# Patient Record
Sex: MALE | URBAN - METROPOLITAN AREA
[De-identification: names, ages, dates, MRNs, and addresses within clinical notes are randomized per-mention and may not be internally consistent; named-entity substitution may affect disease eponyms.]

---

## 2021-11-29 ENCOUNTER — INPATIENT (INPATIENT)
Facility: HOSPITAL | Age: 51
LOS: 10 days | Discharge: PSYCHIATRIC OR SUBSTANCE USE FACILITY | DRG: 896 | End: 2021-12-10
Attending: HOSPITALIST | Admitting: HOSPITALIST
Payer: MEDICAID

## 2021-11-29 VITALS
HEART RATE: 90 BPM | RESPIRATION RATE: 17 BRPM | DIASTOLIC BLOOD PRESSURE: 64 MMHG | OXYGEN SATURATION: 99 % | SYSTOLIC BLOOD PRESSURE: 124 MMHG

## 2021-11-29 DIAGNOSIS — F10.20 ALCOHOL DEPENDENCE, UNCOMPLICATED: ICD-10-CM

## 2021-11-29 LAB
ALBUMIN SERPL ELPH-MCNC: 3.6 G/DL — SIGNIFICANT CHANGE UP (ref 3.3–5)
ALP SERPL-CCNC: 76 U/L — SIGNIFICANT CHANGE UP (ref 40–120)
ALT FLD-CCNC: 16 U/L — SIGNIFICANT CHANGE UP (ref 10–45)
ANION GAP SERPL CALC-SCNC: 11 MMOL/L — SIGNIFICANT CHANGE UP (ref 5–17)
APAP SERPL-MCNC: <15 UG/ML — SIGNIFICANT CHANGE UP (ref 10–30)
AST SERPL-CCNC: 18 U/L — SIGNIFICANT CHANGE UP (ref 10–40)
BASOPHILS # BLD AUTO: 0.05 K/UL — SIGNIFICANT CHANGE UP (ref 0–0.2)
BASOPHILS NFR BLD AUTO: 0.5 % — SIGNIFICANT CHANGE UP (ref 0–2)
BILIRUB SERPL-MCNC: 0.2 MG/DL — SIGNIFICANT CHANGE UP (ref 0.2–1.2)
BUN SERPL-MCNC: 11 MG/DL — SIGNIFICANT CHANGE UP (ref 7–23)
CALCIUM SERPL-MCNC: 9.1 MG/DL — SIGNIFICANT CHANGE UP (ref 8.4–10.5)
CHLORIDE SERPL-SCNC: 107 MMOL/L — SIGNIFICANT CHANGE UP (ref 96–108)
CO2 SERPL-SCNC: 25 MMOL/L — SIGNIFICANT CHANGE UP (ref 22–31)
CREAT SERPL-MCNC: 0.65 MG/DL — SIGNIFICANT CHANGE UP (ref 0.5–1.3)
EOSINOPHIL # BLD AUTO: 0.15 K/UL — SIGNIFICANT CHANGE UP (ref 0–0.5)
EOSINOPHIL NFR BLD AUTO: 1.6 % — SIGNIFICANT CHANGE UP (ref 0–6)
ETHANOL SERPL-MCNC: SIGNIFICANT CHANGE UP MG/DL (ref 0–10)
GLUCOSE SERPL-MCNC: 74 MG/DL — SIGNIFICANT CHANGE UP (ref 70–99)
HCT VFR BLD CALC: 31.7 % — LOW (ref 39–50)
HGB BLD-MCNC: 9.1 G/DL — LOW (ref 13–17)
IMM GRANULOCYTES NFR BLD AUTO: 0.5 % — SIGNIFICANT CHANGE UP (ref 0–1.5)
LYMPHOCYTES # BLD AUTO: 1.96 K/UL — SIGNIFICANT CHANGE UP (ref 1–3.3)
LYMPHOCYTES # BLD AUTO: 20.6 % — SIGNIFICANT CHANGE UP (ref 13–44)
MAGNESIUM SERPL-MCNC: 2 MG/DL — SIGNIFICANT CHANGE UP (ref 1.6–2.6)
MCHC RBC-ENTMCNC: 20.6 PG — LOW (ref 27–34)
MCHC RBC-ENTMCNC: 28.7 GM/DL — LOW (ref 32–36)
MCV RBC AUTO: 71.7 FL — LOW (ref 80–100)
MONOCYTES # BLD AUTO: 1.23 K/UL — HIGH (ref 0–0.9)
MONOCYTES NFR BLD AUTO: 12.9 % — SIGNIFICANT CHANGE UP (ref 2–14)
NEUTROPHILS # BLD AUTO: 6.08 K/UL — SIGNIFICANT CHANGE UP (ref 1.8–7.4)
NEUTROPHILS NFR BLD AUTO: 63.9 % — SIGNIFICANT CHANGE UP (ref 43–77)
NRBC # BLD: 0 /100 WBCS — SIGNIFICANT CHANGE UP (ref 0–0)
PCP SPEC-MCNC: SIGNIFICANT CHANGE UP
PHOSPHATE SERPL-MCNC: 3.3 MG/DL — SIGNIFICANT CHANGE UP (ref 2.5–4.5)
PLATELET # BLD AUTO: 490 K/UL — HIGH (ref 150–400)
POTASSIUM SERPL-MCNC: 4.5 MMOL/L — SIGNIFICANT CHANGE UP (ref 3.5–5.3)
POTASSIUM SERPL-SCNC: 4.5 MMOL/L — SIGNIFICANT CHANGE UP (ref 3.5–5.3)
PROT SERPL-MCNC: 6.7 G/DL — SIGNIFICANT CHANGE UP (ref 6–8.3)
RBC # BLD: 4.42 M/UL — SIGNIFICANT CHANGE UP (ref 4.2–5.8)
RBC # FLD: 18.8 % — HIGH (ref 10.3–14.5)
SALICYLATES SERPL-MCNC: <2 MG/DL — LOW (ref 15–30)
SARS-COV-2 RNA SPEC QL NAA+PROBE: DETECTED
SODIUM SERPL-SCNC: 143 MMOL/L — SIGNIFICANT CHANGE UP (ref 135–145)
WBC # BLD: 9.52 K/UL — SIGNIFICANT CHANGE UP (ref 3.8–10.5)
WBC # FLD AUTO: 9.52 K/UL — SIGNIFICANT CHANGE UP (ref 3.8–10.5)

## 2021-11-29 PROCEDURE — 93010 ELECTROCARDIOGRAM REPORT: CPT

## 2021-11-29 PROCEDURE — 99285 EMERGENCY DEPT VISIT HI MDM: CPT

## 2021-11-29 RX ORDER — THIAMINE MONONITRATE (VIT B1) 100 MG
100 TABLET ORAL ONCE
Refills: 0 | Status: COMPLETED | OUTPATIENT
Start: 2021-11-29 | End: 2021-11-29

## 2021-11-29 RX ORDER — SODIUM CHLORIDE 9 MG/ML
1000 INJECTION INTRAMUSCULAR; INTRAVENOUS; SUBCUTANEOUS ONCE
Refills: 0 | Status: COMPLETED | OUTPATIENT
Start: 2021-11-29 | End: 2021-11-29

## 2021-11-29 RX ORDER — FOLIC ACID 0.8 MG
1 TABLET ORAL ONCE
Refills: 0 | Status: COMPLETED | OUTPATIENT
Start: 2021-11-29 | End: 2021-11-29

## 2021-11-29 RX ADMIN — Medication 2 MILLIGRAM(S): at 15:31

## 2021-11-29 RX ADMIN — Medication 100 MILLIGRAM(S): at 17:05

## 2021-11-29 RX ADMIN — Medication 1 MILLIGRAM(S): at 17:05

## 2021-11-29 NOTE — ED BEHAVIORAL HEALTH NOTE - BEHAVIORAL HEALTH NOTE
===================  PRE-HOSPITAL COURSE  ===================  SOURCE:  RN and secondhand EMR documentation.   DETAILS:  Patient was BIBEMS; chief complaint of bizarre behavior.     ============  ED COURSE   ============  SOURCE: RN and secondhand EMR documentation.    ARRIVAL:  Patient arrived disorganized and somewhat cooperative; presents in hospital gown, malodorous and with poor hygiene, abrasions noted on feet and head.   BELONGINGS:  None notable.   BEHAVIOR: Patient has been sleeping in hospital stretcher since arrival; did not want to get off of stretcher to go in telepsych room, ativan was given to calm patient. Patient did endorse SI when he came in, no plan noted, no endorses HI/AH/VH however patient is very disorganized, not oriented to year, unable to answer assessment questions. Patient's speech is garbled and illogical. Patient makes fair eye contact. Patient continues to sleep in hospital stretcher.   TREATMENT:  Patient received 2mg ativan IM ~15:20.   VISITORS:  Patient presently unaccompanied by social supports while in ED.     COVID Exposure Screen- collateral (i.e. third-party, chart review, belongings, etc; include EMS and ED staff)     *Has the patient had a COVID-19 test in the last 90 days?  ( X) Yes   (  ) No   (  ) Unknown- Reason: _____     IF YES PROCEED TO QUESTION #2. IF NO OR UNKNOWN, PLEASE SKIP TO QUESTION #3.     Date of test(s) and result(s): __POSITIVE as of 11/29/2021___     *Has the patient tested positive for COVID-19 antibodies? (  ) Yes   (  ) No   ( X) Unknown- Reason: _____     IF YES PROCEED TO QUESTION #4. IF NO or UNKNOWN, PLEASE SKIP TO QUESTION #5.     Date of positive antibody test: _______     *Has the patient received 2 doses of the COVID-19 vaccine? (  ) Yes   (  ) No   ( X) Unknown- Reason: _____     IF YES PROCEED TO QUESTION #6. IF NO or UNKNOWN, PLEASE SKIP TO QUESTION #7.      Date of second dose: ___ _____     *In the past 10 days, has the patient been around anyone with a positive COVID-19 test?* (  ) Yes   (  ) No   (X) Unknown- Reason: ____     IF YES PROCEED TO QUESTION #8. IF NO or UNKNOWN, PLEASE SKIP TO QUESTION #13.     Was the patient within 6 feet of them for at least 15 minutes? (  ) Yes   (  ) No   (  ) Unknown- Reason: _____     Did the patient provide care for them? (  ) Yes   (  ) No   (  ) Unknown- Reason: ______     Did the patient have direct physical contact with them (touched, hugged, or kissed them)? (  ) Yes   (  ) No    (  ) Unknown- Reason: __     Did the patient share eating or drinking utensils with them? (  ) Yes   (  ) No    (  ) Unknown- Reason: ____     Did they sneeze, cough, or somehow get respiratory droplets on the patient? (  ) Yes   (  ) No    (  ) Unknown- Reason: ______     *Has the patient been out of [place] within the past 10 days?* (  ) Yes   (  ) No   ( X) Unknown- Reason: _____     IF YES PLEASE ANSWER THE FOLLOWING QUESTIONS:     Which state/country did they go to? ______     Were they there over 24 hours? (  ) Yes   (  ) No    (  ) Unknown- Reason: ______     Date of return to [facility]: ______

## 2021-11-29 NOTE — ED PROVIDER NOTE - OBJECTIVE STATEMENT
52yo M with PMHx of alcohol abuse p/w AMS. Pt was just in [facility] last night; he was discharged at 7am and returned to the triage area afterwards saying he was tired. He left at 9:30am. Police were called because he was acting strange at a bus stop and then brought here. Pt unable (or unwilling) to answer many questions. He is able to grumble and say to leave him alone. When asked if he has any medical history he says schizophrenia, but when asked what he takes for it he responds with "magic."

## 2021-11-29 NOTE — ED BEHAVIORAL HEALTH ASSESSMENT NOTE - SUMMARY
Pt is a 52yo M, presented to [facility] earlier today and reported to have had an bottle of alcohol and presented with head laceration and after repair, asked for place to stay due to cold weather, and subsequently reportedly brought to [facility] after AMA, consult called today for reported schizophrenia. Pt presents as not oriented, with the etiology of this presentation unclear, delirium vs. intoxication vs malingering vs other factors. Patient is not able to be psychiatrically cleared at this time.

## 2021-11-29 NOTE — ED ADULT NURSE REASSESSMENT NOTE - NS ED NURSE REASSESS COMMENT FT1
Pt was with intermittent irritability at times, earlier he refused IV insertion by jerking hand and then attempting to kick writer, resident and provider were made aware at that time and saline bolus not given, he then received Ativan 2mg IM since CT scan of the head could not be performed 2/2 to non compliance; CT scan eventually completed, writer again tried IV insertion and pt again became agitated, no further action at this time, CO in place, VSS, awaiting tele psych consult.

## 2021-11-29 NOTE — ED PROVIDER NOTE - SKIN, MLM
Laceration with dried blood present over frontal forehead. No retained sutures noted. Dirt and other foreign material around ankles and feet.

## 2021-11-29 NOTE — ED PROVIDER NOTE - CARE PLAN
1 Principal Discharge DX:	AMS (altered mental status)  Secondary Diagnosis:	Alcohol intoxication, uncomplicated

## 2021-11-29 NOTE — ED PROVIDER NOTE - DATE/TIME 5
63 yo female pmh esrd due for dialysis tomorrow comes to ed trip and fell  hitting nose, and left knee; pt denies loc, nausea , vomiting or headache; pt takes plavix and aspirin;
30-Nov-2021 08:04

## 2021-11-29 NOTE — ED PROVIDER NOTE - PROGRESS NOTE DETAILS
etoh neg - pt remains confused but no lateralizing neuro findings- will ct head  to r/o bleed or mass pt signed out to me pending psych eval, pt w/ altered mental status, here w/ negative alcohol level, labs unremarkable. pt aaox1, moving all extremities, pt reports he takes multiple medications but is unable to state the names, Marcy PGY3: pending telepsych pt was noted covid+. He became agitated waiting in room 29, and stooled on the floor. Has now been moved to room 26, is pending telepsych callback, on 1:1 care signed out from Amelia to Dr. Clay Kyle pending psych eval; vital sign assessment, ?pt at risk for withdrawal, pt unable to quantify freq of alcohol usage, attempted to call contact numbers in the EMR w/ no response. Attending note (Salazar): patient endorsed to me at signout; no events overnight but reportedly tele psychiatry unable to assess and recommended day psych team evaluation. labs reviewed, noted to have hgb 9.1, no recent prior labs per review of emr and per overnight team, no episodes of bleeding / bloody stools or melena; had defecated with normal appearing stool on floor of room 26.  Is covid positive (but no apparent respiratory symptoms) and is on isolation precautions as per policy. Resident Devora:  preliminary eval concerning for AMS, unclear etiology at this time. Will initiate CIWA (pt with endorsement of EtOH intoxication 2 nights prior at [facility] ED - duplicate MRN). Discussion about LP to r/o Encephalitis - however pt will require to be sedated as he is non-compliant with even history/physical and agitated. Case endorsed to Hospitalist (MD Lucrecia).

## 2021-11-29 NOTE — ED ADULT NURSE NOTE - OBJECTIVE STATEMENT
51 year male BIB EMS after elopement form [facility] ED; EMS states that they picked up pt on the street after a passerby called 911 since he was banging on the windows of a bus, he then eloped from [facility] ED and they picked him up again on the street; pt is malodorous, disheveled, wearing hospital gowns, multiple abrasions to feet and one on forehead, EMS states that [facility] ED disposed of all of pt's property since it was "soiled"; pt is not an accurate historian and has garbled speech, thought the year was "nineteen something", states he "drinks a lot" he is endorsing SI with no plan or intent, when asked where he lives could not answer, mentioned something about "my mother" but then said he could no go there to live; continue to monitor.

## 2021-11-29 NOTE — ED BEHAVIORAL HEALTH ASSESSMENT NOTE - HPI (INCLUDE ILLNESS QUALITY, SEVERITY, DURATION, TIMING, CONTEXT, MODIFYING FACTORS, ASSOCIATED SIGNS AND SYMPTOMS)
Pt is a 50yo M, presented to [facility] earlier today and reported to have had an bottle of alcohol and presented with head laceration and after repair, asked for place to stay due to cold weather, and subsequently reportedly brought to [facility] after AMA, consult called today for reported schizophrenia.    On assessment, pt unable to meaningfully interact, reports name is 'Adam Franklin', or other name, reports he is at [place], reports he drank 'everything', unable to answer further questions. Pt unable to follow directions to sit up in bed, cover his genitals with gown or sheet.

## 2021-11-29 NOTE — ED ADULT NURSE NOTE - ED STAT RN HANDOFF DETAILS
report given to holding LUZ Schrader in PACU by phone; pt to be transported up, awaiting 3PM staff for CO on floor

## 2021-11-29 NOTE — ED ADULT NURSE REASSESSMENT NOTE - NS ED NURSE REASSESS COMMENT FT1
Pt defecated on floor. Pt placed back in stretcher, transported to [place] [id number] with tele cart for telepsych consult. one to one at bedside.

## 2021-11-29 NOTE — ED PROVIDER NOTE - CLINICAL SUMMARY MEDICAL DECISION MAKING FREE TEXT BOX
52yo M with unknown PMHx presenting with altered mental status. Likely due to alcohol use; however, patient would have had to drink a significant amount in the 2-3 hours since leaving the hospital (which is possible). Will obtain labwork and drug screen (pt states he does any drug, but does not clarify further). If alcohol level is low or negative or the patient's mental status is not improving; will consider CT head. 52yo M with unknown PMHx presenting with altered mental status. Likely due to alcohol use; however, patient would have had to drink a significant amount in the 2-3 hours since leaving the hospital (which is possible). Will obtain labwork and drug screen (pt states he does any drug, but does not clarify further). If alcohol level is low or negative or the patient's mental status is not improving; will consider CT head.  clau 51 m with schizoaffective  and chronic etoh , homless seen at [facility] 12 hrs ago dc to shelter and represented 2 hrs later was walked out of ed by security -  found by PD at bus station and ems called -- pt confused aox1 aob - perrl, small abrasion to forehead - noted from [facility] reports old and removal of staples -- no other deformities or signs of trauma - pt reports has mental illness and is prescribed "magic" by his dr -- connie intox after dc will monitor for sobriety -- basic labs- and if sensorium doesn't clear consider ct head

## 2021-11-29 NOTE — ED ADULT NURSE NOTE - NSIMPLEMENTINTERV_GEN_ALL_ED
Implemented All Fall Risk Interventions:  [place] to call system. Call bell, personal items and telephone within reach. Instruct patient to call for assistance. Room bathroom lighting operational. Non-slip footwear when patient is off stretcher. Physically safe environment: no spills, clutter or unnecessary equipment. Stretcher in lowest position, wheels locked, appropriate side rails in place. Provide visual cue, wrist band, yellow gown, etc. Monitor gait and stability. Monitor for mental status changes and reorient to person, place, and time. Review medications for side effects contributing to fall risk. Reinforce activity limits and safety measures with patient and family.

## 2021-11-30 DIAGNOSIS — F20.9 SCHIZOPHRENIA, UNSPECIFIED: ICD-10-CM

## 2021-11-30 DIAGNOSIS — Z29.9 ENCOUNTER FOR PROPHYLACTIC MEASURES, UNSPECIFIED: ICD-10-CM

## 2021-11-30 DIAGNOSIS — D64.9 ANEMIA, UNSPECIFIED: ICD-10-CM

## 2021-11-30 DIAGNOSIS — R41.82 ALTERED MENTAL STATUS, UNSPECIFIED: ICD-10-CM

## 2021-11-30 DIAGNOSIS — U07.1 COVID-19: ICD-10-CM

## 2021-11-30 DIAGNOSIS — F05 DELIRIUM DUE TO KNOWN PHYSIOLOGICAL CONDITION: ICD-10-CM

## 2021-11-30 LAB
ANION GAP SERPL CALC-SCNC: 9 MMOL/L — SIGNIFICANT CHANGE UP (ref 5–17)
BUN SERPL-MCNC: 13 MG/DL — SIGNIFICANT CHANGE UP (ref 7–23)
CALCIUM SERPL-MCNC: 9 MG/DL — SIGNIFICANT CHANGE UP (ref 8.4–10.5)
CHLORIDE SERPL-SCNC: 104 MMOL/L — SIGNIFICANT CHANGE UP (ref 96–108)
CO2 SERPL-SCNC: 23 MMOL/L — SIGNIFICANT CHANGE UP (ref 22–31)
CREAT SERPL-MCNC: 0.57 MG/DL — SIGNIFICANT CHANGE UP (ref 0.5–1.3)
GLUCOSE SERPL-MCNC: 87 MG/DL — SIGNIFICANT CHANGE UP (ref 70–99)
HCT VFR BLD CALC: 33.3 % — LOW (ref 39–50)
HGB BLD-MCNC: 9.8 G/DL — LOW (ref 13–17)
MCHC RBC-ENTMCNC: 20.5 PG — LOW (ref 27–34)
MCHC RBC-ENTMCNC: 29.4 GM/DL — LOW (ref 32–36)
MCV RBC AUTO: 69.8 FL — LOW (ref 80–100)
NRBC # BLD: 0 /100 WBCS — SIGNIFICANT CHANGE UP (ref 0–0)
PLATELET # BLD AUTO: 478 K/UL — HIGH (ref 150–400)
POTASSIUM SERPL-MCNC: 4.7 MMOL/L — SIGNIFICANT CHANGE UP (ref 3.5–5.3)
POTASSIUM SERPL-SCNC: 4.7 MMOL/L — SIGNIFICANT CHANGE UP (ref 3.5–5.3)
RBC # BLD: 4.77 M/UL — SIGNIFICANT CHANGE UP (ref 4.2–5.8)
RBC # FLD: 18.5 % — HIGH (ref 10.3–14.5)
SODIUM SERPL-SCNC: 136 MMOL/L — SIGNIFICANT CHANGE UP (ref 135–145)
WBC # BLD: 8.29 K/UL — SIGNIFICANT CHANGE UP (ref 3.8–10.5)
WBC # FLD AUTO: 8.29 K/UL — SIGNIFICANT CHANGE UP (ref 3.8–10.5)

## 2021-11-30 PROCEDURE — 71045 X-RAY EXAM CHEST 1 VIEW: CPT | Mod: 26

## 2021-11-30 PROCEDURE — 99223 1ST HOSP IP/OBS HIGH 75: CPT | Mod: GC

## 2021-11-30 PROCEDURE — 99233 SBSQ HOSP IP/OBS HIGH 50: CPT

## 2021-11-30 RX ORDER — THIAMINE MONONITRATE (VIT B1) 100 MG
500 TABLET ORAL THREE TIMES A DAY
Refills: 0 | Status: DISCONTINUED | OUTPATIENT
Start: 2021-11-30 | End: 2021-12-03

## 2021-11-30 RX ORDER — ONDANSETRON 8 MG/1
4 TABLET, FILM COATED ORAL EVERY 8 HOURS
Refills: 0 | Status: DISCONTINUED | OUTPATIENT
Start: 2021-11-30 | End: 2021-12-05

## 2021-11-30 RX ORDER — FOLIC ACID 0.8 MG
1 TABLET ORAL DAILY
Refills: 0 | Status: DISCONTINUED | OUTPATIENT
Start: 2021-12-01 | End: 2021-12-05

## 2021-11-30 RX ORDER — ENOXAPARIN SODIUM 100 MG/ML
40 INJECTION SUBCUTANEOUS DAILY
Refills: 0 | Status: DISCONTINUED | OUTPATIENT
Start: 2021-11-30 | End: 2021-12-10

## 2021-11-30 RX ORDER — LANOLIN ALCOHOL/MO/W.PET/CERES
3 CREAM (GRAM) TOPICAL AT BEDTIME
Refills: 0 | Status: DISCONTINUED | OUTPATIENT
Start: 2021-11-30 | End: 2021-12-10

## 2021-11-30 RX ORDER — THIAMINE MONONITRATE (VIT B1) 100 MG
500 TABLET ORAL THREE TIMES A DAY
Refills: 0 | Status: DISCONTINUED | OUTPATIENT
Start: 2021-11-30 | End: 2021-11-30

## 2021-11-30 RX ADMIN — Medication 2 MILLIGRAM(S): at 17:00

## 2021-11-30 RX ADMIN — Medication 3 MILLIGRAM(S): at 21:03

## 2021-11-30 RX ADMIN — Medication 105 MILLIGRAM(S): at 21:03

## 2021-11-30 NOTE — PROGRESS NOTE BEHAVIORAL HEALTH - NSBHCHARTREVIEWVS_PSY_A_CORE FT
Vital Signs Last 24 Hrs  T(C): 37 (30 Nov 2021 10:00), Max: 37 (29 Nov 2021 16:00)  T(F): 98.6 (30 Nov 2021 10:00), Max: 98.6 (29 Nov 2021 16:00)  HR: 92 (30 Nov 2021 10:00) (88 - 100)  BP: 133/71 (30 Nov 2021 10:00) (124/64 - 154/92)  BP(mean): --  RR: 18 (30 Nov 2021 10:00) (16 - 18)  SpO2: 98% (30 Nov 2021 10:00) (96% - 99%)

## 2021-11-30 NOTE — PROGRESS NOTE BEHAVIORAL HEALTH - NSBHCHARTREVIEWIMAGING_PSY_A_CORE FT
< from: CT Head No Cont (11.29.21 @ 16:41) >      EXAM:  CT BRAIN                            *** ADDENDUM 11/29/2021  ***    CT OF THE HEAD WITHOUT CONTRAST    CLINICAL INDICATION: Change in mental status.    TECHNIQUE: Volumetric CT acquisition was performed through the brain and reviewed using brain and bone window technique. Dose optimization techniques were utilized including kVp/mA modulation along with iterative reconstructions.      COMPARISON: No prior studies have been submitted for comparison.    FINDINGS:    The ventricular and sulcal size and configuration is age appropriate.   There is no acute loss of gray-white differentiation.    There is no evidence of mass effect, midline shift, acute intracranial hemorrhage, or extra-axial collections.     The calvarium is intact. Severemucosal thickening in the right maxillary sinus. Moderate mucosal thickening and secretions within the left maxillary sinus. Moderate mucosal thickening in the ethmoid air cells and right frontal sinus.The mastoid air cells are predominantly clear. The orbits are unremarkable.      IMPRESSION:  No acute intracranial hemorrhage or acute territorial infarction.  Sinus inflammatory changes as described.    < end of copied text >

## 2021-11-30 NOTE — H&P ADULT - PROBLEM SELECTOR PLAN 1
BAL negative. Per patient, history of alcohol use disorder. (+) Benzo.  - Placed on CIWA protocol with symptom triggered Arivan  - Social work consult  -  following: apprec recs BAL negative. Per patient, history of alcohol use disorder. (+) Benzo.  - Placed on CIWA protocol with symptom triggered Ativan  - Social work consult  -  following: apprec recs BAL negative. Per patient, history of alcohol use disorder. (+) Benzo.  - Placed on CIWA protocol with symptom triggered Ativan  - started thiamine 500 mg IV TID x 3 days then go to thiamine 100 mg daily  - f/u TSH  - started folate 1 mg daily  - Social work consult  -  following: apprec recs

## 2021-11-30 NOTE — PROGRESS NOTE BEHAVIORAL HEALTH - CASE SUMMARY
51M undomiciled, with unknown PPH/admissions/SA, ?substance abuse significant for ETOH per chart, unknown PMH seen recently at [facility] for head lac and AMS, eloped from [facility] and was banging on a bus window, was BIB EMS to [facility], telepsychiatry saw pt for AMS and reported h/o ?schizophrenia, found COVID +, pending further reassessment by psych day team. On interview, pt AOx1, does not know his age, limited historian, does not recall why he came to ER or how.  Gives erratic and inappropriate answers to questions asked, disorganized, eating items on his food tray voraciously (ie: eggs with shells on).  Denies SI/HI, cannot meaningfully answer questions regarding AVH or history of psychiatric illness/treatments.  Inattentive to interview questions, intermittently lethargic.  Per ED staff was restless, defecated on ED floor.  Dx: Delirium 2/2 GMC.  Recs: Admit to medicine for w/u of AMS, CIWA monitoring with symptom-triggered Ativan, Haldol 2.5mg-5mg PO/IV q6h PRN agitation if QTc<500, SW input for safe d/c planning, C-L psych will f/u.

## 2021-11-30 NOTE — PROGRESS NOTE BEHAVIORAL HEALTH - RISK ASSESSMENT
Risk factors: acute/chronic medical issues, acute/chronic cognitive impairments, active substance abuse, not receiving treatment, homelessness, recent agitation/aggression, disorganization    Protective factors: no current SIIP/HIIP, no h/o SA/SIB

## 2021-11-30 NOTE — H&P ADULT - ATTENDING COMMENTS
52 yo male (likely undomiciled) with PMHx concerning for alcohol abuse and schizophrenia though unable to obtain clear history due to patients encephalopathy, seen by psychiatry and placed on CIWA protocol for possible withdrawal however also may be due to underlying schizophrenia, incidentally found to be covid positive.  1. encephalopathy: appreciate psych consult.  Cannot r/o etoh withdrawal, though utox only showing benzos and etoh level negative.  Has elevated ciwa in discussion with nurse however unclear if this is accurate with underlying schizophrenia (high ciwa due to hearing voices/seeing things but not tremulous/diaphoretic on exam).  Will treat with ciwa for now and I left message for psych Dr Espino.  Also check tsh, b12, folate, rpr, ua, hiv.  Treat with high dose thiamine, mvi, folate.  Haldol prn agitation and check baseline ekg for qtc.  Consider eeg/mri.  2. etoh withdrawal: as above with ativan prn, mvi/folate/thiamine, social work c/s  3. schizophrenia: social work, psych consults  4. COVID: not hypoxic, no cough/sob noted on exam.  Not candidate for treatment at this time.  Would check inflammatory markers in morning, screening cxr.  Check antibodies for evidence of prior vaccination.  If Oxygen falls <94% may be candidate for treatment.  5. ppx: lovenox due to +covid    Beeper: [phone]

## 2021-11-30 NOTE — PROGRESS NOTE BEHAVIORAL HEALTH - NSBHCHARTREVIEWLAB_PSY_A_CORE FT
9.8    8.29  )-----------( 478      ( 30 Nov 2021 08:18 )             33.3     11-30    136  |  104  |  13  ----------------------------<  87  4.7   |  23  |  0.57    Ca    9.0      30 Nov 2021 08:18  Phos  3.3     11-29  Mg     2.0     11-29    TPro  6.7  /  Alb  3.6  /  TBili  0.2  /  DBili  x   /  AST  18  /  ALT  16  /  AlkPhos  76  11-29

## 2021-11-30 NOTE — ED ADULT NURSE REASSESSMENT NOTE - NS ED NURSE REASSESS COMMENT FT1
Pt remains oriented to self only, garbled speech, ate breakfast, allowed BW, changed CO in place, VSS; tele psych currently evaluating; continue to monitor.

## 2021-11-30 NOTE — PROGRESS NOTE BEHAVIORAL HEALTH - NSBHFUPINTERVALHXFT_PSY_A_CORE
Pt is a 52yo M, presented to [facility] earlier yesterday afternoon and reported to have had an bottle of alcohol and presented with head laceration and after repair, asked for place to stay due to cold weather, and subsequently reportedly brought to [facility] after AMA, consult psychiatry called for agitation, disorganization.  Seen initially by telepsychiatry ON, found to be positive for COVID but asymptomatic.  Seen this morning for reevaluation, patient is awake, oriented states she is in the hospital but can not give the name of the hospital or location.  He is unable to state the current year states that the current year is 1972.  He is unable to recall how he ended up in the hospital or why he is here.  He speaks answers questions nonsensically when asked about how he is feeling answers "Cheerios".  Patient at times mumbling incoherently.  When asked about substance use, states he uses but does not states which ones when asked, only repeats what writer says.  He reports uses substances/alcohol yesterday but unable to quantify the amount.  He is unable to states whether or not he has family, friends, states he is homeless.  When asked about visible laceration on his forehead is unable to elaborate on how he got it, only answers "I touched it."  When asked if he has schizophrenia or mental illness states that he has this illness and reports "you told me I have it", patient unable to elaborate on PPHx or PMHx.  He denies SI/HI, denies AH, but does endorse having VH, but when asked to elaborate on this is unable to.  Patient eating breakfast tray, putting large amount of food in his mouth, grabbing hard boiled egg off tray and eating them with shell on.  Patient not acutely agitated at this point, was cooperative with nurse with VS, taking temperature.    Per nursing staff, patient agitated aggressive yesterday, now remains disorganized, confused, defecated on the floor overnight, per nursing patient is not tremulous, per ED attending was talking about responding to "magic". Pt is a 50yo M, presented to [facility] earlier yesterday afternoon and reported to have had an bottle of alcohol and presented with head laceration and after repair, asked for place to stay due to cold weather, and subsequently reportedly brought to [facility] after AMA, consult psychiatry called for agitation, disorganization.  Seen initially by telepsychiatry ON, found to be positive for COVID but asymptomatic.  Seen this morning for reevaluation, patient is awake, oriented states she is in the hospital but can not give the name of the hospital or location.  He is unable to state the current year states that the current year is 1972.  Reports he is now 28 years old, he is unable to recall how he ended up in the hospital or why he is here.  He speaks answers questions nonsensically when asked about how he is feeling answers "Cheerios".  Patient at times mumbling incoherently.  When asked about substance use, states he uses but does not states which ones when asked, only repeats what writer says.  He reports uses substances/alcohol yesterday but unable to quantify the amount.  He is unable to states whether or not he has family, friends, states he is homeless.  When asked about visible laceration on his forehead is unable to elaborate on how he got it, only answers "I touched it."  When asked if he has schizophrenia or mental illness states that he has this illness and reports "you told me I have it", patient unable to elaborate on PPHx or PMHx.  He denies SI/HI, denies AH, but does endorse having VH, but when asked to elaborate on this is unable to.  Patient eating breakfast tray, putting large amount of food in his mouth, grabbing hard boiled egg off tray and eating them with shell on.  Patient not acutely agitated at this point, was cooperative with nurse with VS, taking temperature.    Per nursing staff, patient agitated aggressive yesterday, now remains disorganized, confused, defecated on the floor overnight, per nursing patient is not tremulous, per ED attending was talking about responding to "magic". Pt is a 52yo M, presented to [facility] earlier yesterday afternoon and reported to have had an bottle of alcohol and presented with head laceration and after repair, asked for place to stay due to cold weather, and subsequently reportedly brought to [facility] after AMA, consult psychiatry called for agitation, disorganization.  Seen initially by telepsychiatry ON, found to be positive for COVID but asymptomatic.  Seen this morning for reevaluation, patient is awake, states she is in the hospital but can not give the name of the hospital or location.  He is unable to state the current year states that the current year is 1972.  Reports he is now 28 years old, he is unable to recall how he ended up in the hospital or why he is here.  He speaks answers questions nonsensically when asked about how he is feeling answers "Cheerios".  Patient at times mumbling incoherently.  When asked about substance use, states he uses but does not states which ones when asked, only repeats what writer says.  He reports uses substances/alcohol yesterday but unable to quantify the amount.  He is unable to states whether or not he has family, friends, states he is homeless.  When asked about visible laceration on his forehead is unable to elaborate on how he got it, only answers "I touched it."  When asked if he has schizophrenia or mental illness states that he has this illness and reports "you told me I have it", patient unable to elaborate on PPHx or PMHx.  He denies SI/HI, denies AH, but does endorse having VH, but when asked to elaborate on this is unable to.  Patient eating breakfast tray, putting large amount of food in his mouth, grabbing hard boiled egg off tray and eating them with shell on.  Patient not acutely agitated at this point, was cooperative with nurse with VS, taking temperature.    Per nursing staff, patient agitated aggressive yesterday, now remains disorganized, confused, defecated on the floor overnight, per nursing patient is not tremulous, per ED attending was talking about responding to "magic".

## 2021-11-30 NOTE — H&P ADULT - NSHPREVIEWOFSYSTEMS_GEN_ALL_CORE
REVIEW OF SYSTEMS:  CONSTITUTIONAL: No fever, weight loss, or fatigue  EYES: No eye pain, visual disturbances, or discharge  ENT:  No difficulty hearing, tinnitus, vertigo; No sinus or throat pain  NECK: No pain or stiffness  BREASTS: No pain, masses, or nipple discharge  RESPIRATORY: No cough, wheezing, chills or hemoptysis; No shortness of breath  CARDIOVASCULAR: No chest pain, palpitations, dizziness, or leg swelling  GASTROINTESTINAL: No abdominal or epigastric pain. No nausea, vomiting, or hematemesis; No diarrhea or constipation. No melena or hematochezia.  GENITOURINARY: No dysuria, frequency, hematuria, or incontinence  NEUROLOGICAL: No headaches, memory loss, loss of strength, numbness, or tremors  SKIN: No itching, burning, rashes, or lesions   LYMPH NODES: No enlarged glands  ENDOCRINE: No heat or cold intolerance; No hair loss  MUSCULOSKELETAL: No joint pain or swelling; No muscle, back, or extremity pain  PSYCHIATRIC: No depression, anxiety, mood swings, or difficulty sleeping  HEME/LYMPH: No easy bruising, or bleeding gums  ALLERGY AND IMMUNOLOGIC: No hives or eczema REVIEW OF SYSTEMS:  unable to obtain a ROS as patient would not answer questions, answering inappropriately

## 2021-11-30 NOTE — H&P ADULT - NSHPLABSRESULTS_GEN_ALL_CORE
LABS:      The Labs were reviewed by me   The Radiology was reviewed by me    EKG tracing reviewed by me    11-30    136  |  104  |  13  ----------------------------<  87  4.7   |  23  |  0.57  11-29    143  |  107  |  11  ----------------------------<  74  4.5   |  25  |  0.65    Ca    9.0      30 Nov 2021 08:18  Ca    9.1      29 Nov 2021 13:28  Phos  3.3     11-29  Mg     2.0     11-29    TPro  6.7  /  Alb  3.6  /  TBili  0.2  /  DBili  x   /  AST  18  /  ALT  16  /  AlkPhos  76  11-29    Magnesium, Serum: 2.0 mg/dL (11-29-21 @ 13:28)    Phosphorus Level, Serum: 3.3 mg/dL (11-29-21 @ 13:28)                                              9.8    8.29  )-----------( 478      ( 30 Nov 2021 08:18 )             33.3                         9.1    9.52  )-----------( 490      ( 29 Nov 2021 13:28 )             31.7     CAPILLARY BLOOD GLUCOSE      POCT Blood Glucose.: 95 mg/dL (30 Nov 2021 05:53)            RADIOLOGY & ADDITIONAL TESTS:    Imaging Personally Reviewed:  [ ] YES  [ ] NO

## 2021-11-30 NOTE — PROGRESS NOTE BEHAVIORAL HEALTH - NSBHCONSULTRECOMMENDOTHER_PSY_A_CORE FT
1. Check: TSH, B12, folate, RPR, UA, consider MRI head, EEG, HIV, neuro evaluation  2.  referral as patient is homeless

## 2021-11-30 NOTE — H&P ADULT - PROBLEM SELECTOR PLAN 3
COVID-19+ on admission. Not endorsing SOB. SpO2 97% on RA. Unclear when sxs began, as patient poor historian.  - Monitor SpO2 and provide O2 support PRN  - CTH showed severe R maxillary sinus thickening and mucosal thickening ethmoid air cells and R frontal sinus Hgb 9s on admission  - f/u iron studies Hgb 9s on admission  - f/u iron studies, B12, folate

## 2021-11-30 NOTE — H&P ADULT - PROBLEM SELECTOR PLAN 4
Diet: regular  Dispo: pending BH eval  DVT ppx: none, ambulating COVID-19+ on admission. Not endorsing SOB. SpO2 97% on RA. Unclear when sxs began, as patient poor historian.  - Monitor SpO2 and provide O2 support PRN  - CTH showed severe R maxillary sinus thickening and mucosal thickening ethmoid air cells and R frontal sinus

## 2021-11-30 NOTE — PROGRESS NOTE BEHAVIORAL HEALTH - SUMMARY
Pt is a 50yo M, presented to [facility] earlier today and reported to have had an bottle of alcohol and presented with head laceration and after repair, asked for place to stay due to cold weather, and subsequently reportedly brought to [facility] after AMA, consult called today for reported schizophrenia. Pt presents as not oriented, with the etiology of this presentation unclear, delirium vs. intoxication vs malingering vs other factors. Seen initially by telepsychiatry overnight, seen this morning for reevaluation, is oriented x 1, can not recall why he is here or how he got here.  Can not recall the current year or how old he is, states he is 28 years old.  Patient denies SI/HI.  He reports illicit substance and alcohol use, but can not elaborate on this or quantify the amount he has been abusing.  He presents as disorganized, confused and delirious.  Would recommend medical admission, AMS workup, neuro w/u, SW referral as patient is homeless.  Currently not an acute risk for harm to self as patient denies SI/HI.  Would recommend monitoring CIWA for alcohol/benzo withdrawal, high dose thiamine, MVI, folic acid supplementation. Pt is a 50yo M, presented to [facility] earlier today and reported to have had an bottle of alcohol and presented with head laceration and after repair, asked for place to stay due to cold weather, and subsequently reportedly brought to [facility] after AMA, consult called today for reported schizophrenia. Pt presents as not oriented, with the etiology of this presentation unclear, delirium vs. intoxication vs malingering vs other factors. Seen initially by telepsychiatry overnight, seen this morning for reevaluation, is oriented x 1, can not recall why he is here or how he got here.  Can not recall the current year or how old he is, states he is 28 years old.  Patient denies SI/HI.  He reports illicit substance and alcohol use, but can not elaborate on this or quantify the amount he has been abusing.  He presents as disorganized, confused and delirious.  Would recommend medical admission, AMS workup, neuro w/u, SW referral as patient is homeless.  Currently not an acute risk for harm to self as patient denies SI/HI.  Would recommend monitoring CIWA for alcohol/benzo withdrawal, high dose thiamine, MVI, folic acid supplementation.  CL psych will f/u.

## 2021-11-30 NOTE — PROGRESS NOTE BEHAVIORAL HEALTH - NSBHCONSULTSUBSTANCEALCOHOL_PSY_A_CORE FT
1. CIWA, thiamine/MVI/folic acid.  Thiamine 500mg IV tid x9 doses  2. PRN: Ativan 2mg PO q2h PRN sx-triggered CIWA  3.  for substance abuse referral resources.  OP psych f/u at [facility]: [phone]

## 2021-11-30 NOTE — H&P ADULT - PROBLEM SELECTOR PLAN 2
Per patient, history of schizophrenia not on any medications.   - Haldol 2.5 - 5 mg IV Q6h PRN for agitation if QTc < 500 Per patient, history of schizophrenia not on any medications.   - Haldol 2.5 - 5 mg IV Q6h PRN for agitation if QTc < 500  - SBIRT consult: apprec recs  -  following: apprec recs

## 2021-11-30 NOTE — H&P ADULT - NSHPPHYSICALEXAM_GEN_ALL_CORE
T(C): 37.1 (11-30-21 @ 13:34), Max: 37.1 (11-30-21 @ 13:34)  HR: 90 (11-30-21 @ 13:34) (88 - 100)  BP: 150/79 (11-30-21 @ 13:34) (125/71 - 154/92)  RR: 18 (11-30-21 @ 13:34) (16 - 18)  SpO2: 97% (11-30-21 @ 13:34) (96% - 99%)  Wt(kg): --Vital Signs Last 24 Hrs  T(C): 37.1 (30 Nov 2021 13:34), Max: 37.1 (30 Nov 2021 13:34)  T(F): 98.7 (30 Nov 2021 13:34), Max: 98.7 (30 Nov 2021 13:34)  HR: 90 (30 Nov 2021 13:34) (88 - 100)  BP: 150/79 (30 Nov 2021 13:34) (125/71 - 154/92)  BP(mean): --  RR: 18 (30 Nov 2021 13:34) (16 - 18)  SpO2: 97% (30 Nov 2021 13:34) (96% - 99%)    PHYSICAL EXAM:  GENERAL: NAD, well-groomed, well-developed  HEAD:  Atraumatic, Normocephalic  EYES: EOMI, PERRLA, conjunctiva and sclera clear  ENMT: No tonsillar erythema, exudates, or enlargement; Moist mucous membranes, Good dentition, No lesions  NECK: Supple, No JVD, Normal thyroid  NERVOUS SYSTEM:  Alert & Oriented X3, Good concentration; Motor Strength 5/5 B/L upper and lower extremities; DTRs 2+ intact and symmetric  CHEST/LUNG: Clear to percussion bilaterally; No rales, rhonchi, wheezing, or rubs  HEART: Regular rate and rhythm; No murmurs, rubs, or gallops  ABDOMEN: Soft, Nontender, Nondistended; Bowel sounds present  EXTREMITIES:  2+ Peripheral Pulses, No clubbing, cyanosis, or edema  LYMPH: No lymphadenopathy noted  SKIN: No rashes or lesions    Consultant(s) Notes Reviewed:  [x ] YES  [ ] NO  Care Discussed with Consultants/Other Providers [ x] YES  [ ] NO T(C): 37.1 (11-30-21 @ 13:34), Max: 37.1 (11-30-21 @ 13:34)  HR: 90 (11-30-21 @ 13:34) (88 - 100)  BP: 150/79 (11-30-21 @ 13:34) (125/71 - 154/92)  RR: 18 (11-30-21 @ 13:34) (16 - 18)  SpO2: 97% (11-30-21 @ 13:34) (96% - 99%)  Wt(kg): --Vital Signs Last 24 Hrs  T(C): 37.1 (30 Nov 2021 13:34), Max: 37.1 (30 Nov 2021 13:34)  T(F): 98.7 (30 Nov 2021 13:34), Max: 98.7 (30 Nov 2021 13:34)  HR: 90 (30 Nov 2021 13:34) (88 - 100)  BP: 150/79 (30 Nov 2021 13:34) (125/71 - 154/92)  BP(mean): --  RR: 18 (30 Nov 2021 13:34) (16 - 18)  SpO2: 97% (30 Nov 2021 13:34) (96% - 99%)    PHYSICAL EXAM:  GENERAL: Disheveled, poor dentition, aggressive positioning  HEAD:  Atraumatic, Normocephalic  EYES: EOMI, PERRLA, conjunctiva and sclera clear  ENMT: No tonsillar erythema, exudates, or enlargement; Moist mucous membranes, Good dentition, No lesions  NECK: Supple, No JVD, Normal thyroid  NERVOUS SYSTEM:  Alert & Oriented X3, Good concentration; Motor Strength 5/5 B/L upper and lower extremities; DTRs 2+ intact and symmetric  CHEST/LUNG: Clear to percussion bilaterally; No rales, rhonchi, wheezing, or rubs  HEART: Regular rate and rhythm; No murmurs, rubs, or gallops  ABDOMEN: Soft, Nontender, Nondistended; Bowel sounds present  EXTREMITIES:  2+ Peripheral Pulses, No clubbing, cyanosis, or edema  LYMPH: No lymphadenopathy noted  SKIN: No rashes or lesions    Consultant(s) Notes Reviewed:  [x ] YES  [ ] NO  Care Discussed with Consultants/Other Providers [ x] YES  [ ] NO

## 2021-11-30 NOTE — H&P ADULT - HISTORY OF PRESENT ILLNESS
***************************************************************  Lisa Iraj, PGY2  Internal Medicine   pager: NS: [phone] [facility]: [phone]  ***************************************************************    FRANCK SILVERIO  51y  Male      Patient is a 51y old  Male who presents with a chief complaint of     HPI:    ED Course:    ***************************************************************  Lisa Galo, PGY2  Internal Medicine   pager: NS: [phone] [facility]: [phone]  ***************************************************************    FRANCK SILVERIO  51y  Male      Patient is a 51y old  Male who presents with a chief complaint of     HPI:  Patient is a 51 year old male (likely undomiciled) with PMHx alcohol abuse, schizophrenia? p/w AMS. He was at [facility] last night saying he was tired and discharged at 7am. Police were called because patient was acting strangely at a bus stop and he was brought to the ED. He answered questions inappropriately and said "I'm scared of you." He flinched his body, acting as if he was going to attack, and then fell back down. He refused to ask questions about his medical history and denied any medications and allergies, and said, "Just leave me alone." He appeared disheveled with missing teeth and a red arabella on his forehead. He denies an trauma, but refused to answer questions about his ROS.     ED Course:   VS: afebrile, HR 96, /92, RR 18, SpO2 92% on RA  Labs: (+) benzo, COVID+, Hgb 9.1, BAL negative  Imaging: CTH severe R maxillary sinus thickening and mucosal thickening ethmoid air cells and R frontal sinus  s/p 1L NS, folate 1g, thiamine 100 mg x1  ***************************************************************  Lisa Galo, PGY2  Internal Medicine   pager: NS: [phone] [facility]: [phone]  ***************************************************************    JEAN CLAUDEFRANCK  51y  Male      Patient is a 51y old  Male who presents with a chief complaint of     HPI:  Patient is a 51 year old male (likely undomiciled) with PMHx alcohol abuse, schizophrenia? p/w AMS. He was at [facility] last night saying he was tired and discharged at 7am. Police were called because patient was acting strangely at a bus stop and he was brought to the ED. He answered questions inappropriately and said "I'm scared of you." He flinched his body, acting as if he was going to attack, and then fell back down. He refused to ask questions about his medical history and denied any medications and allergies, and said, "Just leave me alone." He appeared disheveled with missing teeth and a red arabella on his forehead. He denies an trauma, but refused to answer questions about his ROS. Per patient, last drink was yesterday.     ED Course:   VS: afebrile, HR 96, /92, RR 18, SpO2 92% on RA  Labs: (+) benzo, COVID+, Hgb 9.1, BAL negative  Imaging: CTH severe R maxillary sinus thickening and mucosal thickening ethmoid air cells and R frontal sinus  s/p 1L NS, folate 1g, thiamine 100 mg x1  ***************************************************************  Lisa Galo, PGY2  Internal Medicine   pager: NS: [phone] [facility]: [phone]  ***************************************************************    FRANCK SILVERIO  51y  Male      Patient is a 51y old  Male who presents with a chief complaint of confusion    HPI:  Patient is a 51 year old male (likely undomiciled) with PMHx alcohol abuse, schizophrenia? p/w AMS. He was at [facility] last night saying he was tired and discharged at 7am. Police were called because patient was acting strangely at a bus stop and he was brought to the ED. He answered questions inappropriately and said "I'm scared of you." He flinched his body, acting as if he was going to attack, and then fell back down. He refused to ask questions about his medical history and denied any medications and allergies, and said, "Just leave me alone." He appeared disheveled with missing teeth and a red arabella on his forehead. He denies an trauma, but refused to answer questions about his ROS. Per patient, last drink was yesterday.     ED Course:   VS: afebrile, HR 96, /92, RR 18, SpO2 92% on RA  Labs: (+) benzo, COVID+, Hgb 9.1, BAL negative  Imaging: CTH severe R maxillary sinus thickening and mucosal thickening ethmoid air cells and R frontal sinus  s/p 1L NS, folate 1g, thiamine 100 mg x1

## 2021-11-30 NOTE — H&P ADULT - ASSESSMENT
Patient is a 51 year old male (likely undomiciled) with PMHx alcohol abuse, schizophrenia? p/w AMS likely 2/2 alcohol use vs schizophrenia.

## 2021-12-01 DIAGNOSIS — U07.1 COVID-19: ICD-10-CM

## 2021-12-01 LAB
ALBUMIN SERPL ELPH-MCNC: 3.7 G/DL — SIGNIFICANT CHANGE UP (ref 3.3–5)
ALP SERPL-CCNC: 78 U/L — SIGNIFICANT CHANGE UP (ref 40–120)
ALT FLD-CCNC: 13 U/L — SIGNIFICANT CHANGE UP (ref 10–45)
AMMONIA BLD-MCNC: 33 UMOL/L — SIGNIFICANT CHANGE UP (ref 11–55)
ANION GAP SERPL CALC-SCNC: 12 MMOL/L — SIGNIFICANT CHANGE UP (ref 5–17)
APPEARANCE UR: CLEAR — SIGNIFICANT CHANGE UP
APTT BLD: 32.1 SEC — SIGNIFICANT CHANGE UP (ref 27.5–35.5)
AST SERPL-CCNC: 13 U/L — SIGNIFICANT CHANGE UP (ref 10–40)
BACTERIA # UR AUTO: NEGATIVE — SIGNIFICANT CHANGE UP
BASOPHILS # BLD AUTO: 0.05 K/UL — SIGNIFICANT CHANGE UP (ref 0–0.2)
BASOPHILS NFR BLD AUTO: 0.5 % — SIGNIFICANT CHANGE UP (ref 0–2)
BILIRUB SERPL-MCNC: 0.2 MG/DL — SIGNIFICANT CHANGE UP (ref 0.2–1.2)
BILIRUB UR-MCNC: NEGATIVE — SIGNIFICANT CHANGE UP
BUN SERPL-MCNC: 11 MG/DL — SIGNIFICANT CHANGE UP (ref 7–23)
CALCIUM SERPL-MCNC: 9.2 MG/DL — SIGNIFICANT CHANGE UP (ref 8.4–10.5)
CHLORIDE SERPL-SCNC: 103 MMOL/L — SIGNIFICANT CHANGE UP (ref 96–108)
CK SERPL-CCNC: 69 U/L — SIGNIFICANT CHANGE UP (ref 30–200)
CO2 SERPL-SCNC: 23 MMOL/L — SIGNIFICANT CHANGE UP (ref 22–31)
COLOR SPEC: SIGNIFICANT CHANGE UP
COVID-19 NUCLEOCAPSID GAM AB INTERP: POSITIVE
COVID-19 NUCLEOCAPSID TOTAL GAM ANTIBODY RESULT: 209 INDEX — HIGH
COVID-19 SPIKE DOMAIN AB INTERP: POSITIVE
COVID-19 SPIKE DOMAIN ANTIBODY RESULT: >250 U/ML — HIGH
CREAT SERPL-MCNC: 0.62 MG/DL — SIGNIFICANT CHANGE UP (ref 0.5–1.3)
CRP SERPL-MCNC: <3 MG/L — SIGNIFICANT CHANGE UP (ref 0–4)
D DIMER BLD IA.RAPID-MCNC: 218 NG/ML DDU — SIGNIFICANT CHANGE UP
DIFF PNL FLD: NEGATIVE — SIGNIFICANT CHANGE UP
EOSINOPHIL # BLD AUTO: 0.14 K/UL — SIGNIFICANT CHANGE UP (ref 0–0.5)
EOSINOPHIL NFR BLD AUTO: 1.4 % — SIGNIFICANT CHANGE UP (ref 0–6)
EPI CELLS # UR: 0 /HPF — SIGNIFICANT CHANGE UP
FERRITIN SERPL-MCNC: 6 NG/ML — LOW (ref 30–400)
FERRITIN SERPL-MCNC: 7 NG/ML — LOW (ref 30–400)
FOLATE SERPL-MCNC: 7.3 NG/ML — SIGNIFICANT CHANGE UP
GLUCOSE SERPL-MCNC: 84 MG/DL — SIGNIFICANT CHANGE UP (ref 70–99)
GLUCOSE UR QL: NEGATIVE — SIGNIFICANT CHANGE UP
HCT VFR BLD CALC: 33 % — LOW (ref 39–50)
HGB BLD-MCNC: 9.7 G/DL — LOW (ref 13–17)
HIV 1+2 AB+HIV1 P24 AG SERPL QL IA: SIGNIFICANT CHANGE UP
IMM GRANULOCYTES NFR BLD AUTO: 0.4 % — SIGNIFICANT CHANGE UP (ref 0–1.5)
INR BLD: 1.07 RATIO — SIGNIFICANT CHANGE UP (ref 0.88–1.16)
IRON SATN MFR SERPL: 16 UG/DL — LOW (ref 45–165)
IRON SATN MFR SERPL: 5 % — LOW (ref 16–55)
KETONES UR-MCNC: NEGATIVE — SIGNIFICANT CHANGE UP
LDH SERPL L TO P-CCNC: 210 U/L — SIGNIFICANT CHANGE UP (ref 50–242)
LEUKOCYTE ESTERASE UR-ACNC: NEGATIVE — SIGNIFICANT CHANGE UP
LYMPHOCYTES # BLD AUTO: 2.25 K/UL — SIGNIFICANT CHANGE UP (ref 1–3.3)
LYMPHOCYTES # BLD AUTO: 23.3 % — SIGNIFICANT CHANGE UP (ref 13–44)
MAGNESIUM SERPL-MCNC: 1.9 MG/DL — SIGNIFICANT CHANGE UP (ref 1.6–2.6)
MCHC RBC-ENTMCNC: 20.6 PG — LOW (ref 27–34)
MCHC RBC-ENTMCNC: 29.4 GM/DL — LOW (ref 32–36)
MCV RBC AUTO: 70.2 FL — LOW (ref 80–100)
MONOCYTES # BLD AUTO: 1.1 K/UL — HIGH (ref 0–0.9)
MONOCYTES NFR BLD AUTO: 11.4 % — SIGNIFICANT CHANGE UP (ref 2–14)
NEUTROPHILS # BLD AUTO: 6.09 K/UL — SIGNIFICANT CHANGE UP (ref 1.8–7.4)
NEUTROPHILS NFR BLD AUTO: 63 % — SIGNIFICANT CHANGE UP (ref 43–77)
NITRITE UR-MCNC: NEGATIVE — SIGNIFICANT CHANGE UP
NRBC # BLD: 0 /100 WBCS — SIGNIFICANT CHANGE UP (ref 0–0)
PH UR: 7 — SIGNIFICANT CHANGE UP (ref 5–8)
PHOSPHATE SERPL-MCNC: 4.1 MG/DL — SIGNIFICANT CHANGE UP (ref 2.5–4.5)
PLATELET # BLD AUTO: 496 K/UL — HIGH (ref 150–400)
POTASSIUM SERPL-MCNC: 4.2 MMOL/L — SIGNIFICANT CHANGE UP (ref 3.5–5.3)
POTASSIUM SERPL-SCNC: 4.2 MMOL/L — SIGNIFICANT CHANGE UP (ref 3.5–5.3)
PROCALCITONIN SERPL-MCNC: <0.03 NG/ML — SIGNIFICANT CHANGE UP (ref 0.02–0.1)
PROT SERPL-MCNC: 6.8 G/DL — SIGNIFICANT CHANGE UP (ref 6–8.3)
PROT UR-MCNC: NEGATIVE — SIGNIFICANT CHANGE UP
PROTHROM AB SERPL-ACNC: 12.8 SEC — SIGNIFICANT CHANGE UP (ref 10.6–13.6)
RBC # BLD: 4.7 M/UL — SIGNIFICANT CHANGE UP (ref 4.2–5.8)
RBC # BLD: 4.7 M/UL — SIGNIFICANT CHANGE UP (ref 4.2–5.8)
RBC # FLD: 18.6 % — HIGH (ref 10.3–14.5)
RBC CASTS # UR COMP ASSIST: 0 /HPF — SIGNIFICANT CHANGE UP (ref 0–4)
RETICS #: 52.5 K/UL — SIGNIFICANT CHANGE UP (ref 25–125)
RETICS/RBC NFR: 1.1 % — SIGNIFICANT CHANGE UP (ref 0.5–2.5)
SARS-COV-2 IGG+IGM SERPL QL IA: 209 INDEX — HIGH
SARS-COV-2 IGG+IGM SERPL QL IA: >250 U/ML — HIGH
SARS-COV-2 IGG+IGM SERPL QL IA: POSITIVE
SARS-COV-2 IGG+IGM SERPL QL IA: POSITIVE
SODIUM SERPL-SCNC: 138 MMOL/L — SIGNIFICANT CHANGE UP (ref 135–145)
SP GR SPEC: 1.01 — SIGNIFICANT CHANGE UP (ref 1.01–1.02)
TIBC SERPL-MCNC: 333 UG/DL — SIGNIFICANT CHANGE UP (ref 220–430)
TRANSFERRIN SERPL-MCNC: 278 MG/DL — SIGNIFICANT CHANGE UP (ref 200–360)
TROPONIN T, HIGH SENSITIVITY RESULT: 22 NG/L — SIGNIFICANT CHANGE UP (ref 0–51)
TSH SERPL-MCNC: 3.07 UIU/ML — SIGNIFICANT CHANGE UP (ref 0.27–4.2)
UIBC SERPL-MCNC: 317 UG/DL — SIGNIFICANT CHANGE UP (ref 110–370)
UROBILINOGEN FLD QL: NEGATIVE — SIGNIFICANT CHANGE UP
VIT B12 SERPL-MCNC: 355 PG/ML — SIGNIFICANT CHANGE UP (ref 232–1245)
WBC # BLD: 9.67 K/UL — SIGNIFICANT CHANGE UP (ref 3.8–10.5)
WBC # FLD AUTO: 9.67 K/UL — SIGNIFICANT CHANGE UP (ref 3.8–10.5)
WBC UR QL: 0 /HPF — SIGNIFICANT CHANGE UP (ref 0–5)

## 2021-12-01 PROCEDURE — 93010 ELECTROCARDIOGRAM REPORT: CPT

## 2021-12-01 PROCEDURE — 99233 SBSQ HOSP IP/OBS HIGH 50: CPT

## 2021-12-01 PROCEDURE — 99232 SBSQ HOSP IP/OBS MODERATE 35: CPT | Mod: GC

## 2021-12-01 RX ORDER — IRON SUCROSE 20 MG/ML
200 INJECTION, SOLUTION INTRAVENOUS ONCE
Refills: 0 | Status: COMPLETED | OUTPATIENT
Start: 2021-12-01 | End: 2021-12-01

## 2021-12-01 RX ORDER — OLANZAPINE 15 MG/1
5 TABLET, FILM COATED ORAL AT BEDTIME
Refills: 0 | Status: DISCONTINUED | OUTPATIENT
Start: 2021-12-01 | End: 2021-12-02

## 2021-12-01 RX ADMIN — Medication 105 MILLIGRAM(S): at 22:19

## 2021-12-01 RX ADMIN — Medication 1 MILLIGRAM(S): at 12:12

## 2021-12-01 RX ADMIN — Medication 2 MILLIGRAM(S): at 09:17

## 2021-12-01 RX ADMIN — Medication 105 MILLIGRAM(S): at 12:23

## 2021-12-01 RX ADMIN — Medication 3 MILLIGRAM(S): at 20:28

## 2021-12-01 RX ADMIN — IRON SUCROSE 110 MILLIGRAM(S): 20 INJECTION, SOLUTION INTRAVENOUS at 20:47

## 2021-12-01 RX ADMIN — Medication 2 MILLIGRAM(S): at 12:10

## 2021-12-01 RX ADMIN — Medication 2 MILLIGRAM(S): at 14:55

## 2021-12-01 RX ADMIN — Medication 2 MILLIGRAM(S): at 03:05

## 2021-12-01 RX ADMIN — Medication 105 MILLIGRAM(S): at 05:43

## 2021-12-01 RX ADMIN — OLANZAPINE 5 MILLIGRAM(S): 15 TABLET, FILM COATED ORAL at 20:27

## 2021-12-01 NOTE — CONSULT NOTE ADULT - SUBJECTIVE AND OBJECTIVE BOX
HPI:  Per H&P: Patient is a 51 year old male (likely undomiciled) with PMHx alcohol abuse, schizophrenia? p/w AMS. He was at [facility] last night saying he was tired and discharged at 7am. Police were called because patient was acting strangely at a bus stop and he was brought to the ED. He answered questions inappropriately and said "I'm scared of you." He flinched his body, acting as if he was going to attack, and then fell back down. He refused to ask questions about his medical history and denied any medications and allergies, and said, "Just leave me alone." He appeared disheveled with missing teeth and a red arabella on his forehead. He denies an trauma, but refused to answer questions about his ROS. Per patient, last drink was yesterday.     Neurology consulted for AMS per psych recommendations.    REVIEW OF SYSTEMS    A 10-system ROS was performed and is negative except for those items noted above and/or in the HPI.    PAST MEDICAL & SURGICAL HISTORY:    FAMILY HISTORY:    SOCIAL HISTORY:   T/E/D:   Occupation:   Lives with:     MEDICATIONS (HOME):  Home Medications:    MEDICATIONS  (STANDING):  enoxaparin Injectable 40 milliGRAM(s) SubCutaneous daily  folic acid 1 milliGRAM(s) Oral daily  multivitamin 1 Tablet(s) Oral daily  thiamine IVPB 500 milliGRAM(s) IV Intermittent three times a day    MEDICATIONS  (PRN):  LORazepam     Tablet 2 milliGRAM(s) Oral every 2 hours PRN Threatening behavior  LORazepam     Tablet 2 milliGRAM(s) Oral every 1 hour PRN Symptom-triggered: each CIWA -Ar score 8 or GREATER  LORazepam   Injectable 2 milliGRAM(s) IV Push every 1 hour PRN Symptom-triggered: each CIWA -Ar score 8 or GREATER  melatonin 3 milliGRAM(s) Oral at bedtime PRN Insomnia  ondansetron Injectable 4 milliGRAM(s) IV Push every 8 hours PRN Nausea and/or Vomiting    ALLERGIES/INTOLERANCES:  Allergies  No Known Allergies    Intolerances    VITALS & EXAMINATION:  Vital Signs Last 24 Hrs  T(C): 36.3 (01 Dec 2021 14:41), Max: 36.9 (30 Nov 2021 19:53)  T(F): 97.4 (01 Dec 2021 14:41), Max: 98.5 (01 Dec 2021 02:51)  HR: 87 (01 Dec 2021 14:41) (76 - 102)  BP: 145/87 (01 Dec 2021 14:41) (122/78 - 154/99)  BP(mean): --  RR: 19 (01 Dec 2021 14:41) (16 - 19)  SpO2: 93% (01 Dec 2021 14:41) (93% - 100%)    General:  Constitutional: Obese Male, appears stated age, in no apparent distress including pain  Head: Normocephalic & atraumatic.  ENT: Patent ear canals, intact TM, mucus membranes moist & pink, neck supple, no lymphadenopathy.   Respiratory: Patent airway. All lung fields are clear to auscultation bilaterally.  Extremities: No cyanosis, clubbing, or edema.  Skin: No rashes, bruising, or discoloration.    Neurological (>12):  MS: Awake, alert, oriented to person, place, situation, time. Normal affect. Follows all commands.    Language: Speech is clear, fluent with good repetition & comprehension (able to name objects___)    CNs: PERRLA (R = 3mm, L = 3mm). VFF. EOMI no nystagmus, no diplopia. V1-3 intact to LT/pinprick, well developed masseter muscles b/l. No facial asymmetry b/l, full eye closure strength b/l. Hearing grossly normal (rubbing fingers) b/l. Symmetric palate elevation in midline. Gag reflex deferred. Head turning & shoulder shrug intact b/l. Tongue midline, normal movements, no atrophy.    Fundoscopic: pale w/ sharp discs margins No vascular changes.      Motor: Normal muscle bulk & tone. No noticeable tremor or seizure. No pronator drift.              Deltoid	Biceps	Triceps	Wrist	Finger ABd	   R	5	5	5	5	5		5 	  L	5	5	5	5	5		5    	H-Flex	H-Ext	H-ABd	H-ADd	K-Flex	K-Ext	D-Flex	P-Flex  R	5	5	5	5	5	5	5	5 	   L	5	5	5	5	5	5	5	5	     Sensation: Intact to LT/PP/Temp/Vibration/Position b/l throughout.     Cortical: Extinction on DSS (neglect): none    Reflexes:              Biceps(C5)       BR(C6)     Triceps(C7)               Patellar(L4)    Achilles(S1)    Plantar Resp  R	2	          2	             2		        2		    2		Down   L	2	          2	             2		        2		    2		Down     Coordination: intact rapid-alt movements. No dysmetria to FTN/HTS    Gait: Normal Romberg. No postural instability. Normal stance and tandem gait.     LABORATORY:  CBC                       9.7    9.67  )-----------( 496      ( 01 Dec 2021 06:50 )             33.0     Chem 12-01    138  |  103  |  11  ----------------------------<  84  4.2   |  23  |  0.62    Ca    9.2      01 Dec 2021 06:57  Phos  4.1     12-01  Mg     1.9     12-01    TPro  6.8  /  Alb  3.7  /  TBili  0.2  /  DBili  x   /  AST  13  /  ALT  13  /  AlkPhos  78  12-01    LFTs LIVER FUNCTIONS - ( 01 Dec 2021 06:57 )  Alb: 3.7 g/dL / Pro: 6.8 g/dL / ALK PHOS: 78 U/L / ALT: 13 U/L / AST: 13 U/L / GGT: x           Coagulopathy PT/INR - ( 01 Dec 2021 06:50 )   PT: 12.8 sec;   INR: 1.07 ratio         PTT - ( 01 Dec 2021 06:50 )  PTT:32.1 sec  Lipid Panel   A1c   Cardiac enzymes CARDIAC MARKERS ( 01 Dec 2021 06:57 )  x     / x     / 69 U/L / x     / x          STUDIES & IMAGING:  Studies (EKG, EEG, EMG, etc):     Radiology (XR, CT, MR, U/S, TTE/CON):    < from: CT Head No Cont (11.29.21 @ 16:41) >  EXAM:  CT BRAIN                            *** ADDENDUM 11/29/2021  ***    CT OF THE HEAD WITHOUT CONTRAST    CLINICAL INDICATION: Change in mental status.    TECHNIQUE: Volumetric CT acquisition was performed through the brain and reviewed using brain and bone window technique. Dose optimization techniques were utilized including kVp/mA modulation along with iterative reconstructions.      COMPARISON: No prior studies have been submitted for comparison.    FINDINGS:    The ventricular and sulcal size and configuration is age appropriate.   There is no acute loss of gray-white differentiation.    There is no evidence of mass effect, midline shift, acute intracranial hemorrhage, or extra-axial collections.     The calvarium is intact. Severemucosal thickening in the right maxillary sinus. Moderate mucosal thickening and secretions within the left maxillary sinus. Moderate mucosal thickening in the ethmoid air cells and right frontal sinus.The mastoid air cells are predominantly clear. The orbits are unremarkable.      IMPRESSION:  No acute intracranial hemorrhage or acute territorial infarction.  Sinus inflammatory changes as described.    --- End of Report ---    *** END OF ADDENDUM 11/29/2021  ***      < end of copied text >   HPI:  Per H&P: Patient is a 51 year old male (likely undomiciled) with PMHx alcohol abuse, schizophrenia? p/w AMS. He was at [facility] last night saying he was tired and discharged at 7am. Police were called because patient was acting strangely at a bus stop and he was brought to the ED. He answered questions inappropriately and said "I'm scared of you." He flinched his body, acting as if he was going to attack, and then fell back down. He refused to ask questions about his medical history and denied any medications and allergies, and said, "Just leave me alone." He appeared disheveled with missing teeth and a red arabella on his forehead. He denies an trauma, but refused to answer questions about his ROS. Per patient, last drink was yesterday.     Neurology consulted for AMS per psych recommendations. Pt agitated, aggressive and uncooperative with exam. Pt reports multiple falls but not able to qualify any details. Pt swearing repeatedly, answering some questions appropriately, no obvious language deficit and follows some 1 step commands. No collateral able to be obtained by family.    REVIEW OF SYSTEMS    A 10-system ROS was performed and is negative except for those items noted above and/or in the HPI.    PAST MEDICAL & SURGICAL HISTORY:    FAMILY HISTORY:    SOCIAL HISTORY:   T/E/D:   Occupation:   Lives with:     MEDICATIONS (HOME):  Home Medications:    MEDICATIONS  (STANDING):  enoxaparin Injectable 40 milliGRAM(s) SubCutaneous daily  folic acid 1 milliGRAM(s) Oral daily  multivitamin 1 Tablet(s) Oral daily  thiamine IVPB 500 milliGRAM(s) IV Intermittent three times a day    MEDICATIONS  (PRN):  LORazepam     Tablet 2 milliGRAM(s) Oral every 2 hours PRN Threatening behavior  LORazepam     Tablet 2 milliGRAM(s) Oral every 1 hour PRN Symptom-triggered: each CIWA -Ar score 8 or GREATER  LORazepam   Injectable 2 milliGRAM(s) IV Push every 1 hour PRN Symptom-triggered: each CIWA -Ar score 8 or GREATER  melatonin 3 milliGRAM(s) Oral at bedtime PRN Insomnia  ondansetron Injectable 4 milliGRAM(s) IV Push every 8 hours PRN Nausea and/or Vomiting    ALLERGIES/INTOLERANCES:  Allergies  No Known Allergies    Intolerances    VITALS & EXAMINATION:  Vital Signs Last 24 Hrs  T(C): 36.3 (01 Dec 2021 14:41), Max: 36.9 (30 Nov 2021 19:53)  T(F): 97.4 (01 Dec 2021 14:41), Max: 98.5 (01 Dec 2021 02:51)  HR: 87 (01 Dec 2021 14:41) (76 - 102)  BP: 145/87 (01 Dec 2021 14:41) (122/78 - 154/99)  BP(mean): --  RR: 19 (01 Dec 2021 14:41) (16 - 19)  SpO2: 93% (01 Dec 2021 14:41) (93% - 100%)    General:  Constitutional: Obese Male, appears stated age, in no apparent distress including pain, disheveled with food particles everyone  Head: Normocephalic & atraumatic.  Respiratory: No increased work of breathing  Extremities: No cyanosis, clubbing, or edema.  Skin: No rashes, bruising, or discoloration.    Neurological (>12):  MS: Awake, alert, refusing to answer orientation questions. Agitated. Follows some but not all commands (mostly due to pt refusal)    Language: Speech is clear, fluent with unable to test repetition & comprehension (following some but not all commands)    CNs: EOMI horizontally. No facial asymmetry b/l at rest, hearing intact to conversation. Rest of CN    Motor: Normal muscle bulk. No noticeable tremor or seizure. No pronator drift. Moving b/l LE equally, at least antigravity. B/l UE with no drift    Sensation: Intact to LT b/l throughout.     Reflexes: unable to assess due to pt agitation    Coordination: unable to assess due to pt agitation    Gait: unable to assess due to pt agitation    LABORATORY:  CBC                       9.7    9.67  )-----------( 496      ( 01 Dec 2021 06:50 )             33.0     Chem 12-01    138  |  103  |  11  ----------------------------<  84  4.2   |  23  |  0.62    Ca    9.2      01 Dec 2021 06:57  Phos  4.1     12-01  Mg     1.9     12-01    TPro  6.8  /  Alb  3.7  /  TBili  0.2  /  DBili  x   /  AST  13  /  ALT  13  /  AlkPhos  78  12-01    LFTs LIVER FUNCTIONS - ( 01 Dec 2021 06:57 )  Alb: 3.7 g/dL / Pro: 6.8 g/dL / ALK PHOS: 78 U/L / ALT: 13 U/L / AST: 13 U/L / GGT: x           Coagulopathy PT/INR - ( 01 Dec 2021 06:50 )   PT: 12.8 sec;   INR: 1.07 ratio         PTT - ( 01 Dec 2021 06:50 )  PTT:32.1 sec  Lipid Panel   A1c   Cardiac enzymes CARDIAC MARKERS ( 01 Dec 2021 06:57 )  x     / x     / 69 U/L / x     / x          STUDIES & IMAGING:  Studies (EKG, EEG, EMG, etc):     Radiology (XR, CT, MR, U/S, TTE/CON):    < from: CT Head No Cont (11.29.21 @ 16:41) >  EXAM:  CT BRAIN                            *** ADDENDUM 11/29/2021  ***    CT OF THE HEAD WITHOUT CONTRAST    CLINICAL INDICATION: Change in mental status.    TECHNIQUE: Volumetric CT acquisition was performed through the brain and reviewed using brain and bone window technique. Dose optimization techniques were utilized including kVp/mA modulation along with iterative reconstructions.      COMPARISON: No prior studies have been submitted for comparison.    FINDINGS:    The ventricular and sulcal size and configuration is age appropriate.   There is no acute loss of gray-white differentiation.    There is no evidence of mass effect, midline shift, acute intracranial hemorrhage, or extra-axial collections.     The calvarium is intact. Severemucosal thickening in the right maxillary sinus. Moderate mucosal thickening and secretions within the left maxillary sinus. Moderate mucosal thickening in the ethmoid air cells and right frontal sinus.The mastoid air cells are predominantly clear. The orbits are unremarkable.      IMPRESSION:  No acute intracranial hemorrhage or acute territorial infarction.  Sinus inflammatory changes as described.    --- End of Report ---    *** END OF ADDENDUM 11/29/2021  ***      < end of copied text >

## 2021-12-01 NOTE — BH CONSULTATION LIAISON PROGRESS NOTE - NSBHFUPINTERVALHXFT_PSY_A_CORE
Seen and evaluated, responds and sticks out his tongue on command.  Patient yelling profanities, racial slurs.  States that he is in a hospital, when asked about locations answers "[address]".  When asked about AH reports that has them, states voices tell him "kill me, kill me", repeatedly, does not elaborate on specific plan or intent.  States has thoughts to harm others and reports "kill you, kill you."  When asked about his PPHx, states he saw a "Dr. Clancy", but does not answer as to when he last saw this provider or what medications he has tried, asked about KIRK in the past but does not answer.  Patient cursing, telling writer "your annoying", refuses to engage any longer with interview.    Per staff at bedside, patient with frequent verbal outburst, at times seen talking to himself.  Per primary attending, patient hallucinating yesterday, hearing voices, unclear if alcohol withdrawal vs primary psychiatric d/o.   Seen and evaluated, responds and sticks out his tongue on command.  Patient yelling profanities, racial slurs.  States that he is in a hospital, when asked about locations answers "[id number]".  When asked about AH reports that has them, states voices tell him "kill me, kill me", repeatedly, does not elaborate on specific plan or intent.  States has thoughts to harm others and reports "kill you, kill you."  When asked about his PPHx, states he saw a "Dr. Clancy", but does not answer as to when he last saw this provider or what medications he has tried, asked about KIRK in the past but does not answer.  Patient cursing, telling writer "you're annoying", refuses to engage any longer with interview.    Per staff at bedside, patient with frequent verbal outburst, at times seen talking to himself.  Per primary attending, patient hallucinating yesterday, hearing voices, unclear if alcohol withdrawal vs primary psychiatric d/o.

## 2021-12-01 NOTE — CONSULT NOTE ADULT - ATTENDING COMMENTS
51 year old male (likely undomiciled) with PMHx alcohol abuse, schizophrenia? p/w AMS. He was at [facility] last night saying he was tired and discharged at 7am. Police were called because patient was acting strangely at a bus stop and he was brought to the ED. Neurology consulted for AMS. On exam oriented to self an place not month follows commands moves all ext antigravity CTH reviewed no acute findings very low suspicion for a primary neurologic process      Management as per psych continue with labs above   -No further inpatient Neurologic workup at this time

## 2021-12-01 NOTE — PROGRESS NOTE ADULT - PROBLEM SELECTOR PLAN 1
BAL negative. Per patient, history of alcohol use disorder. (+) Benzo.  - Placed on CIWA protocol with symptom triggered Ativan  - started thiamine 500 mg IV TID x 3 days then go to thiamine 100 mg daily  - f/u TSH  - started folate 1 mg daily  - Social work consult  -  following: apprec recs BAL negative. Per patient, history of alcohol use disorder. (+) Benzo.  - Placed on CIWA protocol with symptom triggered Ativan  - started thiamine 500 mg IV TID x 3 days then go to thiamine 100 mg daily  - f/u TSH  - started folate 1 mg daily  - Social work consult  -  following:    = CIWA, thiamine/MVI/folic acid.  Thiamine 500mg IV tid x9 doses    = PRN: Ativan 2mg PO q2h PRN sx-triggered CIWA    =  for substance abuse referral resources.  OP psych f/u at [facility]: [phone]    = Check: TSH, B12, folate, RPR, UA, consider MRI head, EEG, HIV, neuro evaluation

## 2021-12-01 NOTE — PROGRESS NOTE ADULT - ATTENDING COMMENTS
50 yo male (likely undomiciled) with PMHx concerning for alcohol abuse and schizophrenia though unable to obtain clear history due to patients encephalopathy, seen by psychiatry and placed on CIWA protocol for possible withdrawal however also may be due to underlying schizophrenia, incidentally found to be covid positive.  1. encephalopathy: appreciate psych consult.  Cannot r/o etoh withdrawal, though utox only showing benzos and etoh level negative.  Has elevated ciwa in discussion with nurse however unclear if this is accurate with underlying schizophrenia (high ciwa due to hearing voices/seeing things but not tremulous/diaphoretic on exam).  Will treat with ciwa.  Also check tsh, b12, folate, rpr, ua, hiv.  Treat with high dose thiamine, mvi, folate.  Haldol prn agitation.  eeg. Neuro c/s.  I spoke with Dr Espino (psych) today-due to inability to obtain collateral information requests neuro c/s to r/o all organic causes  2. etoh withdrawal: as above with ativan prn, mvi/folate/thiamine, social work c/s  3. schizophrenia: social work, psych consults.  Start zyprexa per psych  4. COVID: not hypoxic, no cough/sob noted on exam.  Not candidate for treatment at this time.  CXR reviewed-no pneumonia.  If Oxygen falls <94% may be candidate for treatment.  5. ppx: lovenox due to +covid    Beeper: [phone] . 50 yo male (likely undomiciled) with PMHx concerning for alcohol abuse and schizophrenia though unable to obtain clear history due to patients encephalopathy, seen by psychiatry and placed on CIWA protocol for possible withdrawal however also may be due to underlying schizophrenia, incidentally found to be covid positive.  1. encephalopathy: appreciate psych consult.  Cannot r/o etoh withdrawal, though utox only showing benzos and etoh level negative.  Has elevated ciwa in discussion with nurse however unclear if this is accurate with underlying schizophrenia (high ciwa due to hearing voices/seeing things but not tremulous/diaphoretic on exam).  Will treat with ciwa.  Also check tsh, b12, folate, rpr, ua, hiv.  Treat with high dose thiamine, mvi, folate.  Haldol prn agitation.  eeg. Neuro c/s.  I spoke with Dr Espino (psych) today-due to inability to obtain collateral information requests neuro c/s to r/o all organic causes  2. etoh withdrawal: as above with ativan prn, mvi/folate/thiamine, social work c/s  3. schizophrenia: social work, psych consults.  Start zyprexa per psych  4. COVID: not hypoxic, no cough/sob noted on exam.  Not candidate for treatment at this time.  CXR reviewed-no pneumonia.  If Oxygen falls <94% may be candidate for treatment.  5. iron deficient anemia: start iron/vit c.  Not able to consent for c-scope at this time.  Outpatient w/u.  6. ppx: lovenox due to +covid    Beeper: [phone] .

## 2021-12-01 NOTE — PROGRESS NOTE ADULT - PROBLEM SELECTOR PLAN 3
Hgb 9s on admission  - f/u iron studies, B12, folate Hgb 9s on admission  - Iron deficient, microcytic anemia >> 200mg IV Iron

## 2021-12-01 NOTE — CONSULT NOTE ADULT - ASSESSMENT
Impression:    Plan:  [] can consider labs for reversible causes of AMS: TSH (wnl), Folate (wnl), B12 (wnl), MMA, Iron studies, ammonia (wnl), HIV, RPR, copper/ zinc, calcium (wnl), vitamin B1, B2, B5, B6, vitamin E, tox screen, heavy metals, ceruloplasmin, UA (neg)  [] would not recommend LP at this time  [] delirium recommendations:  Environmental interventions can be helpful. Taking into account risks/benefits:  A. Minimize the use of medications, especially anticholinergics, antihistaminics, opiates, sedative hypnotics and benzodiazepines. Pain medications should be used at lowest effective dose.  B. Minimize procedures, use of bladder catheters and restraints to the best extent possible.  C. Encourage natural sleep at night, with minimal interruptions. Keep room dark and quiet.  Consider consolidation of neuro checks with vital signs and blood draws.  D. Encourage wakefulness during the day. Keep room well-lit  E. Encourage use of assistive devices such as glasses, hearing aids, and walkers  F. Have staff/family frequently re-orient patients  G. Advise family members that delirium often lingers for days or weeks after correction of metabolic impairments, acute renal or hepatic dysfunction, infections, surgical procedures, hospitalizations, administration of delirium-inducing medications including antibiotics.  [] rest of management of psychosis per psychiatry and primary team    Case to be seen and discussed with Dr. Osorio 51M (likely undomiciled) with PMHx alcohol abuse, schizophrenia? p/w AMS. Police were called because patient was acting strangely at a bus stop and he was brought to the ED. He answered questions inappropriately and said "I'm scared of you." He flinched his body, acting as if he was going to attack, and then fell back down. He refused to ask questions about his medical history and denied any medications and allergies, and said, "Just leave me alone." He appeared disheveled with missing teeth and a red arabella on his forehead. Per patient, last drink was day prior to admission. Neurology consulted for AMS per psych recommendations. Pt agitated, aggressive and uncooperative with exam. Pt reports multiple falls but not able to qualify any details. Neuro exam extremely limited due to agitation and uncooperation, no obvious focality. CTH neg, sinus inflammation.     Impression: Agitation and psychosis, unclear etiology and duration. Neurological exam limited by agitation. Low suspicion for AIE. R/o reversible causes of dementia    Plan:  [] can consider labs for reversible causes of AMS: TSH (wnl), Folate (wnl), B12 (wnl), MMA, Iron studies, ammonia (wnl), HIV, RPR, copper/ zinc, calcium (wnl), vitamin B1, B2, B5, B6, vitamin E, tox screen (+benzos, received in hospital), heavy metal screen, ceruloplasmin, UA (neg), ETOH level  [] supplement vitamins as needed if low  [] toxic/metabolic/infectious workup per primary team  [] would not recommend LP at this time  [] delirium recommendations:  Environmental interventions can be helpful. Taking into account risks/benefits:  A. Minimize the use of medications, especially anticholinergics, antihistaminics, opiates, sedative hypnotics and benzodiazepines. Pain medications should be used at lowest effective dose.  B. Minimize procedures, use of bladder catheters and restraints to the best extent possible.  C. Encourage natural sleep at night, with minimal interruptions. Keep room dark and quiet.  Consider consolidation of neuro checks with vital signs and blood draws.  D. Encourage wakefulness during the day. Keep room well-lit  E. Encourage use of assistive devices such as glasses, hearing aids, and walkers  F. Have staff/family frequently re-orient patients  G. Advise family members that delirium often lingers for days or weeks after correction of metabolic impairments, acute renal or hepatic dysfunction, infections, surgical procedures, hospitalizations, administration of delirium-inducing medications including antibiotics.  [] recommend continuing CIWA and thiamine, folic acid, MVT  [] recommend obtaining collateral with family  [] rest of management of acute psychosis per psychiatry and primary team    Case to be seen and discussed with Dr. Osorio 51M (likely undomiciled) with PMHx alcohol abuse, schizophrenia? p/w AMS. Police were called because patient was acting strangely at a bus stop and he was brought to the ED. He answered questions inappropriately and said "I'm scared of you." He flinched his body, acting as if he was going to attack, and then fell back down. He refused to ask questions about his medical history and denied any medications and allergies, and said, "Just leave me alone." He appeared disheveled with missing teeth and a red arabella on his forehead. Per patient, last drink was day prior to admission. Neurology consulted for AMS per psych recommendations. Pt agitated, aggressive and uncooperative with exam. Pt reports multiple falls but not able to qualify any details. Neuro exam extremely limited due to agitation and uncooperation, no obvious focality. CTH neg, sinus inflammation.     Impression: Agitation and psychosis, unclear etiology and duration. Neurological exam limited by agitation. Low suspicion for AIE. R/o reversible causes of dementia    Plan:  [] can consider labs for reversible causes of AMS: TSH (wnl), Folate (wnl), B12 (wnl), MMA, ammonia (wnl), HIV, RPR, copper/ zinc, calcium (wnl), vitamin B1, B2, B5, B6, vitamin E, tox screen (+benzos, received in hospital), heavy metal screen, ceruloplasmin, UA (neg), ETOH level (neg)  [] supplement vitamins as needed if low  [] toxic/metabolic/infectious workup per primary team  [] would not recommend LP at this time  [] delirium recommendations:  Environmental interventions can be helpful. Taking into account risks/benefits:  A. Minimize the use of medications, especially anticholinergics, antihistaminics, opiates, sedative hypnotics and benzodiazepines. Pain medications should be used at lowest effective dose.  B. Minimize procedures, use of bladder catheters and restraints to the best extent possible.  C. Encourage natural sleep at night, with minimal interruptions. Keep room dark and quiet.  Consider consolidation of neuro checks with vital signs and blood draws.  D. Encourage wakefulness during the day. Keep room well-lit  E. Encourage use of assistive devices such as glasses, hearing aids, and walkers  F. Have staff/family frequently re-orient patients  G. Advise family members that delirium often lingers for days or weeks after correction of metabolic impairments, acute renal or hepatic dysfunction, infections, surgical procedures, hospitalizations, administration of delirium-inducing medications including antibiotics.  [] recommend continuing CIWA and thiamine, folic acid, MVT  [] recommend obtaining collateral with family  [] rest of management of acute psychosis per psychiatry and primary team    Case to be seen and discussed with Dr. Osorio 51M (likely undomiciled) with PMHx alcohol abuse, schizophrenia? p/w AMS. Police were called because patient was acting strangely at a bus stop and he was brought to the ED. He answered questions inappropriately and said "I'm scared of you." He flinched his body, acting as if he was going to attack, and then fell back down. He refused to ask questions about his medical history and denied any medications and allergies, and said, "Just leave me alone." He appeared disheveled with missing teeth and a red arabella on his forehead. Per patient, last drink was day prior to admission. Neurology consulted for AMS per psych recommendations. Pt agitated, aggressive and uncooperative with exam. Pt reports multiple falls but not able to qualify any details. Neuro exam extremely limited due to agitation and uncooperation, no obvious focality. CTH neg, sinus inflammation.     Impression: Agitation and psychosis, unclear etiology and duration. Neurological exam limited by agitation. Low suspicion for AIE. R/o reversible causes of dementia    Plan:  [] can consider labs for reversible causes of AMS: TSH (wnl), Folate (wnl), B12 (wnl), MMA, ammonia (wnl), HIV, RPR, copper/ zinc, calcium (wnl), vitamin B1, B2, B5, B6, vitamin E, tox screen (+benzos, received in hospital), heavy metal screen, ceruloplasmin, UA (neg), ETOH level (neg)  [] supplement vitamins as needed if low  [] toxic/metabolic/infectious workup per primary team  [] would not recommend LP at this time, no EEG or MRI at this time  [] delirium recommendations:  Environmental interventions can be helpful. Taking into account risks/benefits:  A. Minimize the use of medications, especially anticholinergics, antihistaminics, opiates, sedative hypnotics and benzodiazepines. Pain medications should be used at lowest effective dose.  B. Minimize procedures, use of bladder catheters and restraints to the best extent possible.  C. Encourage natural sleep at night, with minimal interruptions. Keep room dark and quiet.  Consider consolidation of neuro checks with vital signs and blood draws.  D. Encourage wakefulness during the day. Keep room well-lit  E. Encourage use of assistive devices such as glasses, hearing aids, and walkers  F. Have staff/family frequently re-orient patients  G. Advise family members that delirium often lingers for days or weeks after correction of metabolic impairments, acute renal or hepatic dysfunction, infections, surgical procedures, hospitalizations, administration of delirium-inducing medications including antibiotics.  [] recommend continuing CIWA and thiamine, folic acid, MVT  [] recommend obtaining collateral with family  [] rest of management of acute psychosis per psychiatry and primary team    Case to be seen and discussed with Dr. Osorio

## 2021-12-01 NOTE — PROGRESS NOTE ADULT - SUBJECTIVE AND OBJECTIVE BOX
PROGRESS NOTE:     Patient is a 51y old  Male who presents with a chief complaint of COVID and ETOH withdrawal ? (30 Nov 2021 14:02)      SUBJECTIVE / OVERNIGHT EVENTS:    ADDITIONAL REVIEW OF SYSTEMS:    MEDICATIONS  (STANDING):  enoxaparin Injectable 40 milliGRAM(s) SubCutaneous daily  folic acid 1 milliGRAM(s) Oral daily  thiamine IVPB 500 milliGRAM(s) IV Intermittent three times a day    MEDICATIONS  (PRN):  LORazepam     Tablet 2 milliGRAM(s) Oral every 2 hours PRN Threatening behavior  LORazepam     Tablet 2 milliGRAM(s) Oral every 1 hour PRN Symptom-triggered: each CIWA -Ar score 8 or GREATER  LORazepam   Injectable 2 milliGRAM(s) IV Push every 1 hour PRN Symptom-triggered: each CIWA -Ar score 8 or GREATER  melatonin 3 milliGRAM(s) Oral at bedtime PRN Insomnia  ondansetron Injectable 4 milliGRAM(s) IV Push every 8 hours PRN Nausea and/or Vomiting      CAPILLARY BLOOD GLUCOSE        I&O's Summary    30 Nov 2021 07:01  -  01 Dec 2021 07:00  --------------------------------------------------------  IN: 920 mL / OUT: 400 mL / NET: 520 mL        PHYSICAL EXAM:  Vital Signs Last 24 Hrs  T(C): 36.3 (01 Dec 2021 05:44), Max: 37.1 (30 Nov 2021 13:34)  T(F): 97.3 (01 Dec 2021 05:44), Max: 98.7 (30 Nov 2021 13:34)  HR: 89 (01 Dec 2021 05:44) (76 - 101)  BP: 148/97 (01 Dec 2021 05:44) (122/78 - 154/92)  BP(mean): --  RR: 17 (01 Dec 2021 05:44) (16 - 18)  SpO2: 96% (01 Dec 2021 05:44) (96% - 100%)    CONSTITUTIONAL: NAD, well-developed  RESPIRATORY: Normal respiratory effort; lungs are clear to auscultation bilaterally  CARDIOVASCULAR: Regular rate and rhythm, normal S1 and S2, no murmur/rub/gallop; No lower extremity edema; Peripheral pulses are 2+ bilaterally  ABDOMEN: Nontender to palpation, normoactive bowel sounds, no rebound/guarding; No hepatosplenomegaly  MUSCULOSKELETAL: no clubbing or cyanosis of digits; no joint swelling or tenderness to palpation  PSYCH: A+O to person, place, and time; affect appropriate    LABS:                        9.7    9.67  )-----------( 496      ( 01 Dec 2021 06:50 )             33.0     11-30    136  |  104  |  13  ----------------------------<  87  4.7   |  23  |  0.57    Ca    9.0      30 Nov 2021 08:18  Phos  3.3     11-29  Mg     2.0     11-29    TPro  6.7  /  Alb  3.6  /  TBili  0.2  /  DBili  x   /  AST  18  /  ALT  16  /  AlkPhos  76  11-29    PT/INR - ( 01 Dec 2021 06:50 )   PT: 12.8 sec;   INR: 1.07 ratio         PTT - ( 01 Dec 2021 06:50 )  PTT:32.1 sec            RADIOLOGY & ADDITIONAL TESTS:  Results Reviewed:   Imaging Personally Reviewed:  Electrocardiogram Personally Reviewed:    COORDINATION OF CARE:  Care Discussed with Consultants/Other Providers [Y/N]:  Prior or Outpatient Records Reviewed [Y/N]:      ******************************  Authored By: Jermaine Don MD PGY1  Internal Medicine  Pager: [phone]  MS Teams  ******************************   PROGRESS NOTE:     Patient is a 51y old  Male who presents with a chief complaint of COVID and ETOH withdrawal ? (30 Nov 2021 14:02)      SUBJECTIVE / OVERNIGHT EVENTS: Overnight received IV ativan for agitation and aggressive behavior. Pt this morning still altered, responding to my questions with grunts and non-sensical statements. Pt says he's in pain everywhere, says yes before I ask questions. No acute events on telemetry.    ADDITIONAL REVIEW OF SYSTEMS:    MEDICATIONS  (STANDING):  enoxaparin Injectable 40 milliGRAM(s) SubCutaneous daily  folic acid 1 milliGRAM(s) Oral daily  thiamine IVPB 500 milliGRAM(s) IV Intermittent three times a day    MEDICATIONS  (PRN):  LORazepam     Tablet 2 milliGRAM(s) Oral every 2 hours PRN Threatening behavior  LORazepam     Tablet 2 milliGRAM(s) Oral every 1 hour PRN Symptom-triggered: each CIWA -Ar score 8 or GREATER  LORazepam   Injectable 2 milliGRAM(s) IV Push every 1 hour PRN Symptom-triggered: each CIWA -Ar score 8 or GREATER  melatonin 3 milliGRAM(s) Oral at bedtime PRN Insomnia  ondansetron Injectable 4 milliGRAM(s) IV Push every 8 hours PRN Nausea and/or Vomiting      CAPILLARY BLOOD GLUCOSE        I&O's Summary    30 Nov 2021 07:01  -  01 Dec 2021 07:00  --------------------------------------------------------  IN: 920 mL / OUT: 400 mL / NET: 520 mL        PHYSICAL EXAM:  Vital Signs Last 24 Hrs  T(C): 36.3 (01 Dec 2021 05:44), Max: 37.1 (30 Nov 2021 13:34)  T(F): 97.3 (01 Dec 2021 05:44), Max: 98.7 (30 Nov 2021 13:34)  HR: 89 (01 Dec 2021 05:44) (76 - 101)  BP: 148/97 (01 Dec 2021 05:44) (122/78 - 154/92)  BP(mean): --  RR: 17 (01 Dec 2021 05:44) (16 - 18)  SpO2: 96% (01 Dec 2021 05:44) (96% - 100%)    GENERAL: Disheveled, poor dentition; laying in bed eyes closed  HEAD:  Atraumatic, Normocephalic  EYES: EOMI, PERRLA, conjunctiva and sclera clear  NECK: Supple, good ROM  NERVOUS SYSTEM:  Alert & Oriented X1, Motor Strength 5/5 B/L upper and lower extremities  CHEST/LUNG: Clear to auscultation b/l  HEART: Regular rate and rhythm  ABDOMEN: Soft, Nontender, Nondistended; Bowel sounds present  EXTREMITIES:  No clubbing, cyanosis, or edema  PSYCH: A&Ox1; incoherent, flight of ideas/statements intermittently    LABS:                        9.7    9.67  )-----------( 496      ( 01 Dec 2021 06:50 )             33.0     11-30    136  |  104  |  13  ----------------------------<  87  4.7   |  23  |  0.57    Ca    9.0      30 Nov 2021 08:18  Phos  3.3     11-29  Mg     2.0     11-29    TPro  6.7  /  Alb  3.6  /  TBili  0.2  /  DBili  x   /  AST  18  /  ALT  16  /  AlkPhos  76  11-29    PT/INR - ( 01 Dec 2021 06:50 )   PT: 12.8 sec;   INR: 1.07 ratio         PTT - ( 01 Dec 2021 06:50 )  PTT:32.1 sec            RADIOLOGY & ADDITIONAL TESTS:  Results Reviewed:   Imaging Personally Reviewed:  Electrocardiogram Personally Reviewed:    COORDINATION OF CARE:  Care Discussed with Consultants/Other Providers [Y/N]:  Prior or Outpatient Records Reviewed [Y/N]:      ******************************  Authored By: Jermaine Don MD PGY1  Internal Medicine  Pager: [phone]  MS Teams  ******************************

## 2021-12-01 NOTE — PROGRESS NOTE ADULT - PROBLEM SELECTOR PLAN 2
Per patient, history of schizophrenia not on any medications.   - Haldol 2.5 - 5 mg IV Q6h PRN for agitation if QTc < 500  - SBIRT consult: apprec recs  -  following: apprec recs Per patient, history of schizophrenia not on any medications.   - Haldol 2.5 - 5 mg IV Q6h PRN for agitation if QTc < 500  - SBIRT consult: apprec recs  -  following:    = Zyprexa Zydis 5mg qHS (monitor qtc <500ms on ekg)    = EKG last QTc 490ms; will repeat tomorrow    = Neurology consult to r/o organic causes of AMS given poor psych hx, poor historian    = EEG ordered  - Neurology consulted

## 2021-12-02 LAB
ANION GAP SERPL CALC-SCNC: 11 MMOL/L — SIGNIFICANT CHANGE UP (ref 5–17)
BUN SERPL-MCNC: 12 MG/DL — SIGNIFICANT CHANGE UP (ref 7–23)
CALCIUM SERPL-MCNC: 8.7 MG/DL — SIGNIFICANT CHANGE UP (ref 8.4–10.5)
CHLORIDE SERPL-SCNC: 105 MMOL/L — SIGNIFICANT CHANGE UP (ref 96–108)
CO2 SERPL-SCNC: 23 MMOL/L — SIGNIFICANT CHANGE UP (ref 22–31)
CREAT SERPL-MCNC: 0.64 MG/DL — SIGNIFICANT CHANGE UP (ref 0.5–1.3)
FERRITIN SERPL-MCNC: 15 NG/ML — LOW (ref 30–400)
GLUCOSE SERPL-MCNC: 111 MG/DL — HIGH (ref 70–99)
HCT VFR BLD CALC: 33.2 % — LOW (ref 39–50)
HGB BLD-MCNC: 9.9 G/DL — LOW (ref 13–17)
IRON SATN MFR SERPL: 234 UG/DL — HIGH (ref 45–165)
MAGNESIUM SERPL-MCNC: 1.9 MG/DL — SIGNIFICANT CHANGE UP (ref 1.6–2.6)
MCHC RBC-ENTMCNC: 20.9 PG — LOW (ref 27–34)
MCHC RBC-ENTMCNC: 29.8 GM/DL — LOW (ref 32–36)
MCV RBC AUTO: 70 FL — LOW (ref 80–100)
NRBC # BLD: 0 /100 WBCS — SIGNIFICANT CHANGE UP (ref 0–0)
PHOSPHATE SERPL-MCNC: 4.6 MG/DL — HIGH (ref 2.5–4.5)
PLATELET # BLD AUTO: 526 K/UL — HIGH (ref 150–400)
POTASSIUM SERPL-MCNC: 4.2 MMOL/L — SIGNIFICANT CHANGE UP (ref 3.5–5.3)
POTASSIUM SERPL-SCNC: 4.2 MMOL/L — SIGNIFICANT CHANGE UP (ref 3.5–5.3)
RBC # BLD: 4.74 M/UL — SIGNIFICANT CHANGE UP (ref 4.2–5.8)
RBC # FLD: 18.5 % — HIGH (ref 10.3–14.5)
SODIUM SERPL-SCNC: 139 MMOL/L — SIGNIFICANT CHANGE UP (ref 135–145)
T PALLIDUM AB TITR SER: NEGATIVE — SIGNIFICANT CHANGE UP
WBC # BLD: 9.25 K/UL — SIGNIFICANT CHANGE UP (ref 3.8–10.5)
WBC # FLD AUTO: 9.25 K/UL — SIGNIFICANT CHANGE UP (ref 3.8–10.5)

## 2021-12-02 PROCEDURE — 99232 SBSQ HOSP IP/OBS MODERATE 35: CPT | Mod: GC

## 2021-12-02 PROCEDURE — 99233 SBSQ HOSP IP/OBS HIGH 50: CPT

## 2021-12-02 RX ORDER — FERROUS SULFATE 325(65) MG
325 TABLET ORAL DAILY
Refills: 0 | Status: DISCONTINUED | OUTPATIENT
Start: 2021-12-02 | End: 2021-12-10

## 2021-12-02 RX ORDER — OLANZAPINE 15 MG/1
5 TABLET, FILM COATED ORAL
Refills: 0 | Status: DISCONTINUED | OUTPATIENT
Start: 2021-12-02 | End: 2021-12-03

## 2021-12-02 RX ORDER — HALOPERIDOL DECANOATE 100 MG/ML
5 INJECTION INTRAMUSCULAR ONCE
Refills: 0 | Status: COMPLETED | OUTPATIENT
Start: 2021-12-02 | End: 2021-12-03

## 2021-12-02 RX ORDER — ASCORBIC ACID 60 MG
500 TABLET,CHEWABLE ORAL DAILY
Refills: 0 | Status: DISCONTINUED | OUTPATIENT
Start: 2021-12-02 | End: 2021-12-05

## 2021-12-02 RX ADMIN — Medication 1 TABLET(S): at 13:04

## 2021-12-02 RX ADMIN — Medication 105 MILLIGRAM(S): at 21:11

## 2021-12-02 RX ADMIN — OLANZAPINE 5 MILLIGRAM(S): 15 TABLET, FILM COATED ORAL at 18:29

## 2021-12-02 RX ADMIN — Medication 105 MILLIGRAM(S): at 13:03

## 2021-12-02 RX ADMIN — Medication 325 MILLIGRAM(S): at 16:29

## 2021-12-02 RX ADMIN — Medication 3 MILLIGRAM(S): at 21:11

## 2021-12-02 RX ADMIN — Medication 2 MILLIGRAM(S): at 18:32

## 2021-12-02 RX ADMIN — Medication 1 MILLIGRAM(S): at 13:04

## 2021-12-02 RX ADMIN — ENOXAPARIN SODIUM 40 MILLIGRAM(S): 100 INJECTION SUBCUTANEOUS at 13:04

## 2021-12-02 RX ADMIN — Medication 105 MILLIGRAM(S): at 06:16

## 2021-12-02 RX ADMIN — Medication 2 MILLIGRAM(S): at 16:30

## 2021-12-02 RX ADMIN — Medication 2 MILLIGRAM(S): at 21:10

## 2021-12-02 RX ADMIN — Medication 500 MILLIGRAM(S): at 16:30

## 2021-12-02 NOTE — PATIENT PROFILE ADULT - NSPROGENSOURCEINFO_GEN_A_NUR
PT confused. Does not cooperate/answer questions. States keo or answers that are not sensible./patient

## 2021-12-02 NOTE — PROGRESS NOTE ADULT - PROBLEM SELECTOR PLAN 3
Hgb 9s on admission  - Iron deficient, microcytic anemia >> 200mg IV Iron Hgb 9s on admission  - Iron deficient, microcytic anemia >> 200mg IV Iron  - Will alternate days of checking iron with giving 200mg IV iron until iron is normal Hgb 9s on admission  - Iron deficient, microcytic anemia >> 200mg IV Iron, repeat iron 12/2 normal  - Will alternate days of checking iron with giving 200mg IV iron until iron is normal

## 2021-12-02 NOTE — PROGRESS NOTE ADULT - SUBJECTIVE AND OBJECTIVE BOX
***************************************************************  Lisa Iraj, PGY2  Internal Medicine   pager: NS: [phone] [facility]: [phone]  ***************************************************************    PROGRESS NOTE:     Patient is a 51y old  Male who presents with a chief complaint of COVID and ETOH withdrawal ? (01 Dec 2021 14:50)      SUBJECTIVE / OVERNIGHT EVENTS:      MEDICATIONS  (STANDING):  enoxaparin Injectable 40 milliGRAM(s) SubCutaneous daily  folic acid 1 milliGRAM(s) Oral daily  multivitamin 1 Tablet(s) Oral daily  OLANZapine 5 milliGRAM(s) Oral at bedtime  thiamine IVPB 500 milliGRAM(s) IV Intermittent three times a day    MEDICATIONS  (PRN):  LORazepam     Tablet 2 milliGRAM(s) Oral every 2 hours PRN Threatening behavior  LORazepam     Tablet 2 milliGRAM(s) Oral every 1 hour PRN Symptom-triggered: each CIWA -Ar score 8 or GREATER  LORazepam   Injectable 2 milliGRAM(s) IV Push every 1 hour PRN Symptom-triggered: each CIWA -Ar score 8 or GREATER  melatonin 3 milliGRAM(s) Oral at bedtime PRN Insomnia  ondansetron Injectable 4 milliGRAM(s) IV Push every 8 hours PRN Nausea and/or Vomiting      CAPILLARY BLOOD GLUCOSE        I&O's Summary    2021 07:01  -  01 Dec 2021 07:00  --------------------------------------------------------  IN: 920 mL / OUT: 400 mL / NET: 520 mL    01 Dec 2021 07:01  -  02 Dec 2021 05:52  --------------------------------------------------------  IN: 1700 mL / OUT: 1500 mL / NET: 200 mL        PHYSICAL EXAM:  Vital Signs Last 24 Hrs  T(C): 36.8 (02 Dec 2021 04:30), Max: 36.8 (01 Dec 2021 19:30)  T(F): 98.2 (02 Dec 2021 04:30), Max: 98.2 (01 Dec 2021 19:30)  HR: 95 (02 Dec 2021 04:30) (84 - 102)  BP: 129/75 (02 Dec 2021 04:30) (124/80 - 154/99)  BP(mean): --  RR: 17 (02 Dec 2021 04:30) (15 - 19)  SpO2: 95% (02 Dec 2021 04:30) (93% - 98%)    CONSTITUTIONAL: NAD, well-developed  RESPIRATORY: Normal respiratory effort; lungs are clear to auscultation bilaterally  CARDIOVASCULAR: Regular rate and rhythm, normal S1 and S2, no murmur/rub/gallop; No lower extremity edema; Peripheral pulses are 2+ bilaterally  ABDOMEN: Nontender to palpation, normoactive bowel sounds, no rebound/guarding; No hepatosplenomegaly  MUSCLOSKELETAL: no clubbing or cyanosis of digits; no joint swelling or tenderness to palpation  NEURO: CN 2-12 grossly intact, moves all limbs spontaneously  PSYCH: A+O to person, place, and time; affect appropriate    LABS:                        9.9    9.25  )-----------( 526      ( 02 Dec 2021 05:00 )             33.2     12    139  |  105  |  12  ----------------------------<  111<H>  4.2   |  23  |  0.64    Ca    8.7      02 Dec 2021 05:00  Phos  4.6     12  Mg     1.9         TPro  6.8  /  Alb  3.7  /  TBili  0.2  /  DBili  x   /  AST  13  /  ALT  13  /  AlkPhos  78      PT/INR - ( 01 Dec 2021 06:50 )   PT: 12.8 sec;   INR: 1.07 ratio         PTT - ( 01 Dec 2021 06:50 )  PTT:32.1 sec  CARDIAC MARKERS ( 01 Dec 2021 06:57 )  x     / x     / 69 U/L / x     / x          Urinalysis Basic - ( 01 Dec 2021 15:30 )    Color: Light Yellow / Appearance: Clear / S.012 / pH: x  Gluc: x / Ketone: Negative  / Bili: Negative / Urobili: Negative   Blood: x / Protein: Negative / Nitrite: Negative   Leuk Esterase: Negative / RBC: 0 /hpf / WBC 0 /HPF   Sq Epi: x / Non Sq Epi: 0 /hpf / Bacteria: Negative          RADIOLOGY & ADDITIONAL TESTS:  Results Reviewed:   Imaging Personally Reviewed:  Electrocardiogram Personally Reviewed:    COORDINATION OF CARE:  Care Discussed with Consultants/Other Providers [Y/N]:  Prior or Outpatient Records Reviewed [Y/N]:   ***************************************************************  Lisa Iraj, PGY2  Internal Medicine   pager: NS: [phone] [facility]: [phone]  ***************************************************************    PROGRESS NOTE:     Patient is a 51y old  Male who presents with a chief complaint of COVID and ETOH withdrawal ? (01 Dec 2021 14:50)      SUBJECTIVE / OVERNIGHT EVENTS:  No acute events overnight. Patient is calmer this morning and even said "thank you" when told he would be left alone to sleep. Patient's nephew and PCP were identified and contact information placed in the provider handout.  recommended increasing zyprexa to 5mg BID. Neurology saw patient and had labs to rule out other causes of AMS. Iron is still low and will give another 200 mg IV iron tomorrow. Unable to obtain ROS but he denies SOB.         MEDICATIONS  (STANDING):  enoxaparin Injectable 40 milliGRAM(s) SubCutaneous daily  folic acid 1 milliGRAM(s) Oral daily  multivitamin 1 Tablet(s) Oral daily  OLANZapine 5 milliGRAM(s) Oral at bedtime  thiamine IVPB 500 milliGRAM(s) IV Intermittent three times a day    MEDICATIONS  (PRN):  LORazepam     Tablet 2 milliGRAM(s) Oral every 2 hours PRN Threatening behavior  LORazepam     Tablet 2 milliGRAM(s) Oral every 1 hour PRN Symptom-triggered: each CIWA -Ar score 8 or GREATER  LORazepam   Injectable 2 milliGRAM(s) IV Push every 1 hour PRN Symptom-triggered: each CIWA -Ar score 8 or GREATER  melatonin 3 milliGRAM(s) Oral at bedtime PRN Insomnia  ondansetron Injectable 4 milliGRAM(s) IV Push every 8 hours PRN Nausea and/or Vomiting      CAPILLARY BLOOD GLUCOSE        I&O's Summary    2021 07:01  -  01 Dec 2021 07:00  --------------------------------------------------------  IN: 920 mL / OUT: 400 mL / NET: 520 mL    01 Dec 2021 07:01  -  02 Dec 2021 05:52  --------------------------------------------------------  IN: 1700 mL / OUT: 1500 mL / NET: 200 mL        PHYSICAL EXAM:  Vital Signs Last 24 Hrs  T(C): 36.8 (02 Dec 2021 04:30), Max: 36.8 (01 Dec 2021 19:30)  T(F): 98.2 (02 Dec 2021 04:30), Max: 98.2 (01 Dec 2021 19:30)  HR: 95 (02 Dec 2021 04:30) (84 - 102)  BP: 129/75 (02 Dec 2021 04:30) (124/80 - 154/99)  BP(mean): --  RR: 17 (02 Dec 2021 04:30) (15 - 19)  SpO2: 95% (02 Dec 2021 04:30) (93% - 98%)    GENERAL: Disheveled, poor dentition; laying in bed eyes closed  HEAD:  Atraumatic, Normocephalic  EYES: EOMI, PERRLA, conjunctiva and sclera clear  NECK: Supple, good ROM  NERVOUS SYSTEM:  Alert & Oriented X1, Motor Strength 5/5 B/L upper and lower extremities  CHEST/LUNG: Clear to auscultation b/l  HEART: Regular rate and rhythm  ABDOMEN: Soft, Nontender, Nondistended; Bowel sounds present  EXTREMITIES:  No clubbing, cyanosis, or edema  PSYCH: A&Ox1; incoherent, flight of ideas/statements intermittently    LABS:                        9.9    9.25  )-----------( 526      ( 02 Dec 2021 05:00 )             33.2     12-02    139  |  105  |  12  ----------------------------<  111<H>  4.2   |  23  |  0.64    Ca    8.7      02 Dec 2021 05:00  Phos  4.6     12  Mg     1.9         TPro  6.8  /  Alb  3.7  /  TBili  0.2  /  DBili  x   /  AST  13  /  ALT  13  /  AlkPhos  78  12    PT/INR - ( 01 Dec 2021 06:50 )   PT: 12.8 sec;   INR: 1.07 ratio         PTT - ( 01 Dec 2021 06:50 )  PTT:32.1 sec  CARDIAC MARKERS ( 01 Dec 2021 06:57 )  x     / x     / 69 U/L / x     / x          Urinalysis Basic - ( 01 Dec 2021 15:30 )    Color: Light Yellow / Appearance: Clear / S.012 / pH: x  Gluc: x / Ketone: Negative  / Bili: Negative / Urobili: Negative   Blood: x / Protein: Negative / Nitrite: Negative   Leuk Esterase: Negative / RBC: 0 /hpf / WBC 0 /HPF   Sq Epi: x / Non Sq Epi: 0 /hpf / Bacteria: Negative          RADIOLOGY & ADDITIONAL TESTS:  Results Reviewed:   Imaging Personally Reviewed:  Electrocardiogram Personally Reviewed:    COORDINATION OF CARE:  Care Discussed with Consultants/Other Providers [Y/N]:  Prior or Outpatient Records Reviewed [Y/N]:   ***************************************************************  Lisa Iraj, PGY2  Internal Medicine   pager: NS: [phone] [facility]: [phone]  ***************************************************************    PROGRESS NOTE:     Patient is a 51y old  Male who presents with a chief complaint of COVID and ETOH withdrawal ? (01 Dec 2021 14:50)      SUBJECTIVE / OVERNIGHT EVENTS:  No acute events overnight. Patient is calmer this morning and even said "thank you" when told he would be left alone to sleep. Patient's nephew and PCP were identified and contact information placed in the provider handout.  recommended increasing zyprexa to 5mg BID. Neurology saw patient and had labs to rule out other causes of AMS. Iron is now normal s/p 200 mg IV iron yesterday. Unable to obtain ROS but he denies SOB.         MEDICATIONS  (STANDING):  enoxaparin Injectable 40 milliGRAM(s) SubCutaneous daily  folic acid 1 milliGRAM(s) Oral daily  multivitamin 1 Tablet(s) Oral daily  OLANZapine 5 milliGRAM(s) Oral at bedtime  thiamine IVPB 500 milliGRAM(s) IV Intermittent three times a day    MEDICATIONS  (PRN):  LORazepam     Tablet 2 milliGRAM(s) Oral every 2 hours PRN Threatening behavior  LORazepam     Tablet 2 milliGRAM(s) Oral every 1 hour PRN Symptom-triggered: each CIWA -Ar score 8 or GREATER  LORazepam   Injectable 2 milliGRAM(s) IV Push every 1 hour PRN Symptom-triggered: each CIWA -Ar score 8 or GREATER  melatonin 3 milliGRAM(s) Oral at bedtime PRN Insomnia  ondansetron Injectable 4 milliGRAM(s) IV Push every 8 hours PRN Nausea and/or Vomiting      CAPILLARY BLOOD GLUCOSE        I&O's Summary    2021 07:01  -  01 Dec 2021 07:00  --------------------------------------------------------  IN: 920 mL / OUT: 400 mL / NET: 520 mL    01 Dec 2021 07:01  -  02 Dec 2021 05:52  --------------------------------------------------------  IN: 1700 mL / OUT: 1500 mL / NET: 200 mL        PHYSICAL EXAM:  Vital Signs Last 24 Hrs  T(C): 36.8 (02 Dec 2021 04:30), Max: 36.8 (01 Dec 2021 19:30)  T(F): 98.2 (02 Dec 2021 04:30), Max: 98.2 (01 Dec 2021 19:30)  HR: 95 (02 Dec 2021 04:30) (84 - 102)  BP: 129/75 (02 Dec 2021 04:30) (124/80 - 154/99)  BP(mean): --  RR: 17 (02 Dec 2021 04:30) (15 - 19)  SpO2: 95% (02 Dec 2021 04:30) (93% - 98%)    GENERAL: Disheveled, poor dentition; laying in bed eyes closed  HEAD:  Atraumatic, Normocephalic  EYES: EOMI, PERRLA, conjunctiva and sclera clear  NECK: Supple, good ROM  NERVOUS SYSTEM:  Alert & Oriented X1, Motor Strength 5/5 B/L upper and lower extremities  CHEST/LUNG: Clear to auscultation b/l  HEART: Regular rate and rhythm  ABDOMEN: Soft, Nontender, Nondistended; Bowel sounds present  EXTREMITIES:  No clubbing, cyanosis, or edema  PSYCH: A&Ox1; incoherent, flight of ideas/statements intermittently    LABS:                        9.9    9.25  )-----------( 526      ( 02 Dec 2021 05:00 )             33.2     12-02    139  |  105  |  12  ----------------------------<  111<H>  4.2   |  23  |  0.64    Ca    8.7      02 Dec 2021 05:00  Phos  4.6     12  Mg     1.9         TPro  6.8  /  Alb  3.7  /  TBili  0.2  /  DBili  x   /  AST  13  /  ALT  13  /  AlkPhos  78  12    PT/INR - ( 01 Dec 2021 06:50 )   PT: 12.8 sec;   INR: 1.07 ratio         PTT - ( 01 Dec 2021 06:50 )  PTT:32.1 sec  CARDIAC MARKERS ( 01 Dec 2021 06:57 )  x     / x     / 69 U/L / x     / x          Urinalysis Basic - ( 01 Dec 2021 15:30 )    Color: Light Yellow / Appearance: Clear / S.012 / pH: x  Gluc: x / Ketone: Negative  / Bili: Negative / Urobili: Negative   Blood: x / Protein: Negative / Nitrite: Negative   Leuk Esterase: Negative / RBC: 0 /hpf / WBC 0 /HPF   Sq Epi: x / Non Sq Epi: 0 /hpf / Bacteria: Negative          RADIOLOGY & ADDITIONAL TESTS:  Results Reviewed:   Imaging Personally Reviewed:  Electrocardiogram Personally Reviewed:    COORDINATION OF CARE:  Care Discussed with Consultants/Other Providers [Y/N]:  Prior or Outpatient Records Reviewed [Y/N]:   ***************************************************************  Lisa Iraj, PGY2  Internal Medicine   pager: NS: [phone] [facility]: [phone]  ***************************************************************    PROGRESS NOTE:     Patient is a 51y old  Male who presents with a chief complaint of COVID and ETOH withdrawal ? (01 Dec 2021 14:50)      SUBJECTIVE / OVERNIGHT EVENTS:  No acute events overnight. CIWA 6. Patient is calmer this morning and even said "thank you" when told he would be left alone to sleep. Patient's nephew and PCP were identified and contact information placed in the provider handout.  recommended increasing zyprexa to 5mg BID. Neurology saw patient and had labs to rule out other causes of AMS. Iron is now normal s/p 200 mg IV iron yesterday. Unable to obtain ROS but he denies SOB.         MEDICATIONS  (STANDING):  enoxaparin Injectable 40 milliGRAM(s) SubCutaneous daily  folic acid 1 milliGRAM(s) Oral daily  multivitamin 1 Tablet(s) Oral daily  OLANZapine 5 milliGRAM(s) Oral at bedtime  thiamine IVPB 500 milliGRAM(s) IV Intermittent three times a day    MEDICATIONS  (PRN):  LORazepam     Tablet 2 milliGRAM(s) Oral every 2 hours PRN Threatening behavior  LORazepam     Tablet 2 milliGRAM(s) Oral every 1 hour PRN Symptom-triggered: each CIWA -Ar score 8 or GREATER  LORazepam   Injectable 2 milliGRAM(s) IV Push every 1 hour PRN Symptom-triggered: each CIWA -Ar score 8 or GREATER  melatonin 3 milliGRAM(s) Oral at bedtime PRN Insomnia  ondansetron Injectable 4 milliGRAM(s) IV Push every 8 hours PRN Nausea and/or Vomiting      CAPILLARY BLOOD GLUCOSE        I&O's Summary    2021 07:01  -  01 Dec 2021 07:00  --------------------------------------------------------  IN: 920 mL / OUT: 400 mL / NET: 520 mL    01 Dec 2021 07:01  -  02 Dec 2021 05:52  --------------------------------------------------------  IN: 1700 mL / OUT: 1500 mL / NET: 200 mL        PHYSICAL EXAM:  Vital Signs Last 24 Hrs  T(C): 36.8 (02 Dec 2021 04:30), Max: 36.8 (01 Dec 2021 19:30)  T(F): 98.2 (02 Dec 2021 04:30), Max: 98.2 (01 Dec 2021 19:30)  HR: 95 (02 Dec 2021 04:30) (84 - 102)  BP: 129/75 (02 Dec 2021 04:30) (124/80 - 154/99)  BP(mean): --  RR: 17 (02 Dec 2021 04:30) (15 - 19)  SpO2: 95% (02 Dec 2021 04:30) (93% - 98%)    GENERAL: Disheveled, poor dentition; laying in bed eyes closed  HEAD:  Atraumatic, Normocephalic  EYES: EOMI, PERRLA, conjunctiva and sclera clear  NECK: Supple, good ROM  NERVOUS SYSTEM:  Alert & Oriented X1, Motor Strength 5/5 B/L upper and lower extremities  CHEST/LUNG: Clear to auscultation b/l  HEART: Regular rate and rhythm  ABDOMEN: Soft, Nontender, Nondistended; Bowel sounds present  EXTREMITIES:  No clubbing, cyanosis, or edema  PSYCH: A&Ox1; incoherent, flight of ideas/statements intermittently    LABS:                        9.9    9.25  )-----------( 526      ( 02 Dec 2021 05:00 )             33.2     12-02    139  |  105  |  12  ----------------------------<  111<H>  4.2   |  23  |  0.64    Ca    8.7      02 Dec 2021 05:00  Phos  4.6     12-  Mg     1.9         TPro  6.8  /  Alb  3.7  /  TBili  0.2  /  DBili  x   /  AST  13  /  ALT  13  /  AlkPhos  78  12    PT/INR - ( 01 Dec 2021 06:50 )   PT: 12.8 sec;   INR: 1.07 ratio         PTT - ( 01 Dec 2021 06:50 )  PTT:32.1 sec  CARDIAC MARKERS ( 01 Dec 2021 06:57 )  x     / x     / 69 U/L / x     / x          Urinalysis Basic - ( 01 Dec 2021 15:30 )    Color: Light Yellow / Appearance: Clear / S.012 / pH: x  Gluc: x / Ketone: Negative  / Bili: Negative / Urobili: Negative   Blood: x / Protein: Negative / Nitrite: Negative   Leuk Esterase: Negative / RBC: 0 /hpf / WBC 0 /HPF   Sq Epi: x / Non Sq Epi: 0 /hpf / Bacteria: Negative          RADIOLOGY & ADDITIONAL TESTS:  Results Reviewed:   Imaging Personally Reviewed:  Electrocardiogram Personally Reviewed:    COORDINATION OF CARE:  Care Discussed with Consultants/Other Providers [Y/N]:  Prior or Outpatient Records Reviewed [Y/N]:

## 2021-12-02 NOTE — BH CONSULTATION LIAISON PROGRESS NOTE - NSBHFUPINTERVALHXFT_PSY_A_CORE
Pt seen for f/u, received Zyprexa last night, AOx2 today (self, place) but remains off on year/date.  States he is hearing CAH to kill himself and others (to kill writer), no specific plan, feels depressed but cannot identify specific stressors.  When asked about psychiatric medication history, states "crystal meth"- cannot distinguish street drugs from psychiatric meds.  States he used to see a psychiatrist, but no longer.  Becomes agitated and starts screaming.    Per staff pt has been verbally agitated, grabbed at some staff this morning, but able to be verbally redirected.    Attempted to reach pt's nephew- Felipe Severino [phone] - line with busy signal and unable to leave message.

## 2021-12-02 NOTE — PROGRESS NOTE ADULT - PROBLEM SELECTOR PLAN 1
BAL negative. Per patient, history of alcohol use disorder. (+) Benzo.  - Placed on CIWA protocol with symptom triggered Ativan  - started thiamine 500 mg IV TID x 3 days then go to thiamine 100 mg daily  - f/u TSH  - started folate 1 mg daily  - Social work consult  -  following:    = CIWA, thiamine/MVI/folic acid.  Thiamine 500mg IV tid x9 doses    = PRN: Ativan 2mg PO q2h PRN sx-triggered CIWA    =  for substance abuse referral resources.  OP psych f/u at [facility]: [phone]    = Check: TSH, B12, folate, RPR, UA, consider MRI head, EEG, HIV, neuro evaluation BAL negative. Per patient, history of alcohol use disorder. (+) Benzo.  - Placed on CIWA protocol with symptom triggered Ativan  - started thiamine 500 mg IV TID x 3 days then go to thiamine 100 mg daily  - TSH normal, HIV negative  - started folate 1 mg daily  - Social work consult  -  following:    = CIWA, thiamine/MVI/folic acid.  Thiamine 500mg IV tid x9 doses    = PRN: Ativan 2mg PO q2h PRN sx-triggered CIWA    =  for substance abuse referral resources.  OP psych f/u at [facility]: [phone]    = Check: TSH, B12, folate, RPR, UA, neuro evaluation  - Neuro consulted and recommended additional labs to r/o other causes of AMS, will follow up (B1, B2, etc.)

## 2021-12-02 NOTE — PROGRESS NOTE ADULT - ATTENDING COMMENTS
52 yo male (likely undomiciled) with PMHx polysubstance abuse and schizophrenia admitted with disorganized behavior placed on CIWA protocol for possible withdrawal however also may be due to underlying schizophrenia, incidentally found to be covid positive.  1. encephalopathy: appreciate psych consult.  Cannot r/o etoh withdrawal, though utox only showing benzos and etoh level negative.  CIWA remains 5-6, per d/w Dr Espino (psych) continue ciwa protocol, mvi/folate/thiamine.  Social work c/s. Tsh, b12, folate wnl.  UA negative, HIV negative.  RPR pending.  Appreciate neuro c/s.  No need for LP or other imaging at this time as collaterol information confirming known psychiatric disorders.  F/u vitamin levels.  2. etoh withdrawal: as above with ativan prn, mvi/folate/thiamine, social work c/s  3. schizophrenia: social work, psych consults.  Increase zyprexa per psych  4. COVID: not hypoxic, no cough/sob noted on exam.  Not candidate for treatment at this time.  CXR reviewed-no pneumonia.  If Oxygen falls <94% may be candidate for treatment.  5. iron deficient anemia: On iron supplementation.  Not able to consent for c-scope at this time.  Outpatient w/u.  6. ppx: lovenox due to +covid    Beeper: [phone] .

## 2021-12-02 NOTE — CHART NOTE - NSCHARTNOTEFT_GEN_A_CORE
Pt seen and examined at bedside with attending this morning. Please refer to attending attestation of neurology note 12/1/21.     No further inpatient neurological workup at this time. Neurology signing off at this time, please page [phone] for further questions or change in exam

## 2021-12-02 NOTE — PROGRESS NOTE ADULT - PROBLEM SELECTOR PLAN 2
Per patient, history of schizophrenia not on any medications.   - Haldol 2.5 - 5 mg IV Q6h PRN for agitation if QTc < 500  - SBIRT consult: apprec recs  -  following:    = Zyprexa Zydis 5mg qHS (monitor qtc <500ms on ekg)    = EKG last QTc 490ms; will repeat tomorrow    = Neurology consult to r/o organic causes of AMS given poor psych hx, poor historian    = EEG ordered  - Neurology consulted Per patient, history of schizophrenia not on any medications.   - Haldol 2.5 - 5 mg IV Q6h PRN for agitation if QTc < 500  - SBIRT consult: apprec recs  -  following:    = Zyprexa Zydis 5mg qHS (monitor qtc <500ms on ekg) increased to 5mg BID    = EKG last QTc 490ms; will repeat tomorrow    = Neurology consult to r/o organic causes of AMS given poor psych hx, poor historian  - Neurology consulted and provided additional labs to check

## 2021-12-02 NOTE — PATIENT PROFILE ADULT - FALL HARM RISK - HARM RISK INTERVENTIONS
Assistance with ambulation/Assistance OOB with selected safe patient handling equipment/Communicate Risk of Fall with Harm to all staff/Discuss with provider need for PT consult/Monitor for mental status changes/Monitor gait and stability/Move patient closer to nurses' station/Reinforce activity limits and safety measures with patient and family/Reorient to person, place and time as needed/Tailored Fall Risk Interventions/Toileting schedule using arm’s reach rule for commode and bathroom/Use of alarms - bed, chair and/or voice tab/Visual Cue: Yellow wristband and red socks/Bed in lowest position, wheels locked, appropriate side rails in place/Call bell, personal items and telephone in reach/Instruct patient to call for assistance before getting out of bed or chair/Non-slip footwear when patient is out of bed/[place] to call system/Physically safe environment - no spills, clutter or unnecessary equipment/Purposeful Proactive Rounding/Room/bathroom lighting operational, light cord in reach

## 2021-12-03 LAB
MAGNESIUM SERPL-MCNC: 1.8 MG/DL — SIGNIFICANT CHANGE UP (ref 1.6–2.6)
PHOSPHATE SERPL-MCNC: 3 MG/DL — SIGNIFICANT CHANGE UP (ref 2.5–4.5)

## 2021-12-03 PROCEDURE — 99232 SBSQ HOSP IP/OBS MODERATE 35: CPT | Mod: GC

## 2021-12-03 PROCEDURE — 99233 SBSQ HOSP IP/OBS HIGH 50: CPT

## 2021-12-03 RX ORDER — OLANZAPINE 15 MG/1
7.5 TABLET, FILM COATED ORAL AT BEDTIME
Refills: 0 | Status: DISCONTINUED | OUTPATIENT
Start: 2021-12-03 | End: 2021-12-05

## 2021-12-03 RX ORDER — THIAMINE MONONITRATE (VIT B1) 100 MG
500 TABLET ORAL ONCE
Refills: 0 | Status: COMPLETED | OUTPATIENT
Start: 2021-12-03 | End: 2021-12-03

## 2021-12-03 RX ORDER — THIAMINE MONONITRATE (VIT B1) 100 MG
100 TABLET ORAL DAILY
Refills: 0 | Status: DISCONTINUED | OUTPATIENT
Start: 2021-12-04 | End: 2021-12-10

## 2021-12-03 RX ORDER — OLANZAPINE 15 MG/1
5 TABLET, FILM COATED ORAL DAILY
Refills: 0 | Status: DISCONTINUED | OUTPATIENT
Start: 2021-12-03 | End: 2021-12-05

## 2021-12-03 RX ORDER — OLANZAPINE 15 MG/1
2.5 TABLET, FILM COATED ORAL EVERY 6 HOURS
Refills: 0 | Status: DISCONTINUED | OUTPATIENT
Start: 2021-12-03 | End: 2021-12-03

## 2021-12-03 RX ORDER — ACETAMINOPHEN 500 MG
650 TABLET ORAL ONCE
Refills: 0 | Status: COMPLETED | OUTPATIENT
Start: 2021-12-03 | End: 2021-12-03

## 2021-12-03 RX ORDER — HALOPERIDOL DECANOATE 100 MG/ML
5 INJECTION INTRAMUSCULAR EVERY 6 HOURS
Refills: 0 | Status: DISCONTINUED | OUTPATIENT
Start: 2021-12-03 | End: 2021-12-10

## 2021-12-03 RX ORDER — THIAMINE MONONITRATE (VIT B1) 100 MG
500 TABLET ORAL THREE TIMES A DAY
Refills: 0 | Status: DISCONTINUED | OUTPATIENT
Start: 2021-12-03 | End: 2021-12-03

## 2021-12-03 RX ORDER — DIPHENHYDRAMINE HCL 50 MG
50 CAPSULE ORAL EVERY 6 HOURS
Refills: 0 | Status: DISCONTINUED | OUTPATIENT
Start: 2021-12-03 | End: 2021-12-05

## 2021-12-03 RX ADMIN — HALOPERIDOL DECANOATE 5 MILLIGRAM(S): 100 INJECTION INTRAMUSCULAR at 17:20

## 2021-12-03 RX ADMIN — Medication 650 MILLIGRAM(S): at 17:19

## 2021-12-03 RX ADMIN — Medication 2 MILLIGRAM(S): at 17:20

## 2021-12-03 RX ADMIN — Medication 50 MILLIGRAM(S): at 17:19

## 2021-12-03 RX ADMIN — Medication 105 MILLIGRAM(S): at 05:52

## 2021-12-03 RX ADMIN — OLANZAPINE 5 MILLIGRAM(S): 15 TABLET, FILM COATED ORAL at 05:53

## 2021-12-03 RX ADMIN — HALOPERIDOL DECANOATE 5 MILLIGRAM(S): 100 INJECTION INTRAMUSCULAR at 15:10

## 2021-12-03 RX ADMIN — Medication 2 MILLIGRAM(S): at 12:37

## 2021-12-03 RX ADMIN — Medication 105 MILLIGRAM(S): at 22:39

## 2021-12-03 NOTE — PROGRESS NOTE ADULT - SUBJECTIVE AND OBJECTIVE BOX
PROGRESS NOTE:     Patient is a 51y old  Male who presents with a chief complaint of COVID and ETOH withdrawal ? (02 Dec 2021 05:52)      SUBJECTIVE / OVERNIGHT EVENTS:    ADDITIONAL REVIEW OF SYSTEMS:    MEDICATIONS  (STANDING):  ascorbic acid 500 milliGRAM(s) Oral daily  enoxaparin Injectable 40 milliGRAM(s) SubCutaneous daily  ferrous    sulfate 325 milliGRAM(s) Oral daily  folic acid 1 milliGRAM(s) Oral daily  multivitamin 1 Tablet(s) Oral daily  OLANZapine 5 milliGRAM(s) Oral two times a day  thiamine IVPB 500 milliGRAM(s) IV Intermittent three times a day    MEDICATIONS  (PRN):  haloperidol    Injectable 5 milliGRAM(s) IV Push once PRN severe agitation  LORazepam     Tablet 2 milliGRAM(s) Oral every 2 hours PRN Threatening behavior  LORazepam     Tablet 2 milliGRAM(s) Oral every 1 hour PRN Symptom-triggered: each CIWA -Ar score 8 or GREATER  LORazepam   Injectable 2 milliGRAM(s) IV Push every 1 hour PRN Symptom-triggered: each CIWA -Ar score 8 or GREATER  melatonin 3 milliGRAM(s) Oral at bedtime PRN Insomnia  ondansetron Injectable 4 milliGRAM(s) IV Push every 8 hours PRN Nausea and/or Vomiting      CAPILLARY BLOOD GLUCOSE        I&O's Summary    02 Dec 2021 07:01  -  03 Dec 2021 07:00  --------------------------------------------------------  IN: 200 mL / OUT: 0 mL / NET: 200 mL        PHYSICAL EXAM:  Vital Signs Last 24 Hrs  T(C): 37.6 (02 Dec 2021 20:28), Max: 37.6 (02 Dec 2021 20:28)  T(F): 99.7 (02 Dec 2021 20:28), Max: 99.7 (02 Dec 2021 20:28)  HR: 102 (02 Dec 2021 20:28) (99 - 102)  BP: 146/73 (02 Dec 2021 20:28) (126/77 - 146/73)  BP(mean): --  RR: 18 (02 Dec 2021 20:28) (18 - 18)  SpO2: 96% (02 Dec 2021 20:28) (95% - 96%)    CONSTITUTIONAL: NAD, well-developed  RESPIRATORY: Normal respiratory effort; lungs are clear to auscultation bilaterally  CARDIOVASCULAR: Regular rate and rhythm, normal S1 and S2, no murmur/rub/gallop; No lower extremity edema; Peripheral pulses are 2+ bilaterally  ABDOMEN: Nontender to palpation, normoactive bowel sounds, no rebound/guarding; No hepatosplenomegaly  MUSCULOSKELETAL: no clubbing or cyanosis of digits; no joint swelling or tenderness to palpation  PSYCH: A+O to person, place, and time; affect appropriate    LABS:                        9.9    9.25  )-----------( 526      ( 02 Dec 2021 05:00 )             33.2     12-    139  |  105  |  12  ----------------------------<  111<H>  4.2   |  23  |  0.64    Ca    8.7      02 Dec 2021 05:00  Phos  4.6     12  Mg     1.9     1202            Urinalysis Basic - ( 01 Dec 2021 15:30 )    Color: Light Yellow / Appearance: Clear / S.012 / pH: x  Gluc: x / Ketone: Negative  / Bili: Negative / Urobili: Negative   Blood: x / Protein: Negative / Nitrite: Negative   Leuk Esterase: Negative / RBC: 0 /hpf / WBC 0 /HPF   Sq Epi: x / Non Sq Epi: 0 /hpf / Bacteria: Negative          RADIOLOGY & ADDITIONAL TESTS:  Results Reviewed:   Imaging Personally Reviewed:  Electrocardiogram Personally Reviewed:    COORDINATION OF CARE:  Care Discussed with Consultants/Other Providers [Y/N]:  Prior or Outpatient Records Reviewed [Y/N]:      ******************************  Authored By: Jermaine Don MD PGY1  Internal Medicine  Pager: [phone]  MS Teams  ******************************   PROGRESS NOTE:     Patient is a 51y old  Male who presents with a chief complaint of COVID and ETOH withdrawal ? (02 Dec 2021 05:52)      SUBJECTIVE / OVERNIGHT EVENTS: Code grey overnight, pt agitated, combative, throwing cans at nursing, refusing vitals/blood draws. Haldol PRN was ordered by night team but was not given, pt calmed down - sleeping this morning. Pt refusing vitals, AM labs. Per nephew, pt has hx of schizophrenia; obtain more hx from PCP.    ADDITIONAL REVIEW OF SYSTEMS:    MEDICATIONS  (STANDING):  ascorbic acid 500 milliGRAM(s) Oral daily  enoxaparin Injectable 40 milliGRAM(s) SubCutaneous daily  ferrous    sulfate 325 milliGRAM(s) Oral daily  folic acid 1 milliGRAM(s) Oral daily  multivitamin 1 Tablet(s) Oral daily  OLANZapine 5 milliGRAM(s) Oral two times a day  thiamine IVPB 500 milliGRAM(s) IV Intermittent three times a day    MEDICATIONS  (PRN):  haloperidol    Injectable 5 milliGRAM(s) IV Push once PRN severe agitation  LORazepam     Tablet 2 milliGRAM(s) Oral every 2 hours PRN Threatening behavior  LORazepam     Tablet 2 milliGRAM(s) Oral every 1 hour PRN Symptom-triggered: each CIWA -Ar score 8 or GREATER  LORazepam   Injectable 2 milliGRAM(s) IV Push every 1 hour PRN Symptom-triggered: each CIWA -Ar score 8 or GREATER  melatonin 3 milliGRAM(s) Oral at bedtime PRN Insomnia  ondansetron Injectable 4 milliGRAM(s) IV Push every 8 hours PRN Nausea and/or Vomiting      CAPILLARY BLOOD GLUCOSE        I&O's Summary    02 Dec 2021 07:01  -  03 Dec 2021 07:00  --------------------------------------------------------  IN: 200 mL / OUT: 0 mL / NET: 200 mL        PHYSICAL EXAM:  Vital Signs Last 24 Hrs  T(C): 37.6 (02 Dec 2021 20:28), Max: 37.6 (02 Dec 2021 20:28)  T(F): 99.7 (02 Dec 2021 20:28), Max: 99.7 (02 Dec 2021 20:28)  HR: 102 (02 Dec 2021 20:28) (99 - 102)  BP: 146/73 (02 Dec 2021 20:28) (126/77 - 146/73)  BP(mean): --  RR: 18 (02 Dec 2021 20:28) (18 - 18)  SpO2: 96% (02 Dec 2021 20:28) (95% - 96%)    Patient combative, agitated - physical exam limited this morning.  GENERAL: Disheveled; sleeping  HEAD:  Atraumatic, Normocephalic  EYES: EOMI  NECK: Supple, good ROM  NERVOUS SYSTEM:  Alert & Oriented X1  CHEST/LUNG: Clear to auscultation b/l  HEART: Regular rate and rhythm  ABDOMEN: Bowel sounds present  EXTREMITIES:  No edema  PSYCH: A&Ox1; sleeping            LABS:                        9.9    9.25  )-----------( 526      ( 02 Dec 2021 05:00 )             33.2     12-02    139  |  105  |  12  ----------------------------<  111<H>  4.2   |  23  |  0.64    Ca    8.7      02 Dec 2021 05:00  Phos  4.6     12-02  Mg     1.9     12-02            Urinalysis Basic - ( 01 Dec 2021 15:30 )    Color: Light Yellow / Appearance: Clear / S.012 / pH: x  Gluc: x / Ketone: Negative  / Bili: Negative / Urobili: Negative   Blood: x / Protein: Negative / Nitrite: Negative   Leuk Esterase: Negative / RBC: 0 /hpf / WBC 0 /HPF   Sq Epi: x / Non Sq Epi: 0 /hpf / Bacteria: Negative          RADIOLOGY & ADDITIONAL TESTS:  Results Reviewed:   Imaging Personally Reviewed:  Electrocardiogram Personally Reviewed:    COORDINATION OF CARE:  Care Discussed with Consultants/Other Providers [Y/N]:  Prior or Outpatient Records Reviewed [Y/N]:      ******************************  Authored By: Jermaine Don MD PGY1  Internal Medicine  Pager: [phone]  MS Teams  ******************************

## 2021-12-03 NOTE — DISCHARGE NOTE PROVIDER - CARE PROVIDER_API CALL
[facility],   [address] [place], [place] [address]  Phone: [phone]  Fax: (   )    -  Follow Up Time:

## 2021-12-03 NOTE — BH CONSULTATION LIAISON PROGRESS NOTE - NSBHFUPINTERVALHXFT_PSY_A_CORE
Pt agitated overnight, combative to staff, throwing cans at nursing, this AM intermittently yelling loudly.  Pt states he is still hearing CAH to kill himself and others- again cannot elaborate what voices are saying.  States his mood "sucks!".  Per staff pt has continued yelling slurs at staff members.  Accepting Zyprexa.  2PC completed in chart.  States his legal first name is "Adam".    Attempted to reach family again; Message left for nephew requesting callback.

## 2021-12-03 NOTE — DISCHARGE NOTE PROVIDER - PROVIDER TOKENS
FREE:[LAST:[[facility]],PHONE:[phone]],FAX:[(   )    -],ADDRESS:[[phone] [place] [address]]]

## 2021-12-03 NOTE — PROGRESS NOTE ADULT - PROBLEM SELECTOR PLAN 1
BAL negative. Per patient, history of alcohol use disorder. (+) Benzo.  - Placed on CIWA protocol with symptom triggered Ativan  - started thiamine 500 mg IV TID x 3 days then go to thiamine 100 mg daily  - TSH normal, HIV negative  - started folate 1 mg daily  - Social work consult  -  following:    = CIWA, thiamine/MVI/folic acid.  Thiamine 500mg IV tid x9 doses    = PRN: Ativan 2mg PO q2h PRN sx-triggered CIWA    =  for substance abuse referral resources.  OP psych f/u at [facility]: [phone]    = Check: TSH, B12, folate, RPR, UA, neuro evaluation  - Neuro consulted and recommended additional labs to r/o other causes of AMS, will follow up (B1, B2, etc.) BAL negative. Per patient, history of alcohol use disorder. (+) Benzo.  - Placed on CIWA protocol with symptom triggered Ativan  - started thiamine 500 mg IV TID x 3 days then go to thiamine 100 mg daily  - TSH normal, HIV negative  - started folate 1 mg daily  - Social work consult  -  following:    = CIWA, thiamine/MVI/folic acid.  Thiamine 500mg IV tid x9 doses (last dose 12/3 evening)    = PRN: Ativan 2mg PO q2h PRN sx-triggered CIWA    =  for substance abuse referral resources.  OP psych f/u at [facility]: [phone]    = Check: TSH, B12, folate, RPR, UA, neuro evaluation    = Zyprexa 5mg BID PO; QTc 463ms 12/1  - Neuro consulted and recommended additional labs to r/o other causes of AMS, will follow up (B1, B2, etc.); signed off

## 2021-12-03 NOTE — PROGRESS NOTE ADULT - ATTENDING COMMENTS
50 yo male (likely undomiciled) with PMHx polysubstance abuse and schizophrenia admitted with disorganized behavior placed on CIWA protocol for possible withdrawal however also may be due to underlying schizophrenia, incidentally found to be covid positive.  1. encephalopathy: appreciate psych consult.  Cannot r/o etoh withdrawal, though utox only showing benzos and etoh level negative.  Per d/w Dr Espino (psych) continue ciwa protocol, mvi/folate/thiamine.  Social work c/s. Tsh, b12, folate wnl.  UA negative, HIV negative, RPR negative.  Appreciate neuro c/s.  No need for LP or other imaging at this time as collaterol information confirming known psychiatric disorders.  F/u vitamin levels.  Per pysch will need inpatient psych   2. etoh withdrawal: as above with ativan prn, mvi/folate/thiamine, social work c/s  3. schizophrenia: social work, psych consults.  Increase zyprexa per psych  4. COVID: not hypoxic, no cough/sob noted on exam.  Not candidate for treatment at this time.  CXR reviewed-no pneumonia.  If Oxygen falls <94% may be candidate for treatment.  5. iron deficient anemia: On iron supplementation with vitamin c.  Not able to consent for c-scope at this time.  Outpatient w/u.  6. ppx: lovenox due to +covid  7. dispo: inpatient psych.  Unclear when able to transfer due to + covid status  Beeper: [phone] .

## 2021-12-03 NOTE — DISCHARGE NOTE PROVIDER - HOSPITAL COURSE
52 y/o M with unclear PMH (EtOH abuse? schizophrenia?) BIBEMS for AMS. Pt eloped from [facility] ED just prior to current admission; EMS stated that they picked up pt on the street after a passerby called 911 since he was banging on the windows of a bus, he then eloped from [facility] ED, and they picked him up again on the street; pt was malodorous, disheveled, wearing hospital gowns, multiple abrasions to feet and one on forehead. EMS stated that [facility] ED disposed of all of pt's property since it was "soiled"; pt is not an accurate historian and had garbled speech, thought the year was "nineteen something", stated he "drinks a lot," and he was endorsing SI with no plan or intent. Per patient, his last drink of EtOH was the day prior.    Pt was afebrile, hemodynamically stable in the ED. Labs (+) benzo, COVID+, Hgb 9.1, BAL negative. CTH negative for bleeding or ischemic changes, + for severe R maxillary sinus thickening and mucosal thickening ethmoid air cells and R frontal sinus. Pt given 1L NS, folate, and started on high dose thiamine. Infectious workup besides COVID has been negative, and patient admitted to COVID unit (no pneumonia).    During admission, pt was monitored on CIWA protocol and was intermittently aggressive. Behavioral health was consulted, pt started on Zyprexa 5mg AM and 7.5mg PM standing, with PRN Haldol 5 IM, Ativan 2 IV, and Benadryl 50 PO q6h. Pt given high dose thiamine 500mg TID x3 days, and IV iron for iron deficiency. Pt was evaluated by neurology for organic causes of AMS, recommended continuing workup via  and identifying other reversible causes for AMS. 52 y/o M with unclear PMH (EtOH abuse? schizophrenia?) BIBEMS for AMS. Pt eloped from [facility] ED just prior to current admission; EMS stated that they picked up pt on the street after a passerby called 911 since he was banging on the windows of a bus, he then eloped from [facility] ED, and they picked him up again on the street; pt was malodorous, disheveled, wearing hospital gowns, multiple abrasions to feet and one on forehead. EMS stated that [facility] ED disposed of all of pt's property since it was "soiled"; pt is not an accurate historian and had garbled speech, thought the year was "nineteen something", stated he "drinks a lot," and he was endorsing SI with no plan or intent. Per patient, his last drink of EtOH was the day prior.    Pt was afebrile, hemodynamically stable in the ED. Labs (+) benzo, COVID+, Hgb 9.1, BAL negative. CTH negative for bleeding or ischemic changes, + for severe R maxillary sinus thickening and mucosal thickening ethmoid air cells and R frontal sinus. Pt given 1L NS, folate, and started on high dose thiamine. Infectious workup besides COVID has been negative, and patient admitted to COVID unit (no pneumonia).    During admission, pt was monitored on CIWA protocol and was intermittently aggressive. Behavioral health was consulted, pt started on Zyprexa 5mg AM and 7.5mg PM standing, with PRN Haldol 5 IM, Ativan 2 IV, and Benadryl 50 PO q6h. Pt given high dose thiamine 500mg TID x3 days, and IV iron for iron deficiency. Pt was evaluated by neurology for organic causes of AMS, recommended continuing workup via  and identifying other reversible causes for AMS.    On 12/10 [facility] 50 y/o M with unclear PMH (EtOH abuse? schizophrenia?) BIBEMS for AMS. Pt eloped from [facility] ED just prior to current admission; EMS stated that they picked up pt on the street after a passerby called 911 since he was banging on the windows of a bus, he then eloped from [facility] ED, and they picked him up again on the street; pt was malodorous, disheveled, wearing hospital gowns, multiple abrasions to feet and one on forehead. EMS stated that [facility] ED disposed of all of pt's property since it was "soiled"; pt is not an accurate historian and had garbled speech, thought the year was "nineteen something", stated he "drinks a lot," and he was endorsing SI with no plan or intent. Per patient, his last drink of EtOH was the day prior.    Pt was afebrile, hemodynamically stable in the ED. Labs (+) benzo, COVID+, Hgb 9.1, BAL negative. CTH negative for bleeding or ischemic changes, + for severe R maxillary sinus thickening and mucosal thickening ethmoid air cells and R frontal sinus. Pt given 1L NS, folate, and started on high dose thiamine. Infectious workup besides COVID has been negative, and patient admitted to COVID unit (no pneumonia).    During admission, pt was monitored on CIWA protocol and was intermittently aggressive. Behavioral health was consulted, pt started on Zyprexa 5mg AM and 7.5mg PM standing, with PRN Haldol 5 IM, Ativan 2 IV, and Benadryl 50 PO q6h. Pt given high dose thiamine 500mg TID x3 days, and IV iron for iron deficiency. Pt was evaluated by neurology for organic causes of AMS, recommended continuing workup via  and identifying other reversible causes for AMS.    On 12/10 transfer to [facility]

## 2021-12-03 NOTE — DISCHARGE NOTE PROVIDER - NSDCMRMEDTOKEN_GEN_ALL_CORE_FT
acetaminophen 325 mg oral tablet: 2 tab(s) orally every 6 hours, As needed, Temp greater or equal to 38C (100.4F), Mild Pain (1 - 3)  divalproex sodium 500 mg oral delayed release tablet: 1 tab(s) orally 2 times a day  ferrous sulfate 325 mg (65 mg elemental iron) oral tablet: 1 tab(s) orally once a day  thiamine 100 mg oral tablet: 1 tab(s) orally once a day   acetaminophen 325 mg oral tablet: 2 tab(s) orally every 6 hours, As needed, Temp greater or equal to 38C (100.4F), Mild Pain (1 - 3)  benztropine 1 mg oral tablet: 1 tab(s) orally 2 times a day  diphenhydrAMINE 50 mg/mL injectable solution: 50 milligram(s) intramuscular every 6 hours, As Needed  divalproex sodium 500 mg oral delayed release tablet: 1 tab(s) orally 2 times a day  ferrous sulfate 325 mg (65 mg elemental iron) oral tablet: 1 tab(s) orally once a day  haloperidol: 5 milligram(s) intramuscular every 6 hours, As Needed  melatonin 3 mg oral tablet: 1 tab(s) orally once a day (at bedtime), As needed, Insomnia  OLANZapine 10 mg oral tablet: 1 tab(s) orally once a day (at bedtime)  OLANZapine 5 mg oral tablet: 1 tab(s) orally once a day  thiamine 100 mg oral tablet: 1 tab(s) orally once a day

## 2021-12-03 NOTE — PROGRESS NOTE ADULT - PROBLEM SELECTOR PLAN 3
Hgb 9s on admission  - Iron deficient, microcytic anemia >> 200mg IV Iron, repeat iron 12/2 normal  - Will alternate days of checking iron with giving 200mg IV iron until iron is normal

## 2021-12-03 NOTE — PROGRESS NOTE ADULT - PROBLEM SELECTOR PLAN 5
Diet: regular  Dispo: pending BH eval  DVT ppx: none, ambulating Diet: regular  Dispo: pending BH eval  DVT ppx: none, ambulating    Nephew Felipe Severino [phone]  Dr. Momo [place]ley [place] - mother is alive [phone]

## 2021-12-03 NOTE — DISCHARGE NOTE PROVIDER - NSDCCPCAREPLAN_GEN_ALL_CORE_FT
PRINCIPAL DISCHARGE DIAGNOSIS  Diagnosis: AMS (altered mental status)  Assessment and Plan of Treatment: You were found to be acting different from your baseline mental status. This may have been either due to alcohol intoxication or due to your schizophrenia being uncontrolled by medications. You were monitored for alcohol withdrawal, and given behavioral medications. Our behavioral health team was consulted, and started you on several medications including Zyprexa (standing) as well as others to be used as needed to help with your agitation.

## 2021-12-03 NOTE — PROGRESS NOTE ADULT - ASSESSMENT
Patient is a 51 year old male (likely undomiciled) with PMHx alcohol abuse, schizophrenia? p/w AMS likely 2/2 alcohol use vs schizophrenia. Patient is a 51 year old male (likely undomiciled) with PMHx alcohol abuse, schizophrenia? p/w AMS likely 2/2 alcohol use vs schizophrenia. Frequent episodes of combative/agitated behavior, started on zyprexa 5mg BID + Haldol 5 PRN.

## 2021-12-04 LAB — CERULOPLASMIN SERPL-MCNC: 28 MG/DL — SIGNIFICANT CHANGE UP (ref 15–30)

## 2021-12-04 PROCEDURE — 99232 SBSQ HOSP IP/OBS MODERATE 35: CPT | Mod: GC

## 2021-12-04 PROCEDURE — 99232 SBSQ HOSP IP/OBS MODERATE 35: CPT

## 2021-12-04 RX ADMIN — ENOXAPARIN SODIUM 40 MILLIGRAM(S): 100 INJECTION SUBCUTANEOUS at 11:20

## 2021-12-04 RX ADMIN — Medication 1 TABLET(S): at 11:21

## 2021-12-04 RX ADMIN — Medication 2 MILLIGRAM(S): at 22:19

## 2021-12-04 RX ADMIN — OLANZAPINE 7.5 MILLIGRAM(S): 15 TABLET, FILM COATED ORAL at 04:33

## 2021-12-04 RX ADMIN — Medication 100 MILLIGRAM(S): at 11:49

## 2021-12-04 RX ADMIN — Medication 1 MILLIGRAM(S): at 11:21

## 2021-12-04 RX ADMIN — OLANZAPINE 5 MILLIGRAM(S): 15 TABLET, FILM COATED ORAL at 11:21

## 2021-12-04 RX ADMIN — Medication 2 MILLIGRAM(S): at 10:57

## 2021-12-04 RX ADMIN — Medication 500 MILLIGRAM(S): at 11:21

## 2021-12-04 RX ADMIN — HALOPERIDOL DECANOATE 5 MILLIGRAM(S): 100 INJECTION INTRAMUSCULAR at 04:33

## 2021-12-04 RX ADMIN — Medication 3 MILLIGRAM(S): at 22:18

## 2021-12-04 RX ADMIN — Medication 50 MILLIGRAM(S): at 22:22

## 2021-12-04 RX ADMIN — Medication 50 MILLIGRAM(S): at 04:32

## 2021-12-04 RX ADMIN — Medication 325 MILLIGRAM(S): at 11:21

## 2021-12-04 NOTE — PROGRESS NOTE ADULT - PROBLEM SELECTOR PLAN 3
Hgb 9s on admission    - Possibly iron deficient  - Received IV iron  - Continue with oral supplementation  - Outpatient follow up

## 2021-12-04 NOTE — PROGRESS NOTE ADULT - PROBLEM SELECTOR PLAN 1
BAL negative. Per patient, history of alcohol use disorder. (+) Benzo.  - Placed on CIWA protocol with symptom triggered Ativan  - started thiamine 500 mg IV TID x 3 days then go to thiamine 100 mg daily  - TSH normal, HIV negative  - started folate 1 mg daily  - Social work consult  -  following:    = CIWA, thiamine/MVI/folic acid.  Thiamine 500mg IV tid x9 doses (last dose 12/3 evening)    = PRN: Ativan 2mg PO q2h PRN sx-triggered CIWA    =  for substance abuse referral resources.  OP psych f/u at [facility]: [phone]    = Check: TSH, B12, folate, RPR, UA, neuro evaluation    = Zyprexa 5mg BID PO; QTc 463ms 12/1  - Neuro consulted and recommended additional labs to r/o other causes of AMS, will follow up (B1, B2, etc.); signed off

## 2021-12-04 NOTE — PROGRESS NOTE ADULT - ASSESSMENT
51 year old male (likely undomiciled) with PMHx alcohol abuse, schizophrenia? p/w AMS likely 2/2 alcohol use vs schizophrenia. Frequent episodes of combative/agitated behavior, started on zyprexa 5mg BID + Haldol 5 PRN. Suspect underlying psych issue. Also testing positive for COVID.

## 2021-12-04 NOTE — BH CONSULTATION LIAISON PROGRESS NOTE - NSBHFUPINTERVALHXFT_PSY_A_CORE
Pt agitated overnight, required multiple PRNs for haldol and ativan; like a "light switch per staff." Asleep, minimally arousable, unresponsive to questioning on exam.

## 2021-12-04 NOTE — PROGRESS NOTE ADULT - SUBJECTIVE AND OBJECTIVE BOX
Patient is a 51y old  Male who presents with a chief complaint of COVID and ETOH withdrawal ? (04 Dec 2021 06:52)      SUBJECTIVE / OVERNIGHT EVENTS: Patient agitated and angry. Smearing pudding over his gown. Expressing suicide and homicide ideation.    MEDICATIONS  (STANDING):  ascorbic acid 500 milliGRAM(s) Oral daily  enoxaparin Injectable 40 milliGRAM(s) SubCutaneous daily  ferrous    sulfate 325 milliGRAM(s) Oral daily  folic acid 1 milliGRAM(s) Oral daily  multivitamin 1 Tablet(s) Oral daily  OLANZapine 5 milliGRAM(s) Oral daily  OLANZapine 7.5 milliGRAM(s) Oral at bedtime  thiamine 100 milliGRAM(s) Oral daily    MEDICATIONS  (PRN):  diphenhydrAMINE 50 milliGRAM(s) Oral every 6 hours PRN Agitation  haloperidol    Injectable 5 milliGRAM(s) IntraMuscular every 6 hours PRN Agitation  LORazepam     Tablet 2 milliGRAM(s) Oral every 1 hour PRN Symptom-triggered: each CIWA -Ar score 8 or GREATER  LORazepam   Injectable 2 milliGRAM(s) IV Push every 1 hour PRN Symptom-triggered: each CIWA -Ar score 8 or GREATER  LORazepam   Injectable 2 milliGRAM(s) IV Push every 6 hours PRN Agitation  melatonin 3 milliGRAM(s) Oral at bedtime PRN Insomnia  ondansetron Injectable 4 milliGRAM(s) IV Push every 8 hours PRN Nausea and/or Vomiting      CAPILLARY BLOOD GLUCOSE        I&O's Summary    03 Dec 2021 07:01  -  04 Dec 2021 07:00  --------------------------------------------------------  IN: 750 mL / OUT: 1000 mL / NET: -250 mL    04 Dec 2021 07:01  -  04 Dec 2021 15:27  --------------------------------------------------------  IN: 0 mL / OUT: 1100 mL / NET: -1100 mL        PHYSICAL EXAM:  Vital Signs Last 24 Hrs  T(C): 36.3 (04 Dec 2021 10:15), Max: 36.3 (04 Dec 2021 10:15)  T(F): 97.3 (04 Dec 2021 10:15), Max: 97.3 (04 Dec 2021 10:15)  HR: 90 (04 Dec 2021 10:15) (90 - 90)  BP: 147/100 (04 Dec 2021 10:15) (147/100 - 147/100)  BP(mean): --  RR: 18 (04 Dec 2021 10:15) (18 - 18)  SpO2: 96% (04 Dec 2021 10:15) (96% - 96%)    LABS:      Phos  3.0     12-03  Mg     1.8     12-03                  RADIOLOGY & ADDITIONAL TESTS:  Results Reviewed:   Imaging Personally Reviewed:  Electrocardiogram Personally Reviewed:    COORDINATION OF CARE:  Care Discussed with Consultants/Other Providers [Y/N]:  Prior or Outpatient Records Reviewed [Y/N]:

## 2021-12-04 NOTE — PROGRESS NOTE ADULT - PROBLEM SELECTOR PLAN 5
Diet: regular  Dispo: 2PC psych  DVT ppx: none, ambulating (though should be on Lovenox, will defer given agitation)    Nephew Felipe Severino [phone]  Dr. Momo Buck Puma [place] - mother is alive [phone]    Patient presentation likely 2/2 to psych issue. No other cause identified and unsure if COVID is playing a role. No COVID-19 directed treatments. Need to determine how to get patient inpatient psych given COVID-19 positivity

## 2021-12-04 NOTE — PROGRESS NOTE ADULT - PROBLEM SELECTOR PLAN 2
Per patient, history of schizophrenia not on any medications.   - Haldol 2.5 - 5 mg IV Q6h PRN for agitation if QTc < 500  - SBIRT consult: apprec recs  -  following:    = Zyprexa 5 mg AM and 7.5 mg PM    = Neurology consult to r/o organic causes >> signed off; additional labs for reversible causes are being followed up

## 2021-12-05 PROCEDURE — 99233 SBSQ HOSP IP/OBS HIGH 50: CPT

## 2021-12-05 PROCEDURE — 99232 SBSQ HOSP IP/OBS MODERATE 35: CPT

## 2021-12-05 RX ORDER — BENZTROPINE MESYLATE 1 MG
1 TABLET ORAL
Refills: 0 | Status: DISCONTINUED | OUTPATIENT
Start: 2021-12-05 | End: 2021-12-10

## 2021-12-05 RX ORDER — OLANZAPINE 15 MG/1
10 TABLET, FILM COATED ORAL AT BEDTIME
Refills: 0 | Status: DISCONTINUED | OUTPATIENT
Start: 2021-12-05 | End: 2021-12-10

## 2021-12-05 RX ORDER — OLANZAPINE 15 MG/1
5 TABLET, FILM COATED ORAL DAILY
Refills: 0 | Status: DISCONTINUED | OUTPATIENT
Start: 2021-12-05 | End: 2021-12-10

## 2021-12-05 RX ORDER — DIPHENHYDRAMINE HCL 50 MG
50 CAPSULE ORAL EVERY 6 HOURS
Refills: 0 | Status: DISCONTINUED | OUTPATIENT
Start: 2021-12-05 | End: 2021-12-10

## 2021-12-05 RX ADMIN — OLANZAPINE 5 MILLIGRAM(S): 15 TABLET, FILM COATED ORAL at 16:04

## 2021-12-05 RX ADMIN — Medication 1 TABLET(S): at 11:23

## 2021-12-05 RX ADMIN — Medication 1 MILLIGRAM(S): at 21:47

## 2021-12-05 RX ADMIN — OLANZAPINE 10 MILLIGRAM(S): 15 TABLET, FILM COATED ORAL at 21:47

## 2021-12-05 RX ADMIN — HALOPERIDOL DECANOATE 5 MILLIGRAM(S): 100 INJECTION INTRAMUSCULAR at 14:03

## 2021-12-05 RX ADMIN — Medication 100 MILLIGRAM(S): at 12:21

## 2021-12-05 RX ADMIN — Medication 500 MILLIGRAM(S): at 11:23

## 2021-12-05 RX ADMIN — Medication 1 MILLIGRAM(S): at 11:24

## 2021-12-05 RX ADMIN — Medication 2 MILLIGRAM(S): at 11:23

## 2021-12-05 RX ADMIN — OLANZAPINE 7.5 MILLIGRAM(S): 15 TABLET, FILM COATED ORAL at 04:51

## 2021-12-05 RX ADMIN — Medication 325 MILLIGRAM(S): at 11:23

## 2021-12-05 NOTE — PROGRESS NOTE ADULT - PROBLEM SELECTOR PLAN 1
BAL negative. Per patient, history of alcohol use disorder. (+) Benzo.  - Placed on CIWA protocol, but now out of the window so discontinued  - continue with thiamine and FA

## 2021-12-05 NOTE — BH CONSULTATION LIAISON PROGRESS NOTE - NSBHFUPINTERVALHXFT_PSY_A_CORE
Pt agitated overnight and this AM, multiple PRNs given, restless and irritable, growling at writer and yelling/cursing.  States he continues to hear CAH to kill himself, begins choking himself with his hands and yelling, also reports CAH to "kill EVERYONE."  Per staff has been agitated, combative, requiring PRNs.  Pending 2PC to inpt psych.

## 2021-12-05 NOTE — PROGRESS NOTE ADULT - PROBLEM SELECTOR PLAN 5
Diet: regular  Dispo: 2PC psych  DVT ppx: none, ambulating (though should be on Lovenox, will defer given agitation)    Nephew Felipe Severino [phone]  Dr. Momo Buck [place] - mother is alive [phone]    Patient presentation likely 2/2 to psych issue. No other cause identified and unsure if COVID is playing a role. No COVID-19 directed treatments. Need to determine how to get patient inpatient psych given COVID-19 positivity

## 2021-12-05 NOTE — PROVIDER CONTACT NOTE (OTHER) - ACTION/TREATMENT ORDERED:
MD Beck made aware of Pt.'s behavior. MD @ bedside to assess Pt. Pt. still not cooperative with assessments. 1:1 observation continued for safety.

## 2021-12-05 NOTE — PROGRESS NOTE ADULT - SUBJECTIVE AND OBJECTIVE BOX
Patient is a 51y old  Male who presents with a chief complaint of COVID and ETOH withdrawal ? (05 Dec 2021 04:13)      SUBJECTIVE / OVERNIGHT EVENTS: Patient seen and examined at bedside. No acute events overnight. Currently mumbling to himself and not able to articulate any complaints.    MEDICATIONS  (STANDING):  ascorbic acid 500 milliGRAM(s) Oral daily  enoxaparin Injectable 40 milliGRAM(s) SubCutaneous daily  ferrous    sulfate 325 milliGRAM(s) Oral daily  folic acid 1 milliGRAM(s) Oral daily  multivitamin 1 Tablet(s) Oral daily  OLANZapine 5 milliGRAM(s) Oral daily  OLANZapine 7.5 milliGRAM(s) Oral at bedtime  thiamine 100 milliGRAM(s) Oral daily    MEDICATIONS  (PRN):  diphenhydrAMINE 50 milliGRAM(s) Oral every 6 hours PRN Agitation  haloperidol    Injectable 5 milliGRAM(s) IntraMuscular every 6 hours PRN Agitation  LORazepam   Injectable 2 milliGRAM(s) IV Push every 6 hours PRN Agitation  melatonin 3 milliGRAM(s) Oral at bedtime PRN Insomnia      CAPILLARY BLOOD GLUCOSE        I&O's Summary    04 Dec 2021 07:01  -  05 Dec 2021 07:00  --------------------------------------------------------  IN: 880 mL / OUT: 1600 mL / NET: -720 mL        PHYSICAL EXAM:  Vital Signs Last 24 Hrs  T(C): 36.1 (04 Dec 2021 22:38), Max: 36.1 (04 Dec 2021 22:38)  T(F): 97 (04 Dec 2021 22:38), Max: 97 (04 Dec 2021 22:38)  HR: 91 (04 Dec 2021 22:38) (91 - 91)  BP: 144/84 (04 Dec 2021 22:38) (144/84 - 144/84)  BP(mean): --  RR: 17 (04 Dec 2021 22:38) (17 - 17)  SpO2: 100% (04 Dec 2021 22:38) (100% - 100%)      LABS:      Phos  3.0     12-03  Mg     1.8     12-03                  RADIOLOGY & ADDITIONAL TESTS:  Results Reviewed:   Imaging Personally Reviewed:  Electrocardiogram Personally Reviewed:    COORDINATION OF CARE:  Care Discussed with Consultants/Other Providers [Y/N]:  Prior or Outpatient Records Reviewed [Y/N]:

## 2021-12-05 NOTE — PROGRESS NOTE ADULT - PROBLEM SELECTOR PLAN 2
Per patient, history of schizophrenia not on any medications.     - Haldol PRN  - Zyprexa 5 mg AM and 7.5 mg PM standing  - Ativan & benadryl PRN agitation  - Psych following: need 2PC

## 2021-12-06 PROCEDURE — 99233 SBSQ HOSP IP/OBS HIGH 50: CPT

## 2021-12-06 PROCEDURE — 99232 SBSQ HOSP IP/OBS MODERATE 35: CPT

## 2021-12-06 RX ORDER — DIVALPROEX SODIUM 500 MG/1
500 TABLET, DELAYED RELEASE ORAL
Refills: 0 | Status: DISCONTINUED | OUTPATIENT
Start: 2021-12-06 | End: 2021-12-10

## 2021-12-06 RX ADMIN — DIVALPROEX SODIUM 500 MILLIGRAM(S): 500 TABLET, DELAYED RELEASE ORAL at 15:57

## 2021-12-06 RX ADMIN — Medication 325 MILLIGRAM(S): at 10:41

## 2021-12-06 RX ADMIN — OLANZAPINE 10 MILLIGRAM(S): 15 TABLET, FILM COATED ORAL at 21:17

## 2021-12-06 RX ADMIN — Medication 100 MILLIGRAM(S): at 10:41

## 2021-12-06 RX ADMIN — Medication 1 MILLIGRAM(S): at 17:22

## 2021-12-06 RX ADMIN — OLANZAPINE 5 MILLIGRAM(S): 15 TABLET, FILM COATED ORAL at 10:41

## 2021-12-06 RX ADMIN — Medication 1 MILLIGRAM(S): at 06:47

## 2021-12-06 NOTE — PROGRESS NOTE ADULT - NSPROGADDITIONALINFOA_GEN_ALL_CORE
Alcides Beck  Pager: [phone]  Off-Hours: [phone]  Available on MS Teams
Alcides Beck  Pager: [phone]  Off-Hours: [phone]  Available on MS Teams
Case d/w LINDA Williamson

## 2021-12-06 NOTE — PROGRESS NOTE ADULT - SUBJECTIVE AND OBJECTIVE BOX
Patient is a 51y old  Male who presents with a chief complaint of COVID and ETOH withdrawal ? (06 Dec 2021 10:54)      SUBJECTIVE / OVERNIGHT EVENTS: Pt seen. Refusing physical exam, yelling profanities.    MEDICATIONS  (STANDING):  benztropine 1 milliGRAM(s) Oral two times a day  enoxaparin Injectable 40 milliGRAM(s) SubCutaneous daily  ferrous    sulfate 325 milliGRAM(s) Oral daily  OLANZapine 5 milliGRAM(s) Oral daily  OLANZapine 10 milliGRAM(s) Oral at bedtime  thiamine 100 milliGRAM(s) Oral daily    MEDICATIONS  (PRN):  diphenhydrAMINE Injectable 50 milliGRAM(s) IntraMuscular every 6 hours PRN Agitation or EPS  haloperidol    Injectable 5 milliGRAM(s) IntraMuscular every 6 hours PRN Agitation  LORazepam   Injectable 2 milliGRAM(s) IV Push every 6 hours PRN Agitation  melatonin 3 milliGRAM(s) Oral at bedtime PRN Insomnia      Vital Signs Last 24 Hrs  T(C): 36.6 (06 Dec 2021 06:49), Max: 37.2 (05 Dec 2021 20:06)  T(F): 97.9 (06 Dec 2021 06:49), Max: 99 (05 Dec 2021 20:06)  HR: 93 (06 Dec 2021 06:49) (93 - 100)  BP: 139/92 (06 Dec 2021 06:49) (139/92 - 158/95)  BP(mean): --  RR: 18 (06 Dec 2021 06:49) (17 - 18)  SpO2: 97% (06 Dec 2021 06:49) (97% - 97%)  CAPILLARY BLOOD GLUCOSE        I&O's Summary    05 Dec 2021 07:01  -  06 Dec 2021 07:00  --------------------------------------------------------  IN: 800 mL / OUT: 1075 mL / NET: -275 mL    06 Dec 2021 07:01  -  06 Dec 2021 12:29  --------------------------------------------------------  IN: 240 mL / OUT: 0 mL / NET: 240 mL        PHYSICAL EXAM: unable to perform as pt is agitated                     Consultant(s) Notes Reviewed:  Psychiatry
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
## 2021-12-06 NOTE — PROGRESS NOTE ADULT - PROBLEM SELECTOR PLAN 2
Per patient, history of schizophrenia not on any medications.     - Haldol PRN  - Zyprexa 5 mg AM and 7.5 mg PM standing  - Ativan & benadryl PRN agitation  - Psych following: will need 2PC when inpatient Psych bed becomes available

## 2021-12-06 NOTE — PROGRESS NOTE ADULT - PROBLEM SELECTOR PLAN 5
Diet: regular  Dispo: 2PC when inpatient Psych bed becomes available  DVT ppx: none, ambulating (though should be on Lovenox ; c/t  defer given agitation)    Nephew Felipe Severino [phone]  Dr. Momo [place] - mother is alive [phone]

## 2021-12-07 LAB
ARSENIC SERPL-MCNC: 2 UG/L — SIGNIFICANT CHANGE UP (ref 2–23)
CADMIUM SERPL-MCNC: 0.8 UG/L — SIGNIFICANT CHANGE UP (ref 0–1.2)
COPPER SERPL-MCNC: 126 UG/DL — SIGNIFICANT CHANGE UP (ref 69–132)
LEAD BLD-MCNC: 1 UG/DL — SIGNIFICANT CHANGE UP (ref 0–4)
MERCURY SERPL-MCNC: <1 UG/L — SIGNIFICANT CHANGE UP (ref 0–14.9)

## 2021-12-07 PROCEDURE — 99233 SBSQ HOSP IP/OBS HIGH 50: CPT

## 2021-12-07 RX ORDER — ACETAMINOPHEN 500 MG
650 TABLET ORAL EVERY 6 HOURS
Refills: 0 | Status: DISCONTINUED | OUTPATIENT
Start: 2021-12-07 | End: 2021-12-10

## 2021-12-07 RX ADMIN — OLANZAPINE 10 MILLIGRAM(S): 15 TABLET, FILM COATED ORAL at 23:24

## 2021-12-07 RX ADMIN — Medication 1 MILLIGRAM(S): at 17:54

## 2021-12-07 RX ADMIN — Medication 650 MILLIGRAM(S): at 14:51

## 2021-12-07 RX ADMIN — DIVALPROEX SODIUM 500 MILLIGRAM(S): 500 TABLET, DELAYED RELEASE ORAL at 17:54

## 2021-12-07 RX ADMIN — Medication 650 MILLIGRAM(S): at 15:19

## 2021-12-07 RX ADMIN — OLANZAPINE 5 MILLIGRAM(S): 15 TABLET, FILM COATED ORAL at 11:44

## 2021-12-07 RX ADMIN — Medication 325 MILLIGRAM(S): at 11:44

## 2021-12-07 RX ADMIN — Medication 100 MILLIGRAM(S): at 11:44

## 2021-12-07 RX ADMIN — Medication 1 MILLIGRAM(S): at 05:12

## 2021-12-07 RX ADMIN — DIVALPROEX SODIUM 500 MILLIGRAM(S): 500 TABLET, DELAYED RELEASE ORAL at 05:11

## 2021-12-07 NOTE — PROGRESS NOTE ADULT - SUBJECTIVE AND OBJECTIVE BOX
Patient is a 51y old  Male who presents with a chief complaint of COVID and ETOH withdrawal ? (06 Dec 2021 10:54)      SUBJECTIVE / OVERNIGHT EVENTS: pt agressive, combative     MEDICATIONS  (STANDING):  benztropine 1 milliGRAM(s) Oral two times a day  diVALproex  milliGRAM(s) Oral two times a day  enoxaparin Injectable 40 milliGRAM(s) SubCutaneous daily  ferrous    sulfate 325 milliGRAM(s) Oral daily  OLANZapine 5 milliGRAM(s) Oral daily  OLANZapine 10 milliGRAM(s) Oral at bedtime  thiamine 100 milliGRAM(s) Oral daily    MEDICATIONS  (PRN):  diphenhydrAMINE Injectable 50 milliGRAM(s) IntraMuscular every 6 hours PRN Agitation or EPS  haloperidol    Injectable 5 milliGRAM(s) IntraMuscular every 6 hours PRN Agitation  LORazepam   Injectable 2 milliGRAM(s) IV Push every 6 hours PRN Agitation  melatonin 3 milliGRAM(s) Oral at bedtime PRN Insomnia        CAPILLARY BLOOD GLUCOSE        I&O's Summary    06 Dec 2021 07:01  -  07 Dec 2021 07:00  --------------------------------------------------------  IN: 1120 mL / OUT: 600 mL / NET: 520 mL        PHYSICAL EXAM:  Refusing PE  LABS:                    RADIOLOGY & ADDITIONAL TESTS:    Imaging Personally Reviewed:    Consultant(s) Notes Reviewed:      Care Discussed with Consultants/Other Providers:

## 2021-12-07 NOTE — PROGRESS NOTE ADULT - PROBLEM SELECTOR PLAN 5
Diet: regular  Dispo: 2PC when inpatient Psych bed becomes available  DVT ppx: none, ambulating (though should be on Lovenox ; c/t  defer given agitation)    Nephew Felipe Severino [phone]  Dr. Momo [place] - mother is alive [phone]

## 2021-12-07 NOTE — PROGRESS NOTE ADULT - PROBLEM SELECTOR PLAN 2
Per patient, history of schizophrenia not on any medications.   -Depakote 500mg bid   - Haldol PRN  - Zyprexa 5 mg AM and 10 mg PM standing  - Ativan & benadryl PRN agitation  - Psych following: will need 2PC when inpatient Psych bed becomes available

## 2021-12-07 NOTE — PROGRESS NOTE ADULT - PROBLEM SELECTOR PLAN 3
Hgb 9s on admission  - Received IV iron  - Continue with oral supplementation  - Outpatient follow up

## 2021-12-08 LAB
ALBUMIN SERPL ELPH-MCNC: 3.7 G/DL — SIGNIFICANT CHANGE UP (ref 3.3–5)
ALP SERPL-CCNC: 78 U/L — SIGNIFICANT CHANGE UP (ref 40–120)
ALT FLD-CCNC: 18 U/L — SIGNIFICANT CHANGE UP (ref 10–45)
ANION GAP SERPL CALC-SCNC: 12 MMOL/L — SIGNIFICANT CHANGE UP (ref 5–17)
AST SERPL-CCNC: 20 U/L — SIGNIFICANT CHANGE UP (ref 10–40)
BASOPHILS # BLD AUTO: 0.04 K/UL — SIGNIFICANT CHANGE UP (ref 0–0.2)
BASOPHILS NFR BLD AUTO: 0.4 % — SIGNIFICANT CHANGE UP (ref 0–2)
BILIRUB SERPL-MCNC: 0.3 MG/DL — SIGNIFICANT CHANGE UP (ref 0.2–1.2)
BUN SERPL-MCNC: 12 MG/DL — SIGNIFICANT CHANGE UP (ref 7–23)
CALCIUM SERPL-MCNC: 8.8 MG/DL — SIGNIFICANT CHANGE UP (ref 8.4–10.5)
CHLORIDE SERPL-SCNC: 106 MMOL/L — SIGNIFICANT CHANGE UP (ref 96–108)
CO2 SERPL-SCNC: 23 MMOL/L — SIGNIFICANT CHANGE UP (ref 22–31)
CREAT SERPL-MCNC: 0.5 MG/DL — SIGNIFICANT CHANGE UP (ref 0.5–1.3)
EOSINOPHIL # BLD AUTO: 0.16 K/UL — SIGNIFICANT CHANGE UP (ref 0–0.5)
EOSINOPHIL NFR BLD AUTO: 1.8 % — SIGNIFICANT CHANGE UP (ref 0–6)
GLUCOSE SERPL-MCNC: 80 MG/DL — SIGNIFICANT CHANGE UP (ref 70–99)
HCT VFR BLD CALC: 34.6 % — LOW (ref 39–50)
HGB BLD-MCNC: 10.1 G/DL — LOW (ref 13–17)
IMM GRANULOCYTES NFR BLD AUTO: 0.7 % — SIGNIFICANT CHANGE UP (ref 0–1.5)
LYMPHOCYTES # BLD AUTO: 2.58 K/UL — SIGNIFICANT CHANGE UP (ref 1–3.3)
LYMPHOCYTES # BLD AUTO: 28.5 % — SIGNIFICANT CHANGE UP (ref 13–44)
MAGNESIUM SERPL-MCNC: 2 MG/DL — SIGNIFICANT CHANGE UP (ref 1.6–2.6)
MCHC RBC-ENTMCNC: 20.8 PG — LOW (ref 27–34)
MCHC RBC-ENTMCNC: 29.2 GM/DL — LOW (ref 32–36)
MCV RBC AUTO: 71.2 FL — LOW (ref 80–100)
MONOCYTES # BLD AUTO: 1.38 K/UL — HIGH (ref 0–0.9)
MONOCYTES NFR BLD AUTO: 15.3 % — HIGH (ref 2–14)
NEUTROPHILS # BLD AUTO: 4.82 K/UL — SIGNIFICANT CHANGE UP (ref 1.8–7.4)
NEUTROPHILS NFR BLD AUTO: 53.3 % — SIGNIFICANT CHANGE UP (ref 43–77)
NRBC # BLD: 0 /100 WBCS — SIGNIFICANT CHANGE UP (ref 0–0)
PLATELET # BLD AUTO: SIGNIFICANT CHANGE UP K/UL (ref 150–400)
POTASSIUM SERPL-MCNC: 4.4 MMOL/L — SIGNIFICANT CHANGE UP (ref 3.5–5.3)
POTASSIUM SERPL-SCNC: 4.4 MMOL/L — SIGNIFICANT CHANGE UP (ref 3.5–5.3)
PROT SERPL-MCNC: 7 G/DL — SIGNIFICANT CHANGE UP (ref 6–8.3)
RBC # BLD: 4.86 M/UL — SIGNIFICANT CHANGE UP (ref 4.2–5.8)
RBC # FLD: 19.9 % — HIGH (ref 10.3–14.5)
SODIUM SERPL-SCNC: 141 MMOL/L — SIGNIFICANT CHANGE UP (ref 135–145)
WBC # BLD: 9.04 K/UL — SIGNIFICANT CHANGE UP (ref 3.8–10.5)
WBC # FLD AUTO: 9.04 K/UL — SIGNIFICANT CHANGE UP (ref 3.8–10.5)

## 2021-12-08 PROCEDURE — 99232 SBSQ HOSP IP/OBS MODERATE 35: CPT

## 2021-12-08 PROCEDURE — 99233 SBSQ HOSP IP/OBS HIGH 50: CPT

## 2021-12-08 RX ADMIN — Medication 100 MILLIGRAM(S): at 11:59

## 2021-12-08 RX ADMIN — Medication 1 MILLIGRAM(S): at 18:13

## 2021-12-08 RX ADMIN — ENOXAPARIN SODIUM 40 MILLIGRAM(S): 100 INJECTION SUBCUTANEOUS at 11:59

## 2021-12-08 RX ADMIN — Medication 325 MILLIGRAM(S): at 11:59

## 2021-12-08 RX ADMIN — DIVALPROEX SODIUM 500 MILLIGRAM(S): 500 TABLET, DELAYED RELEASE ORAL at 18:13

## 2021-12-08 RX ADMIN — Medication 650 MILLIGRAM(S): at 17:39

## 2021-12-08 RX ADMIN — OLANZAPINE 5 MILLIGRAM(S): 15 TABLET, FILM COATED ORAL at 12:01

## 2021-12-08 RX ADMIN — OLANZAPINE 10 MILLIGRAM(S): 15 TABLET, FILM COATED ORAL at 21:56

## 2021-12-08 RX ADMIN — HALOPERIDOL DECANOATE 5 MILLIGRAM(S): 100 INJECTION INTRAMUSCULAR at 19:55

## 2021-12-08 RX ADMIN — Medication 2 MILLIGRAM(S): at 06:18

## 2021-12-08 RX ADMIN — Medication 650 MILLIGRAM(S): at 17:06

## 2021-12-08 NOTE — DIETITIAN INITIAL EVALUATION ADULT. - PERTINENT MEDS FT
MEDICATIONS  (STANDING):  benztropine 1 milliGRAM(s) Oral two times a day  diVALproex  milliGRAM(s) Oral two times a day  enoxaparin Injectable 40 milliGRAM(s) SubCutaneous daily  ferrous    sulfate 325 milliGRAM(s) Oral daily  OLANZapine 5 milliGRAM(s) Oral daily  OLANZapine 10 milliGRAM(s) Oral at bedtime  thiamine 100 milliGRAM(s) Oral daily    MEDICATIONS  (PRN):  acetaminophen     Tablet .. 650 milliGRAM(s) Oral every 6 hours PRN Temp greater or equal to 38C (100.4F), Mild Pain (1 - 3)  diphenhydrAMINE Injectable 50 milliGRAM(s) IntraMuscular every 6 hours PRN Agitation or EPS  haloperidol    Injectable 5 milliGRAM(s) IntraMuscular every 6 hours PRN Agitation  LORazepam   Injectable 2 milliGRAM(s) IV Push every 6 hours PRN Agitation  melatonin 3 milliGRAM(s) Oral at bedtime PRN Insomnia

## 2021-12-08 NOTE — DIETITIAN INITIAL EVALUATION ADULT. - REASON
Nutrition-focused physical examination deferred at this time - pt with confusion, agitation, unable to consent. No overt signs of fat/muscle wasting visually observed

## 2021-12-08 NOTE — DIETITIAN INITIAL EVALUATION ADULT. - PROBLEM SELECTOR PLAN 1
BAL negative. Per patient, history of alcohol use disorder. (+) Benzo.  - Placed on CIWA protocol with symptom triggered Ativan  - started thiamine 500 mg IV TID x 3 days then go to thiamine 100 mg daily  - f/u TSH  - started folate 1 mg daily  - Social work consult  -  following: apprec recs

## 2021-12-08 NOTE — DIETITIAN INITIAL EVALUATION ADULT. - PROBLEM SELECTOR PLAN 4
COVID-19+ on admission. Not endorsing SOB. SpO2 97% on RA. Unclear when sxs began, as patient poor historian.  - Monitor SpO2 and provide O2 support PRN  - CTH showed severe R maxillary sinus thickening and mucosal thickening ethmoid air cells and R frontal sinus

## 2021-12-08 NOTE — DIETITIAN INITIAL EVALUATION ADULT. - CHIEF COMPLAINT
"51 year old male (likely undomiciled) with PMHx alcohol abuse, schizophrenia? p/w AMS likely 2/2 alcohol use vs schizophrenia. Frequent episodes of combative/agitated behavior, started on zyprexa 5mg BID + Haldol 5 PRN. Suspect underlying psych issue. Also testing positive for COVID."

## 2021-12-08 NOTE — PROVIDER CONTACT NOTE (OTHER) - SITUATION
Unable to assess for CIWA and neurochecks. Pt. is restless, agitated and combative, and does not cooperate with staff.
Patient diagnosed with Covid >10 days ago (+ 11/29). Asymptomatic. No further isolation required.

## 2021-12-08 NOTE — PROGRESS NOTE ADULT - PROBLEM SELECTOR PLAN 5
Diet: regular  Dispo: 2PC when inpatient Psych bed becomes available  DVT ppx: none, ambulating (though should be on Lovenox ; c/t  defer given agitation)    Nephew Felipe Severino [phone]  Dr. Momo [place] - mother is alive [phone]

## 2021-12-08 NOTE — DIETITIAN INITIAL EVALUATION ADULT. - OTHER INFO
Per RN and per flowsheet, good appetite with good PO intake in-house. RN reports pt eating well, likes oreos and chocolate pudding. No chewing/swallowing difficulty noted.     Per RN and chart, no nausea, vomiting, constipation, diarrhea.  Last BM 12/07 per chart. Pt not currently on bowel regimen.     Per EMR, pt currently receiving thiamin, folic acid, and ferrous sulfate supplements/micronutrients in-house.    Unable to obtain UBW from pt.   Dosing wt: not documented in chart.   Wt history per [facility]: 193 lbs (12-07).  RD to continue to monitor weight trends as able/available.     Based on 193 lbs and 180.3 cm (Noted in [facility]), pt's BMI = 27.0 kg/m^2  Food preferences obtained and noted.

## 2021-12-08 NOTE — DIETITIAN INITIAL EVALUATION ADULT. - ADD RECOMMEND
1) Would recommend Regular diet.    2) Continue supplementation of thiamin, folic acid, and ferrous sulfate as medically appropriate.    3) Monitor PO intake, GI tolerance, skin integrity, labs, weight.      4) Honor food preferences as feasible.     5) RD remains available upon request and will follow-up per protocol.

## 2021-12-08 NOTE — BH CONSULTATION LIAISON PROGRESS NOTE - NSBHFUPINTERVALHXFT_PSY_A_CORE
Seen and evaluated, awake and alert oriented to person, states he is in the hospital.  Patient yelling profanities, abruptly yelling. pt remains disorganized, refusing to answer questions. denies si/hi.

## 2021-12-08 NOTE — DIETITIAN INITIAL EVALUATION ADULT. - PROBLEM SELECTOR PLAN 2
Per patient, history of schizophrenia not on any medications.   - Haldol 2.5 - 5 mg IV Q6h PRN for agitation if QTc < 500  - SBIRT consult: apprec recs  -  following: apprec recs

## 2021-12-08 NOTE — PROGRESS NOTE ADULT - SUBJECTIVE AND OBJECTIVE BOX
Patient is a 51y old  Male who presents with a chief complaint of COVID and ETOH withdrawal ? (08 Dec 2021 11:49)      SUBJECTIVE / OVERNIGHT EVENTS: pt more calm today, denies cp, sob, chills, reports he has generalized pain everywhere     MEDICATIONS  (STANDING):  benztropine 1 milliGRAM(s) Oral two times a day  diVALproex  milliGRAM(s) Oral two times a day  enoxaparin Injectable 40 milliGRAM(s) SubCutaneous daily  ferrous    sulfate 325 milliGRAM(s) Oral daily  OLANZapine 5 milliGRAM(s) Oral daily  OLANZapine 10 milliGRAM(s) Oral at bedtime  thiamine 100 milliGRAM(s) Oral daily    MEDICATIONS  (PRN):  acetaminophen     Tablet .. 650 milliGRAM(s) Oral every 6 hours PRN Temp greater or equal to 38C (100.4F), Mild Pain (1 - 3)  diphenhydrAMINE Injectable 50 milliGRAM(s) IntraMuscular every 6 hours PRN Agitation or EPS  haloperidol    Injectable 5 milliGRAM(s) IntraMuscular every 6 hours PRN Agitation  LORazepam   Injectable 2 milliGRAM(s) IV Push every 6 hours PRN Agitation  melatonin 3 milliGRAM(s) Oral at bedtime PRN Insomnia        CAPILLARY BLOOD GLUCOSE        I&O's Summary    07 Dec 2021 07:01  -  08 Dec 2021 07:00  --------------------------------------------------------  IN: 1240 mL / OUT: 1350 mL / NET: -110 mL    08 Dec 2021 07:01  -  08 Dec 2021 14:41  --------------------------------------------------------  IN: 800 mL / OUT: 400 mL / NET: 400 mL        PHYSICAL EXAM:  GENERAL: NAD, well-developed  HEAD:  Atraumatic, Normocephalic  EYES:  conjunctiva and sclera clear  NECK:  No JVD  CHEST/LUNG: Clear to auscultation bilaterally; No wheeze  HEART: Regular rate and rhythm; No murmurs, rubs, or gallops  ABDOMEN: Soft, Nontender, Nondistended; Bowel sounds present  EXTREMITIES:  2+ Peripheral Pulses, No clubbing, cyanosis, or edema  PSYCH: AAOx3      LABS:    12-08    141  |  106  |  12  ----------------------------<  80  4.4   |  23  |  0.50    Ca    8.8      08 Dec 2021 11:55  Mg     2.0     12-08    TPro  7.0  /  Alb  3.7  /  TBili  0.3  /  DBili  x   /  AST  20  /  ALT  18  /  AlkPhos  78  12-08              RADIOLOGY & ADDITIONAL TESTS:    Imaging Personally Reviewed:    Consultant(s) Notes Reviewed:      Care Discussed with Consultants/Other Providers:

## 2021-12-08 NOTE — PROVIDER CONTACT NOTE (OTHER) - REASON
Discontinuation of Isolation for COVID patient
Unable to assess for CIWA and neurochecks. Pt. is restless, agitated and combative, and does not cooperate with staff.

## 2021-12-08 NOTE — DIETITIAN INITIAL EVALUATION ADULT. - ORAL INTAKE PTA/DIET HISTORY
Attempted to interview pt, but pt agitated, confused, and not amenable to interview. Unknown appetite PTA, unknown if following any therapeutic diet. NKFA per chart. No use of dietary supplements/micronutrients noted in H&P. Noted per H&P, pt with missing teeth.

## 2021-12-08 NOTE — BH CONSULTATION LIAISON PROGRESS NOTE - NSBHCONSULTSUBSTANCEALCOHOL_PSY_A_CORE FT
thiamine 500mg IV TID x9 doses
1. CIWA, thiamine/MVI/folic acid.  Thiamine 500mg IV tid x9 doses  2. PRN: Ativan 2mg PO q2h PRN sx-triggered CIWA
1. CIWA, thiamine/MVI/folic acid.  Thiamine 500mg IV tid x9 doses  2. PRN: Ativan 2mg PO q2h PRN sx-triggered CIWA
thiamine 500mg IV TID x9 doses
1. CIWA, thiamine/MVI/folic acid.  Thiamine 500mg IV tid x9 doses  2. PRN: Ativan 2mg PO q2h PRN sx-triggered CIWA
1. CIWA, thiamine/MVI/folic acid.  Thiamine 500mg IV tid x9 doses  2. PRN: Ativan 2mg PO q2h PRN sx-triggered CIWA  3.  for substance abuse referral resources.  OP psych f/u at [facility]: [phone]
thiamine 500mg IV TID x9 doses

## 2021-12-09 LAB
ALBUMIN SERPL ELPH-MCNC: 3.5 G/DL — SIGNIFICANT CHANGE UP (ref 3.3–5)
ALP SERPL-CCNC: 73 U/L — SIGNIFICANT CHANGE UP (ref 40–120)
ALT FLD-CCNC: 17 U/L — SIGNIFICANT CHANGE UP (ref 10–45)
ANION GAP SERPL CALC-SCNC: 12 MMOL/L — SIGNIFICANT CHANGE UP (ref 5–17)
AST SERPL-CCNC: 14 U/L — SIGNIFICANT CHANGE UP (ref 10–40)
BASOPHILS # BLD AUTO: 0.04 K/UL — SIGNIFICANT CHANGE UP (ref 0–0.2)
BASOPHILS NFR BLD AUTO: 0.5 % — SIGNIFICANT CHANGE UP (ref 0–2)
BILIRUB SERPL-MCNC: 0.2 MG/DL — SIGNIFICANT CHANGE UP (ref 0.2–1.2)
BUN SERPL-MCNC: 14 MG/DL — SIGNIFICANT CHANGE UP (ref 7–23)
CALCIUM SERPL-MCNC: 8.8 MG/DL — SIGNIFICANT CHANGE UP (ref 8.4–10.5)
CHLORIDE SERPL-SCNC: 103 MMOL/L — SIGNIFICANT CHANGE UP (ref 96–108)
CO2 SERPL-SCNC: 24 MMOL/L — SIGNIFICANT CHANGE UP (ref 22–31)
CREAT SERPL-MCNC: 0.52 MG/DL — SIGNIFICANT CHANGE UP (ref 0.5–1.3)
EOSINOPHIL # BLD AUTO: 0.15 K/UL — SIGNIFICANT CHANGE UP (ref 0–0.5)
EOSINOPHIL NFR BLD AUTO: 1.8 % — SIGNIFICANT CHANGE UP (ref 0–6)
GLUCOSE SERPL-MCNC: 101 MG/DL — HIGH (ref 70–99)
HCT VFR BLD CALC: 34.3 % — LOW (ref 39–50)
HGB BLD-MCNC: 10.1 G/DL — LOW (ref 13–17)
IMM GRANULOCYTES NFR BLD AUTO: 0.5 % — SIGNIFICANT CHANGE UP (ref 0–1.5)
LYMPHOCYTES # BLD AUTO: 2.14 K/UL — SIGNIFICANT CHANGE UP (ref 1–3.3)
LYMPHOCYTES # BLD AUTO: 25.2 % — SIGNIFICANT CHANGE UP (ref 13–44)
MAGNESIUM SERPL-MCNC: 1.8 MG/DL — SIGNIFICANT CHANGE UP (ref 1.6–2.6)
MCHC RBC-ENTMCNC: 20.7 PG — LOW (ref 27–34)
MCHC RBC-ENTMCNC: 29.4 GM/DL — LOW (ref 32–36)
MCV RBC AUTO: 70.3 FL — LOW (ref 80–100)
MONOCYTES # BLD AUTO: 1.09 K/UL — HIGH (ref 0–0.9)
MONOCYTES NFR BLD AUTO: 12.8 % — SIGNIFICANT CHANGE UP (ref 2–14)
NEUTROPHILS # BLD AUTO: 5.04 K/UL — SIGNIFICANT CHANGE UP (ref 1.8–7.4)
NEUTROPHILS NFR BLD AUTO: 59.2 % — SIGNIFICANT CHANGE UP (ref 43–77)
NRBC # BLD: 0 /100 WBCS — SIGNIFICANT CHANGE UP (ref 0–0)
PLATELET # BLD AUTO: 332 K/UL — SIGNIFICANT CHANGE UP (ref 150–400)
POTASSIUM SERPL-MCNC: 4 MMOL/L — SIGNIFICANT CHANGE UP (ref 3.5–5.3)
POTASSIUM SERPL-SCNC: 4 MMOL/L — SIGNIFICANT CHANGE UP (ref 3.5–5.3)
PROT SERPL-MCNC: 6.6 G/DL — SIGNIFICANT CHANGE UP (ref 6–8.3)
PYRIDOXAL PHOS SERPL-MCNC: 4.3 UG/L — LOW (ref 5.3–46.7)
RBC # BLD: 4.88 M/UL — SIGNIFICANT CHANGE UP (ref 4.2–5.8)
RBC # FLD: 20 % — HIGH (ref 10.3–14.5)
SODIUM SERPL-SCNC: 139 MMOL/L — SIGNIFICANT CHANGE UP (ref 135–145)
WBC # BLD: 8.5 K/UL — SIGNIFICANT CHANGE UP (ref 3.8–10.5)
WBC # FLD AUTO: 8.5 K/UL — SIGNIFICANT CHANGE UP (ref 3.8–10.5)

## 2021-12-09 PROCEDURE — 99232 SBSQ HOSP IP/OBS MODERATE 35: CPT

## 2021-12-09 PROCEDURE — 99233 SBSQ HOSP IP/OBS HIGH 50: CPT

## 2021-12-09 RX ADMIN — Medication 650 MILLIGRAM(S): at 17:54

## 2021-12-09 RX ADMIN — HALOPERIDOL DECANOATE 5 MILLIGRAM(S): 100 INJECTION INTRAMUSCULAR at 19:56

## 2021-12-09 RX ADMIN — Medication 1 MILLIGRAM(S): at 06:06

## 2021-12-09 RX ADMIN — DIVALPROEX SODIUM 500 MILLIGRAM(S): 500 TABLET, DELAYED RELEASE ORAL at 17:55

## 2021-12-09 RX ADMIN — OLANZAPINE 5 MILLIGRAM(S): 15 TABLET, FILM COATED ORAL at 13:05

## 2021-12-09 RX ADMIN — Medication 650 MILLIGRAM(S): at 15:24

## 2021-12-09 RX ADMIN — DIVALPROEX SODIUM 500 MILLIGRAM(S): 500 TABLET, DELAYED RELEASE ORAL at 06:06

## 2021-12-09 RX ADMIN — OLANZAPINE 10 MILLIGRAM(S): 15 TABLET, FILM COATED ORAL at 21:12

## 2021-12-09 RX ADMIN — Medication 325 MILLIGRAM(S): at 13:05

## 2021-12-09 RX ADMIN — Medication 1 MILLIGRAM(S): at 17:55

## 2021-12-09 RX ADMIN — ENOXAPARIN SODIUM 40 MILLIGRAM(S): 100 INJECTION SUBCUTANEOUS at 13:05

## 2021-12-09 RX ADMIN — Medication 100 MILLIGRAM(S): at 13:05

## 2021-12-09 NOTE — BH CONSULTATION LIAISON PROGRESS NOTE - NSBHFUPINTERVALHXFT_PSY_A_CORE
Seen and evaluated, awake and alert oriented to person, more calm today. Pt c/o body pain all over, wants to be discharged. pt states he has been depressed, had SI, wanted to strangle himself. Pt denies Hi. Pt denies psychosis, depression, rodrigo. pt reports fair appetite, wanted more cookies. pt received haldol prn last night, was agitated, kicking.

## 2021-12-09 NOTE — PROGRESS NOTE ADULT - PROBLEM SELECTOR PLAN 5
Diet: regular  Dispo: 2PC when inpatient Psych bed becomes available  DVT ppx: lovenox     Nephew Felipe Severino [phone]  Dr. Momo [place] - mother is alive [phone]

## 2021-12-09 NOTE — PROGRESS NOTE ADULT - SUBJECTIVE AND OBJECTIVE BOX
Patient is a 51y old  Male who presents with a chief complaint of COVID and ETOH withdrawal ? (08 Dec 2021 14:40)      SUBJECTIVE / OVERNIGHT EVENTS: pt agitated, denies cp, sob    MEDICATIONS  (STANDING):  benztropine 1 milliGRAM(s) Oral two times a day  diVALproex  milliGRAM(s) Oral two times a day  enoxaparin Injectable 40 milliGRAM(s) SubCutaneous daily  ferrous    sulfate 325 milliGRAM(s) Oral daily  OLANZapine 5 milliGRAM(s) Oral daily  OLANZapine 10 milliGRAM(s) Oral at bedtime  thiamine 100 milliGRAM(s) Oral daily    MEDICATIONS  (PRN):  acetaminophen     Tablet .. 650 milliGRAM(s) Oral every 6 hours PRN Temp greater or equal to 38C (100.4F), Mild Pain (1 - 3)  diphenhydrAMINE Injectable 50 milliGRAM(s) IntraMuscular every 6 hours PRN Agitation or EPS  haloperidol    Injectable 5 milliGRAM(s) IntraMuscular every 6 hours PRN Agitation  LORazepam   Injectable 2 milliGRAM(s) IV Push every 6 hours PRN Agitation  melatonin 3 milliGRAM(s) Oral at bedtime PRN Insomnia        CAPILLARY BLOOD GLUCOSE        I&O's Summary    08 Dec 2021 07:01  -  09 Dec 2021 07:00  --------------------------------------------------------  IN: 800 mL / OUT: 700 mL / NET: 100 mL    09 Dec 2021 07:01  -  09 Dec 2021 13:01  --------------------------------------------------------  IN: 120 mL / OUT: 0 mL / NET: 120 mL        PHYSICAL EXAM:  GENERAL: NAD, well-developed  HEAD:  Atraumatic, Normocephalic  EYES: conjunctiva and sclera clear  NECK:  No JVD  CHEST/LUNG: Clear to auscultation bilaterally; No wheeze  HEART: Regular rate and rhythm; S1S2  ABDOMEN: Soft, Nontender, Nondistended; Bowel sounds present  EXTREMITIES:  2+ Peripheral Pulses, No clubbing, cyanosis, or edema  PSYCH: AAOx3    LABS:                        10.1   8.50  )-----------( 332      ( 09 Dec 2021 09:45 )             34.3     12-09    139  |  103  |  14  ----------------------------<  101<H>  4.0   |  24  |  0.52    Ca    8.8      09 Dec 2021 09:45  Mg     1.8     12-09    TPro  6.6  /  Alb  3.5  /  TBili  0.2  /  DBili  x   /  AST  14  /  ALT  17  /  AlkPhos  73  12-09              RADIOLOGY & ADDITIONAL TESTS:    Imaging Personally Reviewed:    Consultant(s) Notes Reviewed:      Care Discussed with Consultants/Other Providers:

## 2021-12-10 ENCOUNTER — INPATIENT (INPATIENT)
Facility: HOSPITAL | Age: 51
LOS: 66 days | Discharge: PSYCHIATRIC OR SUBSTANCE USE FACILITY | End: 2022-02-15
Attending: PSYCHIATRY & NEUROLOGY | Admitting: PSYCHIATRY & NEUROLOGY
Payer: MEDICARE

## 2021-12-10 VITALS
TEMPERATURE: 97 F | RESPIRATION RATE: 17 BRPM | HEART RATE: 86 BPM | OXYGEN SATURATION: 96 % | DIASTOLIC BLOOD PRESSURE: 79 MMHG | SYSTOLIC BLOOD PRESSURE: 132 MMHG

## 2021-12-10 VITALS — TEMPERATURE: 98 F

## 2021-12-10 DIAGNOSIS — F33.9 MAJOR DEPRESSIVE DISORDER, RECURRENT, UNSPECIFIED: ICD-10-CM

## 2021-12-10 LAB
METHYLMALONATE SERPL-SCNC: 292 NMOL/L — SIGNIFICANT CHANGE UP (ref 0–378)
SARS-COV-2 RNA SPEC QL NAA+PROBE: SIGNIFICANT CHANGE UP

## 2021-12-10 PROCEDURE — 99233 SBSQ HOSP IP/OBS HIGH 50: CPT

## 2021-12-10 PROCEDURE — 93010 ELECTROCARDIOGRAM REPORT: CPT

## 2021-12-10 PROCEDURE — 99232 SBSQ HOSP IP/OBS MODERATE 35: CPT

## 2021-12-10 RX ORDER — FERROUS SULFATE 325(65) MG
325 TABLET ORAL DAILY
Refills: 0 | Status: DISCONTINUED | OUTPATIENT
Start: 2021-12-10 | End: 2022-02-15

## 2021-12-10 RX ORDER — DIVALPROEX SODIUM 500 MG/1
1 TABLET, DELAYED RELEASE ORAL
Qty: 0 | Refills: 0 | DISCHARGE
Start: 2021-12-10

## 2021-12-10 RX ORDER — THIAMINE MONONITRATE (VIT B1) 100 MG
1 TABLET ORAL
Qty: 0 | Refills: 0 | DISCHARGE
Start: 2021-12-10

## 2021-12-10 RX ORDER — OLANZAPINE 15 MG/1
5 TABLET, FILM COATED ORAL DAILY
Refills: 0 | Status: DISCONTINUED | OUTPATIENT
Start: 2021-12-10 | End: 2021-12-13

## 2021-12-10 RX ORDER — HALOPERIDOL DECANOATE 100 MG/ML
5 INJECTION INTRAMUSCULAR
Qty: 0 | Refills: 0 | DISCHARGE
Start: 2021-12-10

## 2021-12-10 RX ORDER — LANOLIN ALCOHOL/MO/W.PET/CERES
1 CREAM (GRAM) TOPICAL
Qty: 0 | Refills: 0 | DISCHARGE
Start: 2021-12-10

## 2021-12-10 RX ORDER — DIPHENHYDRAMINE HCL 50 MG
50 CAPSULE ORAL ONCE
Refills: 0 | Status: DISCONTINUED | OUTPATIENT
Start: 2021-12-10 | End: 2022-02-15

## 2021-12-10 RX ORDER — OLANZAPINE 15 MG/1
5 TABLET, FILM COATED ORAL EVERY 6 HOURS
Refills: 0 | Status: DISCONTINUED | OUTPATIENT
Start: 2021-12-10 | End: 2022-02-15

## 2021-12-10 RX ORDER — ACETAMINOPHEN 500 MG
2 TABLET ORAL
Qty: 0 | Refills: 0 | DISCHARGE
Start: 2021-12-10

## 2021-12-10 RX ORDER — LANOLIN ALCOHOL/MO/W.PET/CERES
3 CREAM (GRAM) TOPICAL AT BEDTIME
Refills: 0 | Status: DISCONTINUED | OUTPATIENT
Start: 2021-12-10 | End: 2021-12-13

## 2021-12-10 RX ORDER — OLANZAPINE 15 MG/1
1 TABLET, FILM COATED ORAL
Qty: 0 | Refills: 0 | DISCHARGE
Start: 2021-12-10

## 2021-12-10 RX ORDER — BENZTROPINE MESYLATE 1 MG
1 TABLET ORAL
Qty: 0 | Refills: 0 | DISCHARGE
Start: 2021-12-10

## 2021-12-10 RX ORDER — DIPHENHYDRAMINE HCL 50 MG
50 CAPSULE ORAL
Qty: 0 | Refills: 0 | DISCHARGE
Start: 2021-12-10

## 2021-12-10 RX ORDER — OLANZAPINE 15 MG/1
5 TABLET, FILM COATED ORAL ONCE
Refills: 0 | Status: DISCONTINUED | OUTPATIENT
Start: 2021-12-10 | End: 2021-12-13

## 2021-12-10 RX ORDER — THIAMINE MONONITRATE (VIT B1) 100 MG
100 TABLET ORAL DAILY
Refills: 0 | Status: DISCONTINUED | OUTPATIENT
Start: 2021-12-10 | End: 2022-01-10

## 2021-12-10 RX ORDER — DIVALPROEX SODIUM 500 MG/1
500 TABLET, DELAYED RELEASE ORAL
Refills: 0 | Status: DISCONTINUED | OUTPATIENT
Start: 2021-12-10 | End: 2021-12-22

## 2021-12-10 RX ORDER — OLANZAPINE 15 MG/1
10 TABLET, FILM COATED ORAL AT BEDTIME
Refills: 0 | Status: DISCONTINUED | OUTPATIENT
Start: 2021-12-10 | End: 2021-12-13

## 2021-12-10 RX ORDER — FERROUS SULFATE 325(65) MG
1 TABLET ORAL
Qty: 0 | Refills: 0 | DISCHARGE
Start: 2021-12-10

## 2021-12-10 RX ORDER — BENZTROPINE MESYLATE 1 MG
1 TABLET ORAL
Refills: 0 | Status: DISCONTINUED | OUTPATIENT
Start: 2021-12-10 | End: 2021-12-13

## 2021-12-10 RX ADMIN — DIVALPROEX SODIUM 500 MILLIGRAM(S): 500 TABLET, DELAYED RELEASE ORAL at 05:35

## 2021-12-10 RX ADMIN — Medication 1 MILLIGRAM(S): at 05:34

## 2021-12-10 RX ADMIN — Medication 325 MILLIGRAM(S): at 12:25

## 2021-12-10 RX ADMIN — Medication 100 MILLIGRAM(S): at 12:25

## 2021-12-10 RX ADMIN — ENOXAPARIN SODIUM 40 MILLIGRAM(S): 100 INJECTION SUBCUTANEOUS at 12:25

## 2021-12-10 RX ADMIN — Medication 650 MILLIGRAM(S): at 19:47

## 2021-12-10 RX ADMIN — Medication 2 MILLIGRAM(S): at 12:02

## 2021-12-10 RX ADMIN — OLANZAPINE 5 MILLIGRAM(S): 15 TABLET, FILM COATED ORAL at 12:25

## 2021-12-10 RX ADMIN — Medication 650 MILLIGRAM(S): at 19:42

## 2021-12-10 RX ADMIN — Medication 3 MILLIGRAM(S): at 19:42

## 2021-12-10 NOTE — BH CONSULTATION LIAISON PROGRESS NOTE - NSBHMSEPERCEPT_PSY_A_CORE
Auditory hallucinations
Auditory hallucinations
No abnormalities
Auditory hallucinations

## 2021-12-10 NOTE — BH CONSULTATION LIAISON PROGRESS NOTE - CURRENT MEDICATION
MEDICATIONS  (STANDING):  benztropine 1 milliGRAM(s) Oral two times a day  diVALproex  milliGRAM(s) Oral two times a day  enoxaparin Injectable 40 milliGRAM(s) SubCutaneous daily  ferrous    sulfate 325 milliGRAM(s) Oral daily  OLANZapine 5 milliGRAM(s) Oral daily  OLANZapine 10 milliGRAM(s) Oral at bedtime  thiamine 100 milliGRAM(s) Oral daily    MEDICATIONS  (PRN):  acetaminophen     Tablet .. 650 milliGRAM(s) Oral every 6 hours PRN Temp greater or equal to 38C (100.4F), Mild Pain (1 - 3)  diphenhydrAMINE Injectable 50 milliGRAM(s) IntraMuscular every 6 hours PRN Agitation or EPS  haloperidol    Injectable 5 milliGRAM(s) IntraMuscular every 6 hours PRN Agitation  LORazepam   Injectable 2 milliGRAM(s) IV Push every 6 hours PRN Agitation  melatonin 3 milliGRAM(s) Oral at bedtime PRN Insomnia  
MEDICATIONS  (STANDING):  benztropine 1 milliGRAM(s) Oral two times a day  enoxaparin Injectable 40 milliGRAM(s) SubCutaneous daily  ferrous    sulfate 325 milliGRAM(s) Oral daily  OLANZapine 5 milliGRAM(s) Oral daily  OLANZapine 10 milliGRAM(s) Oral at bedtime  thiamine 100 milliGRAM(s) Oral daily    MEDICATIONS  (PRN):  diphenhydrAMINE Injectable 50 milliGRAM(s) IntraMuscular every 6 hours PRN Agitation or EPS  haloperidol    Injectable 5 milliGRAM(s) IntraMuscular every 6 hours PRN Agitation  LORazepam   Injectable 2 milliGRAM(s) IV Push every 6 hours PRN Agitation  melatonin 3 milliGRAM(s) Oral at bedtime PRN Insomnia  
MEDICATIONS  (STANDING):  ascorbic acid 500 milliGRAM(s) Oral daily  enoxaparin Injectable 40 milliGRAM(s) SubCutaneous daily  ferrous    sulfate 325 milliGRAM(s) Oral daily  folic acid 1 milliGRAM(s) Oral daily  multivitamin 1 Tablet(s) Oral daily  OLANZapine 5 milliGRAM(s) Oral daily  OLANZapine 7.5 milliGRAM(s) Oral at bedtime  thiamine 100 milliGRAM(s) Oral daily    MEDICATIONS  (PRN):  diphenhydrAMINE 50 milliGRAM(s) Oral every 6 hours PRN Agitation  haloperidol    Injectable 5 milliGRAM(s) IntraMuscular every 6 hours PRN Agitation  LORazepam   Injectable 2 milliGRAM(s) IV Push every 6 hours PRN Agitation  melatonin 3 milliGRAM(s) Oral at bedtime PRN Insomnia  
MEDICATIONS  (STANDING):  benztropine 1 milliGRAM(s) Oral two times a day  diVALproex  milliGRAM(s) Oral two times a day  enoxaparin Injectable 40 milliGRAM(s) SubCutaneous daily  ferrous    sulfate 325 milliGRAM(s) Oral daily  OLANZapine 5 milliGRAM(s) Oral daily  OLANZapine 10 milliGRAM(s) Oral at bedtime  thiamine 100 milliGRAM(s) Oral daily    MEDICATIONS  (PRN):  acetaminophen     Tablet .. 650 milliGRAM(s) Oral every 6 hours PRN Temp greater or equal to 38C (100.4F), Mild Pain (1 - 3)  diphenhydrAMINE Injectable 50 milliGRAM(s) IntraMuscular every 6 hours PRN Agitation or EPS  haloperidol    Injectable 5 milliGRAM(s) IntraMuscular every 6 hours PRN Agitation  LORazepam   Injectable 2 milliGRAM(s) IV Push every 6 hours PRN Agitation  melatonin 3 milliGRAM(s) Oral at bedtime PRN Insomnia  
MEDICATIONS  (STANDING):  enoxaparin Injectable 40 milliGRAM(s) SubCutaneous daily  folic acid 1 milliGRAM(s) Oral daily  multivitamin 1 Tablet(s) Oral daily  thiamine IVPB 500 milliGRAM(s) IV Intermittent three times a day    MEDICATIONS  (PRN):  LORazepam     Tablet 2 milliGRAM(s) Oral every 2 hours PRN Threatening behavior  LORazepam     Tablet 2 milliGRAM(s) Oral every 1 hour PRN Symptom-triggered: each CIWA -Ar score 8 or GREATER  LORazepam   Injectable 2 milliGRAM(s) IV Push every 1 hour PRN Symptom-triggered: each CIWA -Ar score 8 or GREATER  melatonin 3 milliGRAM(s) Oral at bedtime PRN Insomnia  ondansetron Injectable 4 milliGRAM(s) IV Push every 8 hours PRN Nausea and/or Vomiting  
MEDICATIONS  (STANDING):  enoxaparin Injectable 40 milliGRAM(s) SubCutaneous daily  folic acid 1 milliGRAM(s) Oral daily  multivitamin 1 Tablet(s) Oral daily  OLANZapine 5 milliGRAM(s) Oral at bedtime  thiamine IVPB 500 milliGRAM(s) IV Intermittent three times a day    MEDICATIONS  (PRN):  LORazepam     Tablet 2 milliGRAM(s) Oral every 2 hours PRN Threatening behavior  LORazepam     Tablet 2 milliGRAM(s) Oral every 1 hour PRN Symptom-triggered: each CIWA -Ar score 8 or GREATER  LORazepam   Injectable 2 milliGRAM(s) IV Push every 1 hour PRN Symptom-triggered: each CIWA -Ar score 8 or GREATER  melatonin 3 milliGRAM(s) Oral at bedtime PRN Insomnia  ondansetron Injectable 4 milliGRAM(s) IV Push every 8 hours PRN Nausea and/or Vomiting  
MEDICATIONS  (STANDING):  ascorbic acid 500 milliGRAM(s) Oral daily  enoxaparin Injectable 40 milliGRAM(s) SubCutaneous daily  ferrous    sulfate 325 milliGRAM(s) Oral daily  folic acid 1 milliGRAM(s) Oral daily  multivitamin 1 Tablet(s) Oral daily  OLANZapine 5 milliGRAM(s) Oral daily  OLANZapine 7.5 milliGRAM(s) Oral at bedtime  thiamine 100 milliGRAM(s) Oral daily    MEDICATIONS  (PRN):  diphenhydrAMINE 50 milliGRAM(s) Oral every 6 hours PRN Agitation  haloperidol    Injectable 5 milliGRAM(s) IntraMuscular every 6 hours PRN Agitation  LORazepam     Tablet 2 milliGRAM(s) Oral every 1 hour PRN Symptom-triggered: each CIWA -Ar score 8 or GREATER  LORazepam   Injectable 2 milliGRAM(s) IV Push every 1 hour PRN Symptom-triggered: each CIWA -Ar score 8 or GREATER  LORazepam   Injectable 2 milliGRAM(s) IV Push every 6 hours PRN Agitation  melatonin 3 milliGRAM(s) Oral at bedtime PRN Insomnia  ondansetron Injectable 4 milliGRAM(s) IV Push every 8 hours PRN Nausea and/or Vomiting  
MEDICATIONS  (STANDING):  benztropine 1 milliGRAM(s) Oral two times a day  diVALproex  milliGRAM(s) Oral two times a day  enoxaparin Injectable 40 milliGRAM(s) SubCutaneous daily  ferrous    sulfate 325 milliGRAM(s) Oral daily  OLANZapine 5 milliGRAM(s) Oral daily  OLANZapine 10 milliGRAM(s) Oral at bedtime  thiamine 100 milliGRAM(s) Oral daily    MEDICATIONS  (PRN):  acetaminophen     Tablet .. 650 milliGRAM(s) Oral every 6 hours PRN Temp greater or equal to 38C (100.4F), Mild Pain (1 - 3)  diphenhydrAMINE Injectable 50 milliGRAM(s) IntraMuscular every 6 hours PRN Agitation or EPS  haloperidol    Injectable 5 milliGRAM(s) IntraMuscular every 6 hours PRN Agitation  LORazepam   Injectable 2 milliGRAM(s) IV Push every 6 hours PRN Agitation  melatonin 3 milliGRAM(s) Oral at bedtime PRN Insomnia  
MEDICATIONS  (STANDING):  ascorbic acid 500 milliGRAM(s) Oral daily  enoxaparin Injectable 40 milliGRAM(s) SubCutaneous daily  ferrous    sulfate 325 milliGRAM(s) Oral daily  folic acid 1 milliGRAM(s) Oral daily  multivitamin 1 Tablet(s) Oral daily  OLANZapine 5 milliGRAM(s) Oral two times a day  thiamine IVPB 500 milliGRAM(s) IV Intermittent once    MEDICATIONS  (PRN):  haloperidol    Injectable 5 milliGRAM(s) IV Push once PRN severe agitation  LORazepam     Tablet 2 milliGRAM(s) Oral every 2 hours PRN Threatening behavior  LORazepam     Tablet 2 milliGRAM(s) Oral every 1 hour PRN Symptom-triggered: each CIWA -Ar score 8 or GREATER  LORazepam   Injectable 2 milliGRAM(s) IV Push every 1 hour PRN Symptom-triggered: each CIWA -Ar score 8 or GREATER  melatonin 3 milliGRAM(s) Oral at bedtime PRN Insomnia  ondansetron Injectable 4 milliGRAM(s) IV Push every 8 hours PRN Nausea and/or Vomiting

## 2021-12-10 NOTE — PROGRESS NOTE ADULT - ASSESSMENT
51 year old male (likely undomiciled) with PMHx alcohol abuse, schizophrenia? p/w AMS likely 2/2 alcohol use vs schizophrenia. Frequent episodes of combative/agitated behavior, started on zyprexa 5mg BID, depakote bid, haldol 5 PRN.  Also testing positive for COVID.

## 2021-12-10 NOTE — BH CONSULTATION LIAISON PROGRESS NOTE - NSBHMSESPEECH_PSY_A_CORE
Abnormal as indicated, otherwise normal...

## 2021-12-10 NOTE — PROGRESS NOTE ADULT - PROVIDER SPECIALTY LIST ADULT
Internal Medicine
Hospitalist
Internal Medicine
Internal Medicine
Hospitalist
Hospitalist
Internal Medicine
Hospitalist
Internal Medicine
Hospitalist

## 2021-12-10 NOTE — BH CONSULTATION LIAISON PROGRESS NOTE - NSBHMSELANG_PSY_A_CORE
No abnormalities noted
Impaired repetition

## 2021-12-10 NOTE — BH CONSULTATION LIAISON PROGRESS NOTE - ORIENTATION
states his real name is "Adam"
Congruent

## 2021-12-10 NOTE — BH CONSULTATION LIAISON PROGRESS NOTE - NSBHMSEBEHAV_PSY_A_CORE
Cooperative
Hostile
Uncooperative/Hostile
Cooperative
Cooperative
Uncooperative

## 2021-12-10 NOTE — PROGRESS NOTE ADULT - REASON FOR ADMISSION
COVID and ETOH withdrawal ?

## 2021-12-10 NOTE — BH CONSULTATION LIAISON PROGRESS NOTE - PRN MEDS
11/30/21: Ativan 2mg @ 1531, 1700  12/1/21: Ativan 2mg @ 0305
Haldol 5mg IM at 1956 on 12/9/21
Haldol 5mg @ 1403 on 12/5/21
11/30/21: Ativan 2mg @ 1531, 1700  12/1/21: Ativan 2mg @ 0305

## 2021-12-10 NOTE — BH CONSULTATION LIAISON PROGRESS NOTE - NSBHPSYCHOLCOGORIENT_PSY_A_CORE
Not fully oriented...

## 2021-12-10 NOTE — PROGRESS NOTE ADULT - SUBJECTIVE AND OBJECTIVE BOX
Patient is a 51y old  Male who presents with a chief complaint of COVID and ETOH withdrawal ? (09 Dec 2021 12:55)      SUBJECTIVE / OVERNIGHT EVENTS: pt aggressive, combative     MEDICATIONS  (STANDING):  benztropine 1 milliGRAM(s) Oral two times a day  diVALproex  milliGRAM(s) Oral two times a day  enoxaparin Injectable 40 milliGRAM(s) SubCutaneous daily  ferrous    sulfate 325 milliGRAM(s) Oral daily  OLANZapine 5 milliGRAM(s) Oral daily  OLANZapine 10 milliGRAM(s) Oral at bedtime  thiamine 100 milliGRAM(s) Oral daily    MEDICATIONS  (PRN):  acetaminophen     Tablet .. 650 milliGRAM(s) Oral every 6 hours PRN Temp greater or equal to 38C (100.4F), Mild Pain (1 - 3)  diphenhydrAMINE Injectable 50 milliGRAM(s) IntraMuscular every 6 hours PRN Agitation or EPS  haloperidol    Injectable 5 milliGRAM(s) IntraMuscular every 6 hours PRN Agitation  LORazepam   Injectable 2 milliGRAM(s) IV Push every 6 hours PRN Agitation  melatonin 3 milliGRAM(s) Oral at bedtime PRN Insomnia        CAPILLARY BLOOD GLUCOSE        I&O's Summary    09 Dec 2021 07:01  -  10 Dec 2021 07:00  --------------------------------------------------------  IN: 360 mL / OUT: 0 mL / NET: 360 mL    10 Dec 2021 07:01  -  10 Dec 2021 14:40  --------------------------------------------------------  IN: 240 mL / OUT: 0 mL / NET: 240 mL        PHYSICAL EXAM:  Pt combative, refusing PE  LABS:                        10.1   8.50  )-----------( 332      ( 09 Dec 2021 09:45 )             34.3     12-09    139  |  103  |  14  ----------------------------<  101<H>  4.0   |  24  |  0.52    Ca    8.8      09 Dec 2021 09:45  Mg     1.8     12-09    TPro  6.6  /  Alb  3.5  /  TBili  0.2  /  DBili  x   /  AST  14  /  ALT  17  /  AlkPhos  73  12-09              RADIOLOGY & ADDITIONAL TESTS:    Imaging Personally Reviewed:    Consultant(s) Notes Reviewed:      Care Discussed with Consultants/Other Providers:

## 2021-12-10 NOTE — BH CONSULTATION LIAISON PROGRESS NOTE - NSICDXBHSECONDARYDX_PSY_ALL_CORE
Alcohol use disorder, moderate, dependence   F10.20  

## 2021-12-10 NOTE — BH CONSULTATION LIAISON PROGRESS NOTE - CASE SUMMARY
51M undomiciled, with unknown PPH/admissions/SA, ?substance abuse significant for ETOH per chart, unknown PMH seen recently at [facility] for head lac and AMS, eloped from [facility] and was banging on a bus window, was BIB EMS to [facility], telepsychiatry saw pt for AMS and reported h/o ?schizophrenia, found COVID +, pending further reassessment by psych day team. On interview, pt AOx1, does not know his age, limited historian, does not recall why he came to ER or how.  Gives erratic and inappropriate answers to questions asked, disorganized, eating items on his food tray voraciously (ie: eggs with shells on).  Denies SI/HI, cannot meaningfully answer questions regarding AVH or history of psychiatric illness/treatments.  Inattentive to interview questions, intermittently lethargic.  Per ED staff was restless, defecated on ED floor.  12/1/21: Pt remains disorganized, lethargic, disoriented, intermittently cursing and shouting racial slurs, states he hears voices to "kill him, kill him," but unable to describe symptoms or give hx of psychiatric diagnoses or treatments.  Per staff has been intermittently agitated, swearing, unclear if internally preoccupied.  Oriented to self only.  Dx: Delirium 2/2 GMC.  Recs: Start Zyprexa Zydis 5mg PO qhS.  C/w CO 1:1, w/u of AMS, CIWA monitoring with symptom-triggered Ativan, Haldol 2.5mg-5mg PO/IV q6h PRN agitation if QTc<500, SW input as pt homeless, C-[facility] psych will f/u.  Agree with NP's assessment and plan as above.
This is a 51-y.o. M patient, presented to [facility] earlier today and reported to have had an bottle of alcohol and presented with head laceration and after repair, asked for place to stay due to cold weather, and subsequently reportedly brought to [facility] after AMA, consult called today for reported schizophrenia. Pt presents as not oriented, with the etiology of this presentation unclear, delirium vs. intoxication vs malingering vs other factors. Can not recall the current year or how old he is, states he is 28 years old.  Patient denies SI/HI.  He reports illicit substance and alcohol use, but can not elaborate on this or quantify the amount he has been abusing.  He presents as disorganized, confused and delirious.  After medical workup, pt still reporting CAH to kill self and others, remains agitated and labile, on Zyprexa.  2PC in chart.    I have seen and evaluated this patient myself. Chart, labs, meds reviewed. I agree with resident's assessment and plan.  
Pt is a 52yo M, presented to [facility] earlier today and reported to have had an bottle of alcohol and presented with head laceration and after repair, asked for place to stay due to cold weather, and subsequently reportedly brought to [facility] after AMA, consult called today for reported schizophrenia. pt remains agitated today, flipped table, SI, cont current meds, awaiting psych bed 
Pt is a 52yo M, presented to [facility] earlier today and reported to have had an bottle of alcohol and presented with head laceration and after repair, asked for place to stay due to cold weather, and subsequently reportedly brought to [facility] after AMA, consult called today for reported schizophrenia. Pt remains disorganized, irritable, yelling.as per staff, pt less agitated the past few days, can start depakote, cont rest of meds

## 2021-12-10 NOTE — PROGRESS NOTE ADULT - PROBLEM SELECTOR PLAN 5
Diet: regular  Dispo: 2PC when inpatient Psych bed becomes available  DVT ppx: lovenox     Nephew Felipe Severino [phone]  Dr. Momo Kingsley [place] - mother is alive [phone]

## 2021-12-10 NOTE — BH PATIENT PROFILE - FALL HARM RISK - UNIVERSAL INTERVENTIONS
Bed in lowest position, wheels locked, appropriate side rails in place/Call bell, personal items and telephone in reach/Instruct patient to call for assistance before getting out of bed or chair/Non-slip footwear when patient is out of bed/[place] to call system/Physically safe environment - no spills, clutter or unnecessary equipment/Purposeful Proactive Rounding/Room/bathroom lighting operational, light cord in reach

## 2021-12-10 NOTE — BH CONSULTATION LIAISON PROGRESS NOTE - NSBHMSETHTCONTENT_PSY_A_CORE
Unremarkable
Suicidality
Suicidality
Suicidality/Homicidality
Unremarkable

## 2021-12-10 NOTE — BH CONSULTATION LIAISON PROGRESS NOTE - NSBHCHARTREVIEWINVESTIGATE_PSY_A_CORE FT
< from: 12 Lead ECG (12.01.21 @ 15:41) >      Ventricular Rate 92 BPM    Atrial Rate 92 BPM    P-R Interval 148 ms    QRS Duration 112 ms    Q-T Interval 376 ms    QTC Calculation(Bazett) 464 ms    P Axis 54 degrees    R Axis 12 degrees    T Axis 57 degrees    Diagnosis Line NORMAL SINUS RHYTHM  PROLONGED QT  ABNORMAL ECG  WHEN COMPARED WITH ECG OF 29-NOV-2021 12:54,  SIGNIFICANT CHANGES HAVE OCCURRED  Confirmed by EZE COLON, YARED MULLER ([id number]) on 12/2/2021 3:19:16 PM    < end of copied text >    
< from: 12 Lead ECG (11.29.21 @ 12:54) >Monitor EKG for QTc <500.      Ventricular Rate 87 BPM    Atrial Rate 87 BPM    P-R Interval 146 ms    QRS Duration 116 ms    Q-T Interval 408 ms    QTC Calculation(Bazett) 490 ms    P Axis 66 degrees    R Axis 43 degrees    T Axis 47 degrees    Diagnosis Line SINUS RHYTHM WITH PREMATURE SUPRAVENTRICULAR COMPLEXES  INCOMPLETE RIGHT BUNDLE BRANCH BLOCK  NONSPECIFIC T WAVE ABNORMALITY  PROLONGED QT  ABNORMAL ECG  NO PREVIOUS ECGS AVAILABLE  Confirmed by CARTER CASH, SHANTELLE ([id number]) on 11/30/2021 5:22:24 PM    < end of copied text >    
Ventricular Rate 92 BPM    Atrial Rate 92 BPM    P-R Interval 148 ms    QRS Duration 112 ms    Q-T Interval 376 ms    QTC Calculation(Bazett) 464 ms    P Axis 54 degrees    R Axis 12 degrees    T Axis 57 degrees    Diagnosis Line NORMAL SINUS RHYTHM  PROLONGED QT  ABNORMAL ECG  WHEN COMPARED WITH ECG OF 29-NOV-2021 12:54,  SIGNIFICANT CHANGES HAVE OCCURRED  Confirmed by EZE COLON, YARED MULLER ([id number]) on 12/2/2021 3:19:16 PM  
< from: 12 Lead ECG (11.29.21 @ 12:54) >      Ventricular Rate 87 BPM    Atrial Rate 87 BPM    P-R Interval 146 ms    QRS Duration 116 ms    Q-T Interval 408 ms    QTC Calculation(Bazett) 490 ms    P Axis 66 degrees    R Axis 43 degrees    T Axis 47 degrees    Diagnosis Line SINUS RHYTHM WITH PREMATURE SUPRAVENTRICULAR COMPLEXES  INCOMPLETE RIGHT BUNDLE BRANCH BLOCK  NONSPECIFIC T WAVE ABNORMALITY  PROLONGED QT  ABNORMAL ECG  NO PREVIOUS ECGS AVAILABLE  Confirmed by SHANTELLE MACHADO MD [id number]) on 11/30/2021 5:22:24 PM    < end of copied text >    
< from: 12 Lead ECG (11.29.21 @ 12:54) >Monitor EKG for QTc <500.      Ventricular Rate 87 BPM    Atrial Rate 87 BPM    P-R Interval 146 ms    QRS Duration 116 ms    Q-T Interval 408 ms    QTC Calculation(Bazett) 490 ms    P Axis 66 degrees    R Axis 43 degrees    T Axis 47 degrees    Diagnosis Line SINUS RHYTHM WITH PREMATURE SUPRAVENTRICULAR COMPLEXES  INCOMPLETE RIGHT BUNDLE BRANCH BLOCK  NONSPECIFIC T WAVE ABNORMALITY  PROLONGED QT  ABNORMAL ECG  NO PREVIOUS ECGS AVAILABLE  Confirmed by CARTER CASH, SHANTELLE ([id number]) on 11/30/2021 5:22:24 PM    < end of copied text >    
< from: 12 Lead ECG (12.01.21 @ 15:41) >      Ventricular Rate 92 BPM    Atrial Rate 92 BPM    P-R Interval 148 ms    QRS Duration 112 ms    Q-T Interval 376 ms    QTC Calculation(Bazett) 464 ms    P Axis 54 degrees    R Axis 12 degrees    T Axis 57 degrees    Diagnosis Line NORMAL SINUS RHYTHM  PROLONGED QT  ABNORMAL ECG  WHEN COMPARED WITH ECG OF 29-NOV-2021 12:54,  SIGNIFICANT CHANGES HAVE OCCURRED  Confirmed by EZE COLON, YARED MULLER ([id number]) on 12/2/2021 3:19:16 PM    < end of copied text >    
Ventricular Rate 92 BPM    Atrial Rate 92 BPM    P-R Interval 148 ms    QRS Duration 112 ms    Q-T Interval 376 ms    QTC Calculation(Bazett) 464 ms    P Axis 54 degrees    R Axis 12 degrees    T Axis 57 degrees    Diagnosis Line NORMAL SINUS RHYTHM  PROLONGED QT  ABNORMAL ECG  WHEN COMPARED WITH ECG OF 29-NOV-2021 12:54,  SIGNIFICANT CHANGES HAVE OCCURRED  Confirmed by EZE COLON, YARED MULLER ([id number]) on 12/2/2021 3:19:16 PM  
< from: 12 Lead ECG (11.29.21 @ 12:54) >      Ventricular Rate 87 BPM    Atrial Rate 87 BPM    P-R Interval 146 ms    QRS Duration 116 ms    Q-T Interval 408 ms    QTC Calculation(Bazett) 490 ms    P Axis 66 degrees    R Axis 43 degrees    T Axis 47 degrees    Diagnosis Line SINUS RHYTHM WITH PREMATURE SUPRAVENTRICULAR COMPLEXES  INCOMPLETE RIGHT BUNDLE BRANCH BLOCK  NONSPECIFIC T WAVE ABNORMALITY  PROLONGED QT  ABNORMAL ECG  NO PREVIOUS ECGS AVAILABLE  Confirmed by SHANTELLE MACHADO MD ([id number]) on 11/30/2021 5:22:24 PM    < end of copied text >    
Ventricular Rate 92 BPM    Atrial Rate 92 BPM    P-R Interval 148 ms    QRS Duration 112 ms    Q-T Interval 376 ms    QTC Calculation(Bazett) 464 ms    P Axis 54 degrees    R Axis 12 degrees    T Axis 57 degrees    Diagnosis Line NORMAL SINUS RHYTHM  PROLONGED QT  ABNORMAL ECG  WHEN COMPARED WITH ECG OF 29-NOV-2021 12:54,  SIGNIFICANT CHANGES HAVE OCCURRED  Confirmed by EZE COLON, YARED MULLER ([id number]) on 12/2/2021 3:19:16 PM

## 2021-12-10 NOTE — DISCHARGE NOTE NURSING/CASE MANAGEMENT/SOCIAL WORK - NSDCPEFALRISK_GEN_ALL_CORE
For information on Fall & Injury Prevention, visit: [email or web address] OR  [email or web address] OR  https://www.cdc.gov/steadi/patient.html

## 2021-12-10 NOTE — BH CONSULTATION LIAISON PROGRESS NOTE - NSBHCONSFOLLOWNEEDS_PSY_ALL_CORE
Needs further psych safety assessment prior to discharge

## 2021-12-10 NOTE — BH CONSULTATION LIAISON PROGRESS NOTE - NSBHMSETHTPROC_PSY_A_CORE
Disorganized/Illogical/Impaired reasoning

## 2021-12-10 NOTE — BH CONSULTATION LIAISON PROGRESS NOTE - NSBHCHARTREVIEWVS_PSY_A_CORE FT
Vital Signs Last 24 Hrs  T(C): 36.6 (06 Dec 2021 06:49), Max: 37.2 (05 Dec 2021 20:06)  T(F): 97.9 (06 Dec 2021 06:49), Max: 99 (05 Dec 2021 20:06)  HR: 93 (06 Dec 2021 06:49) (93 - 100)  BP: 139/92 (06 Dec 2021 06:49) (139/92 - 158/95)  BP(mean): --  RR: 18 (06 Dec 2021 06:49) (17 - 18)  SpO2: 97% (06 Dec 2021 06:49) (97% - 97%)
Vital Signs Last 24 Hrs  T(C): 36.8 (08 Dec 2021 15:53), Max: 36.8 (08 Dec 2021 15:53)  T(F): 98.3 (08 Dec 2021 15:53), Max: 98.3 (08 Dec 2021 15:53)  HR: 87 (08 Dec 2021 15:53) (87 - 98)  BP: 139/74 (08 Dec 2021 15:53) (139/74 - 139/89)  BP(mean): --  RR: 17 (08 Dec 2021 15:53) (17 - 18)  SpO2: 100% (08 Dec 2021 15:53) (96% - 100%)
Vital Signs Last 24 Hrs  T(C): 36.6 (09 Dec 2021 05:35), Max: 37.6 (08 Dec 2021 21:54)  T(F): 97.8 (09 Dec 2021 05:35), Max: 99.6 (08 Dec 2021 21:54)  HR: 88 (09 Dec 2021 05:35) (87 - 96)  BP: 144/84 (09 Dec 2021 05:35) (139/74 - 144/84)  BP(mean): --  RR: 18 (09 Dec 2021 05:35) (17 - 18)  SpO2: 96% (09 Dec 2021 05:35) (95% - 100%)
Vital Signs Last 24 Hrs  T(C): 36.3 (04 Dec 2021 10:15), Max: 36.7 (03 Dec 2021 14:16)  T(F): 97.3 (04 Dec 2021 10:15), Max: 98.1 (03 Dec 2021 14:16)  HR: 90 (04 Dec 2021 10:15) (90 - 104)  BP: 147/100 (04 Dec 2021 10:15) (126/74 - 147/100)  BP(mean): --  RR: 18 (04 Dec 2021 10:15) (18 - 18)  SpO2: 96% (04 Dec 2021 10:15) (96% - 97%)
Vital Signs Last 24 Hrs  T(C): 36.2 (01 Dec 2021 09:30), Max: 37.1 (30 Nov 2021 13:34)  T(F): 97.2 (01 Dec 2021 09:30), Max: 98.7 (30 Nov 2021 13:34)  HR: 84 (01 Dec 2021 09:30) (76 - 101)  BP: 124/80 (01 Dec 2021 09:30) (122/78 - 154/92)  BP(mean): --  RR: 19 (01 Dec 2021 09:30) (16 - 19)  SpO2: 95% (01 Dec 2021 09:30) (95% - 100%)
Vital Signs Last 24 Hrs  T(C): 36.1 (10 Dec 2021 05:15), Max: 37.7 (09 Dec 2021 13:27)  T(F): 97 (10 Dec 2021 05:15), Max: 99.9 (09 Dec 2021 13:27)  HR: 85 (10 Dec 2021 05:15) (81 - 100)  BP: 135/89 (10 Dec 2021 05:15) (122/74 - 135/89)  BP(mean): --  RR: 17 (10 Dec 2021 05:15) (16 - 17)  SpO2: 96% (10 Dec 2021 05:15) (96% - 97%)
Vital Signs Last 24 Hrs  T(C): 36.1 (04 Dec 2021 22:38), Max: 36.1 (04 Dec 2021 22:38)  T(F): 97 (04 Dec 2021 22:38), Max: 97 (04 Dec 2021 22:38)  HR: 91 (04 Dec 2021 22:38) (91 - 91)  BP: 144/84 (04 Dec 2021 22:38) (144/84 - 144/84)  BP(mean): --  RR: 17 (04 Dec 2021 22:38) (17 - 17)  SpO2: 100% (04 Dec 2021 22:38) (100% - 100%)
Vital Signs Last 24 Hrs  T(C): 37.6 (02 Dec 2021 20:28), Max: 37.6 (02 Dec 2021 20:28)  T(F): 99.7 (02 Dec 2021 20:28), Max: 99.7 (02 Dec 2021 20:28)  HR: 102 (02 Dec 2021 20:28) (102 - 102)  BP: 146/73 (02 Dec 2021 20:28) (146/73 - 146/73)  BP(mean): --  RR: 18 (02 Dec 2021 20:28) (18 - 18)  SpO2: 96% (02 Dec 2021 20:28) (96% - 96%)
Vital Signs Last 24 Hrs  T(C): 36.7 (02 Dec 2021 10:52), Max: 36.8 (01 Dec 2021 19:30)  T(F): 98.1 (02 Dec 2021 10:52), Max: 98.2 (01 Dec 2021 19:30)  HR: 99 (02 Dec 2021 10:52) (78 - 99)  BP: 126/77 (02 Dec 2021 10:52) (126/77 - 151/91)  BP(mean): --  RR: 17 (02 Dec 2021 06:19) (15 - 19)  SpO2: 95% (02 Dec 2021 10:52) (93% - 99%)

## 2021-12-10 NOTE — DISCHARGE NOTE NURSING/CASE MANAGEMENT/SOCIAL WORK - PATIENT PORTAL LINK FT
You can access the FollowMyHealth Patient Portal offered by [facility] by registering at the following website: [email or web address]. By joining [employer]’s FollowMyHealth portal, you will also be able to view your health information using other applications (apps) compatible with our system.

## 2021-12-10 NOTE — BH CONSULTATION LIAISON PROGRESS NOTE - NSBHPTASSESSDT_PSY_A_CORE
09-Dec-2021 12:31
06-Dec-2021 10:57
05-Dec-2021 14:01
04-Dec-2021 11:42
08-Dec-2021 15:57
10-Dec-2021 11:14
01-Dec-2021 12:11
03-Dec-2021 13:46
02-Dec-2021 12:56

## 2021-12-10 NOTE — BH CONSULTATION LIAISON PROGRESS NOTE - NSBHFUPINTERVALHXFT_PSY_A_CORE
Seen and evaluated, awake and alert, laying with his eyes closed, states his whole body hurts, states particularly his feet "since I came in this place."  He is oriented to his name, states he is in the hospital but unable to state which one, when asked year, yells "1970", states he is suicidal, wants to kill himself via choking himself.  When asked for what duration he has had these feelings, states "since I met you."  Reports his appetite is fine, has outbursts and yells during the interview, states wants writer to leave and slams his hand on the side table.    Per nursing staff, continues to have outbursts, flipped over side table this morning.

## 2021-12-10 NOTE — BH PATIENT PROFILE - HOME MEDICATIONS
melatonin 3 mg oral tablet , 1 tab(s) orally once a day (at bedtime), As needed, Insomnia  OLANZapine 10 mg oral tablet , 1 tab(s) orally once a day (at bedtime)  OLANZapine 5 mg oral tablet , 1 tab(s) orally once a day  haloperidol , 5 milligram(s) intramuscular every 6 hours, As Needed  benztropine 1 mg oral tablet , 1 tab(s) orally 2 times a day  diphenhydrAMINE 50 mg/mL injectable solution , 50 milligram(s) intramuscular every 6 hours, As Needed  thiamine 100 mg oral tablet , 1 tab(s) orally once a day  ferrous sulfate 325 mg (65 mg elemental iron) oral tablet , 1 tab(s) orally once a day  divalproex sodium 500 mg oral delayed release tablet , 1 tab(s) orally 2 times a day  acetaminophen 325 mg oral tablet , 2 tab(s) orally every 6 hours, As needed, Temp greater or equal to 38C (100.4F), Mild Pain (1 - 3)

## 2021-12-10 NOTE — BH CONSULTATION LIAISON PROGRESS NOTE - NSBHMSEMUSCLE_PSY_A_CORE
Unable to assess
Unable to assess
Normal muscle tone/strength
Normal muscle tone/strength
Unable to assess
Normal muscle tone/strength
Normal muscle tone/strength
Unable to assess
Normal muscle tone/strength

## 2021-12-10 NOTE — BH CONSULTATION LIAISON PROGRESS NOTE - NSBHASSESSMENTFT_PSY_ALL_CORE
Pt is a 50yo M, presented to [facility] earlier today and reported to have had an bottle of alcohol and presented with head laceration and after repair, asked for place to stay due to cold weather, and subsequently reportedly brought to [facility] after AMA, consult called today for reported schizophrenia. Pt presents as not oriented, with the etiology of this presentation unclear, delirium vs. intoxication vs malingering vs other factors. Seen initially by telepsychiatry overnight, seen this morning for reevaluation, is oriented x 1, can not recall why he is here or how he got here.  Can not recall the current year or how old he is, states he is 28 years old.  Patient denies SI/HI.  He reports illicit substance and alcohol use, but can not elaborate on this or quantify the amount he has been abusing.  He presents as disorganized, confused and delirious.  12/5: agitated, labile, still with CAH, pending 2PC. 12/6: remains labile, agitated, states wants to kill himself.  still pending 2PC once bed is available.  Spoke with patient's nephew to gather more history.
Pt is a 52yo M, presented to [facility] earlier today and reported to have had an bottle of alcohol and presented with head laceration and after repair, asked for place to stay due to cold weather, and subsequently reportedly brought to [facility] after AMA, consult called today for reported schizophrenia. Pt presents as not oriented, with the etiology of this presentation unclear, delirium vs. intoxication vs malingering vs other factors. Seen initially by telepsychiatry overnight, seen this morning for reevaluation, is oriented x 1, can not recall why he is here or how he got here.  Can not recall the current year or how old he is, states he is 28 years old.  Patient denies SI/HI.  He reports illicit substance and alcohol use, but can not elaborate on this or quantify the amount he has been abusing.  He presents as disorganized, confused and delirious.  12/5: agitated, labile, still with CAH, pending 2PC.
Pt is a 50yo M, presented to [facility] earlier today and reported to have had an bottle of alcohol and presented with head laceration and after repair, asked for place to stay due to cold weather, and subsequently reportedly brought to [facility] after AMA, consult called today for reported schizophrenia. Pt presents as not oriented, with the etiology of this presentation unclear, delirium vs. intoxication vs malingering vs other factors. Seen initially by telepsychiatry overnight, seen this morning for reevaluation, is oriented x 1, can not recall why he is here or how he got here.  Can not recall the current year or how old he is, states he is 28 years old.  Patient denies SI/HI.  He reports illicit substance and alcohol use, but can not elaborate on this or quantify the amount he has been abusing.  He presents as disorganized, confused and delirious.  Would recommend medical admission, AMS workup, neuro w/u, SW referral as patient is homeless.  Currently not an acute risk for harm to self as patient denies SI/HI.  Would recommend monitoring CIWA for alcohol/benzo withdrawal, high dose thiamine, MVI, folic acid supplementation.  12/1- evaluated today, more irritable, yelling, yelling profanities and racial slurs, states has +AH telling him to "kill me" as well as thoughts to harm others.  Remains disorganized.  Would continue CIWA monitoring for any alcohol/benzo withdrawal and use Ativan for increase in score, unclear what his recent alcohol intake has been.  Consider starting Zyprexa Zydis 5mg PO qHS (if qtc <500ms on ekg).  CL psych will f/u. 
Pt is a 52yo M, presented to [facility] earlier today and reported to have had an bottle of alcohol and presented with head laceration and after repair, asked for place to stay due to cold weather, and subsequently reportedly brought to [facility] after AMA, consult called today for reported schizophrenia. Pt presents as not oriented, with the etiology of this presentation unclear, delirium vs. intoxication vs malingering vs other factors. Seen initially by telepsychiatry overnight, seen this morning for reevaluation, is oriented x 1, can not recall why he is here or how he got here.  Can not recall the current year or how old he is, states he is 28 years old.  Patient denies SI/HI.  He reports illicit substance and alcohol use, but can not elaborate on this or quantify the amount he has been abusing.  He presents as disorganized, confused and delirious.  12/5: agitated, labile, still with CAH, pending 2PC. 12/6: remains labile, agitated, states wants to kill himself.  still pending 2PC once bed is available.  Spoke with patient's nephew to gather more history.
Pt is a 50yo M, presented to [facility] earlier today and reported to have had an bottle of alcohol and presented with head laceration and after repair, asked for place to stay due to cold weather, and subsequently reportedly brought to [facility] after AMA, consult called today for reported schizophrenia. Pt presents as not oriented, with the etiology of this presentation unclear, delirium vs. intoxication vs malingering vs other factors. Seen initially by telepsychiatry overnight, seen this morning for reevaluation, is oriented x 1, can not recall why he is here or how he got here.  Can not recall the current year or how old he is, states he is 28 years old.  Patient denies SI/HI.  He reports illicit substance and alcohol use, but can not elaborate on this or quantify the amount he has been abusing.  He presents as disorganized, confused and delirious.  12/5: agitated, labile, still with CAH, pending 2PC. 12/6: remains labile, agitated, states wants to kill himself.  still pending 2PC once bed is available.  Spoke with patient's nephew to gather more history.
Pt is a 50yo M, presented to [facility] earlier today and reported to have had an bottle of alcohol and presented with head laceration and after repair, asked for place to stay due to cold weather, and subsequently reportedly brought to [facility] after AMA, consult called today for reported schizophrenia. Pt presents as not oriented, with the etiology of this presentation unclear, delirium vs. intoxication vs malingering vs other factors. Seen initially by telepsychiatry overnight, seen this morning for reevaluation, is oriented x 1, can not recall why he is here or how he got here.  Can not recall the current year or how old he is, states he is 28 years old.  Patient denies SI/HI.  He reports illicit substance and alcohol use, but can not elaborate on this or quantify the amount he has been abusing.  He presents as disorganized, confused and delirious.  12/5: agitated, labile, still with CAH, pending 2PC. 12/6: remains labile, agitated, states wants to kill himself.  Spoke with patient's nephew to gather more history. 12/10- remains labile, endorsing +SI to choke himself. Still pending 2PC once bed is available.
Pt is a 50yo M, presented to [facility] earlier today and reported to have had an bottle of alcohol and presented with head laceration and after repair, asked for place to stay due to cold weather, and subsequently reportedly brought to [facility] after AMA, consult called today for reported schizophrenia. Pt presents as not oriented, with the etiology of this presentation unclear, delirium vs. intoxication vs malingering vs other factors. Seen initially by telepsychiatry overnight, seen this morning for reevaluation, is oriented x 1, can not recall why he is here or how he got here.  Can not recall the current year or how old he is, states he is 28 years old.  Patient denies SI/HI.  He reports illicit substance and alcohol use, but can not elaborate on this or quantify the amount he has been abusing.  He presents as disorganized, confused and delirious.  Would recommend medical admission, AMS workup, neuro w/u, SW referral as patient is homeless.  12/3: pt still reporting CAH to kill self and others, remains agitated and labile, on Zyprexa.  2PC in chart. 12/4: remains agitated and labile 
Pt is a 50yo M, presented to [facility] earlier today and reported to have had an bottle of alcohol and presented with head laceration and after repair, asked for place to stay due to cold weather, and subsequently reportedly brought to [facility] after AMA, consult called today for reported schizophrenia. Pt presents as not oriented, with the etiology of this presentation unclear, delirium vs. intoxication vs malingering vs other factors. Seen initially by telepsychiatry overnight, seen this morning for reevaluation, is oriented x 1, can not recall why he is here or how he got here.  Can not recall the current year or how old he is, states he is 28 years old.  Patient denies SI/HI.  He reports illicit substance and alcohol use, but can not elaborate on this or quantify the amount he has been abusing.  He presents as disorganized, confused and delirious.  Would recommend medical admission, AMS workup, neuro w/u, SW referral as patient is homeless.  12/3: pt still reporting CAH to kill self and others, remains agitated and labile, on Zyprexa.  2PC in chart.
Pt is a 52yo M, presented to [facility] earlier today and reported to have had an bottle of alcohol and presented with head laceration and after repair, asked for place to stay due to cold weather, and subsequently reportedly brought to [facility] after AMA, consult called today for reported schizophrenia. Pt presents as not oriented, with the etiology of this presentation unclear, delirium vs. intoxication vs malingering vs other factors. Seen initially by telepsychiatry overnight, seen this morning for reevaluation, is oriented x 1, can not recall why he is here or how he got here.  Can not recall the current year or how old he is, states he is 28 years old.  Patient denies SI/HI.  He reports illicit substance and alcohol use, but can not elaborate on this or quantify the amount he has been abusing.  He presents as disorganized, confused and delirious.  Would recommend medical admission, AMS workup, neuro w/u, SW referral as patient is homeless.  12/2: pt reporting CAH to kill self and others, reports feeling depressed, somewhat more linear on Zyprexa, remains agitated and labile.

## 2021-12-10 NOTE — BH CONSULTATION LIAISON PROGRESS NOTE - NSBHMSEAFFQUAL_PSY_A_CORE
Irritable
Depressed/Irritable
Irritable
Irritable
Depressed/Irritable

## 2021-12-10 NOTE — BH CONSULTATION LIAISON PROGRESS NOTE - NSBHFUPINTERVALCCFT_PSY_A_CORE
"I want to eat!"
"I hear voices."
"Fuck you"
"Fuck you"
"I want to eat!"
"I need to piss!"
"Fuck you nigger."
"1970!!"
I am fine

## 2021-12-10 NOTE — PROGRESS NOTE ADULT - PROBLEM SELECTOR PROBLEM 1
Alcohol use disorder, moderate, dependence

## 2021-12-10 NOTE — BH CONSULTATION LIAISON PROGRESS NOTE - NSBHCONSULTMEDPRNREASON_PSY_A_CORE
agitation...

## 2021-12-10 NOTE — BH CONSULTATION LIAISON PROGRESS NOTE - NSBHCONSULTRECOMMENDOTHER_PSY_A_CORE FT
Increase Zyprexa to 5mg PO daily and 10mg PO qHS    2PC psych admission pending medical clearance- legals in chart
1. Check: TSH, B12, folate, RPR, UA, consider MRI head, EEG, HIV, neuro evaluation  2. SW referral as patient is homeless  3. start Zyprexa Zydis 5mg qHS (monitor qtc <500ms on ekg)
start Depakote 500mg BID (monitor LFTs, PLTs), cont zyprexa 
Increase Zyprexa to 5mg PO daily and 7.5mg PO qHS    further collateral needed from family    2PC psych admission pending medical clearance- legals in chart
Increase Zyprexa to 5mg PO daily and 7.5mg PO qHS    further collateral needed from family    2PC psych admission pending medical clearance- legals in chart
Increase Zyprexa to 5mg PO BID    further collateral needed from family    likely psych admission pending medical clearance
cont Depakote 500mg BID (monitor LFTs, PLTs), cont zyprexa, cogentin. cont haldol prn     -please obtain updated COVID swab
cont Depakote 500mg BID (monitor LFTs, PLTs), cont zyprexa, cogentin. cont haldol prn 
start Depakote 500mg BID (monitor LFTs, PLTs)
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
## 2021-12-10 NOTE — BH CONSULTATION LIAISON PROGRESS NOTE - NSBHCONSULTMEDAGITATION_PSY_A_CORE FT
Zyprexa 2.5mg-5mg PO/IM PRN q6 for agitation (monitor qtc <500ms on ekg) DO NOT ADMINISTER IM/IV ATIVAN WITHIN 4 HOURS OF GIVING IM ZYPREXA
PRN: Haldol 5mg PO/IM/IV q6h PRN agitation, Benadryl 50mg PO/IM/IV q6h PRN EPS ppx, Ativan 2mg PO/IM/IV q6h PRN agitation
PRN: Haldol 5mg PO/IM/IV q6h PRN agitation, Benadryl 50mg PO/IM/IV q6h PRN EPS ppx, Ativan 2mg PO/IM/IV q6h PRN agitation
Zyprexa 2.5mg-5mg PO/IM PRN q6 for agitation (monitor qtc <500ms on ekg) DO NOT ADMINISTER IM/IV ATIVAN WITHIN 4 HOURS OF GIVING IM ZYPREXA
PRN: Haldol 5mg PO/IM/IV q6h PRN agitation, Benadryl 50mg PO/IM/IV q6h PRN EPS ppx, Ativan 2mg PO/IM/IV q6h PRN agitation
Zyprexa 2.5mg-5mg PO/IM PRN q6 for agitation (monitor qtc <500ms on ekg) DO NOT ADMINISTER IM/IV ATIVAN WITHIN 4 HOURS OF GIVING IM ZYPREXA
Zyprexa 2.5mg-5mg PO/IM PRN q6 for agitation (monitor qtc <500ms on ekg) DO NOT ADMINISTER IM/IV ATIVAN WITHIN 4 HOURS OF GIVING IM ZYPREXA
PRN: Haldol 5mg PO/IM/IV q6h PRN agitation, Benadryl 50mg PO/IM/IV q6h PRN EPS ppx, Ativan 2mg PO/IM/IV q6h PRN agitation
PRN: Haldol 5mg PO/IM/IV q6h PRN agitation, Benadryl 50mg PO/IM/IV q6h PRN EPS ppx, Ativan 2mg PO/IM/IV q6h PRN agitation

## 2021-12-10 NOTE — PROGRESS NOTE ADULT - PROBLEM SELECTOR PLAN 4
COVID-19+ on admission. Not endorsing SOB. SpO2 97% on RA. Unclear when sxs began, as patient poor historian.  - Monitor SpO2 and provide O2 support PRN
COVID-19+ on admission. Not endorsing SOB. SpO2 97% on RA. Unclear when sxs began, as patient poor historian.  - Monitor SpO2 and provide O2 support PRN  - CTH showed severe R maxillary sinus thickening and mucosal thickening ethmoid air cells and R frontal sinus
COVID-19+ on admission. Not endorsing SOB. SpO2 97% on RA. Unclear when sxs began, as patient poor historian.  - Monitor SpO2 and provide O2 support PRN
COVID-19+ on admission. Not endorsing SOB. SpO2 97% on RA. Unclear when sxs began, as patient poor historian.  - Monitor SpO2 and provide O2 support PRN  - CTH showed severe R maxillary sinus thickening and mucosal thickening ethmoid air cells and R frontal sinus
COVID-19+ on admission. Not endorsing SOB. SpO2 97% on RA. Unclear when sxs began, as patient poor historian.  - Monitor SpO2 and provide O2 support PRN
COVID-19+ on admission. Not endorsing SOB. SpO2 97% on RA. Unclear when sxs began, as patient poor historian.  - Monitor SpO2 and provide O2 support PRN  - CTH showed severe R maxillary sinus thickening and mucosal thickening ethmoid air cells and R frontal sinus
COVID-19+ on admission. Not endorsing SOB. SpO2 97% on RA. Unclear when sxs began, as patient poor historian.  Psych requesting repeat covid swab today

## 2021-12-10 NOTE — BH CONSULTATION LIAISON PROGRESS NOTE - NSBHADMITIPOBS_PSY_A_CORE
Constant observation
no
Constant observation

## 2021-12-10 NOTE — BH CONSULTATION LIAISON PROGRESS NOTE - NSBHPSYCHOLCOGABN_PSY_A_CORE
disoriented to time/disoriented to place/disoriented to situation
disoriented to time
disoriented to time/disoriented to place/disoriented to situation

## 2021-12-10 NOTE — BH CONSULTATION LIAISON PROGRESS NOTE - NSBHMSEAFFRANGE_PSY_A_CORE
Constricted
Labile/Blunted
Labile/Blunted
Constricted
Labile
Labile/Blunted
Constricted

## 2021-12-10 NOTE — BH CONSULTATION LIAISON PROGRESS NOTE - NSBHCHARTREVIEWLAB_PSY_A_CORE FT
9.7    9.67  )-----------( 496      ( 01 Dec 2021 06:50 )             33.0     12-01    138  |  103  |  11  ----------------------------<  84  4.2   |  23  |  0.62    Ca    9.2      01 Dec 2021 06:57  Phos  4.1     12-01  Mg     1.9     12-01    TPro  6.8  /  Alb  3.7  /  TBili  0.2  /  DBili  x   /  AST  13  /  ALT  13  /  AlkPhos  78  12-01    
  12-08    141  |  106  |  12  ----------------------------<  80  4.4   |  23  |  0.50    Ca    8.8      08 Dec 2021 11:55  Mg     2.0     12-08    TPro  7.0  /  Alb  3.7  /  TBili  0.3  /  DBili  x   /  AST  20  /  ALT  18  /  AlkPhos  78  12-08  
    Phos  3.0     12-03  Mg     1.8     12-03      
                      10.1   8.50  )-----------( 332      ( 09 Dec 2021 09:45 )             34.3     12-09    139  |  103  |  14  ----------------------------<  101<H>  4.0   |  24  |  0.52    Ca    8.8      09 Dec 2021 09:45  Mg     1.8     12-09    TPro  6.6  /  Alb  3.5  /  TBili  0.2  /  DBili  x   /  AST  14  /  ALT  17  /  AlkPhos  73  12-09    
                      9.9    9.25  )-----------( 526      ( 02 Dec 2021 05:00 )             33.2     12-    139  |  105  |  12  ----------------------------<  111<H>  4.2   |  23  |  0.64    Ca    8.7      02 Dec 2021 05:00  Phos  4.6     12  Mg     1.9         TPro  6.8  /  Alb  3.7  /  TBili  0.2  /  DBili  x   /  AST  13  /  ALT  13  /  AlkPhos  78  12    Urinalysis Basic - ( 01 Dec 2021 15:30 )    Color: Light Yellow / Appearance: Clear / S.012 / pH: x  Gluc: x / Ketone: Negative  / Bili: Negative / Urobili: Negative   Blood: x / Protein: Negative / Nitrite: Negative   Leuk Esterase: Negative / RBC: 0 /hpf / WBC 0 /HPF   Sq Epi: x / Non Sq Epi: 0 /hpf / Bacteria: Negative    
                      9.9    9.25  )-----------( 526      ( 02 Dec 2021 05:00 )             33.2     12-02    139  |  105  |  12  ----------------------------<  111<H>  4.2   |  23  |  0.64    Ca    8.7      02 Dec 2021 05:00  Phos  4.6     1202  Mg     1.9     1202      Urinalysis Basic - ( 01 Dec 2021 15:30 )    Color: Light Yellow / Appearance: Clear / S.012 / pH: x  Gluc: x / Ketone: Negative  / Bili: Negative / Urobili: Negative   Blood: x / Protein: Negative / Nitrite: Negative   Leuk Esterase: Negative / RBC: 0 /hpf / WBC 0 /HPF   Sq Epi: x / Non Sq Epi: 0 /hpf / Bacteria: Negative    
                      10.1   8.50  )-----------( 332      ( 09 Dec 2021 09:45 )             34.3     12-09    139  |  103  |  14  ----------------------------<  101<H>  4.0   |  24  |  0.52    Ca    8.8      09 Dec 2021 09:45  Mg     1.8     12-09    TPro  6.6  /  Alb  3.5  /  TBili  0.2  /  DBili  x   /  AST  14  /  ALT  17  /  AlkPhos  73  12-09  
                      9.9    9.25  )-----------( 526      ( 02 Dec 2021 05:00 )             33.2     12-02    139  |  105  |  12  ----------------------------<  111<H>  4.2   |  23  |  0.64    Ca    8.7      02 Dec 2021 05:00  Phos  4.6     1202  Mg     1.9     1202      Urinalysis Basic - ( 01 Dec 2021 15:30 )    Color: Light Yellow / Appearance: Clear / S.012 / pH: x  Gluc: x / Ketone: Negative  / Bili: Negative / Urobili: Negative   Blood: x / Protein: Negative / Nitrite: Negative   Leuk Esterase: Negative / RBC: 0 /hpf / WBC 0 /HPF   Sq Epi: x / Non Sq Epi: 0 /hpf / Bacteria: Negative

## 2021-12-10 NOTE — BH CONSULTATION LIAISON PROGRESS NOTE - NSBHATTESTSEENBY_PSY_A_CORE
attending Psychiatrist without NP/Trainee
Attending Psychiatrist supervising NP/Trainee, meeting pt...
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
## 2021-12-10 NOTE — BH CONSULTATION LIAISON PROGRESS NOTE - NSICDXBHPRIMARYDX_PSY_ALL_CORE
Delirium due to another medical condition   F05  

## 2021-12-11 DIAGNOSIS — F10.10 ALCOHOL ABUSE, UNCOMPLICATED: ICD-10-CM

## 2021-12-11 PROCEDURE — 99222 1ST HOSP IP/OBS MODERATE 55: CPT

## 2021-12-11 RX ADMIN — Medication 2 MILLIGRAM(S): at 23:28

## 2021-12-11 RX ADMIN — Medication 1 MILLIGRAM(S): at 08:13

## 2021-12-11 RX ADMIN — OLANZAPINE 10 MILLIGRAM(S): 15 TABLET, FILM COATED ORAL at 20:19

## 2021-12-11 RX ADMIN — Medication 100 MILLIGRAM(S): at 08:13

## 2021-12-11 RX ADMIN — DIVALPROEX SODIUM 500 MILLIGRAM(S): 500 TABLET, DELAYED RELEASE ORAL at 20:19

## 2021-12-11 RX ADMIN — Medication 3 MILLIGRAM(S): at 20:19

## 2021-12-11 RX ADMIN — DIVALPROEX SODIUM 500 MILLIGRAM(S): 500 TABLET, DELAYED RELEASE ORAL at 08:13

## 2021-12-11 RX ADMIN — Medication 1 MILLIGRAM(S): at 20:19

## 2021-12-11 RX ADMIN — Medication 2 MILLIGRAM(S): at 17:29

## 2021-12-11 RX ADMIN — OLANZAPINE 5 MILLIGRAM(S): 15 TABLET, FILM COATED ORAL at 08:13

## 2021-12-11 RX ADMIN — Medication 325 MILLIGRAM(S): at 08:12

## 2021-12-11 NOTE — CONSULT NOTE ADULT - CONSULT REQUESTED BY NAME
11/29/2021               Robby Fox  [address]  [facility] [address]        Dear Edcouch (MR#[id number])    As we have been unable to contact you by phone, this letter is sent in regards to your recent visit to the [facility] Emergency Department on 11/26/2021. During the visit, tests were performed to assist the physician in a medical diagnosis. A review of those tests requires that we notify you of the following:    Your wound culture and sensitivity was POSITIVE for bacteria called Morganella morganii, methicillin-resistant Staphylococcus aureus, and Streptococcus pyogenes, and further treatment may be necessary. The currently prescribed antibiotic (sulfamethoxazole-trimethoprim and cephalexin) may not be effective in treating your infection, and you may need treatment with IV antibiotics. IF YOU ARE NOT FEELING BETTER PLEASE CONTACT ME AS SOON AS POSSIBLE AT THE NUMBER BELOW.       Thank you for your cooperation in the matter.    Sincerely,  ED Culture Follow-Up Staff  Celia Hogue, PharmD    [facility], Emergency Department  [address] [place] [id number] [phone] (Celia's phone number)  [phone] (ED Culture Line)                
Dr. Sagastume

## 2021-12-11 NOTE — BH INPATIENT PSYCHIATRY ASSESSMENT NOTE - NSBHCHARTREVIEWVS_PSY_A_CORE FT
Vital Signs Last 24 Hrs  T(C): 36.7 (12-11-21 @ 08:05), Max: 36.9 (12-10-21 @ 22:50)  T(F): 98.1 (12-11-21 @ 08:05), Max: 98.4 (12-10-21 @ 22:50)  HR: 86 (12-10-21 @ 20:42) (86 - 92)  BP: 132/79 (12-10-21 @ 20:42) (127/75 - 132/79)  BP(mean): --  RR: 17 (12-10-21 @ 20:42) (17 - 18)  SpO2: 96% (12-10-21 @ 20:42) (96% - 97%)    Orthostatic VS  12-11-21 @ 08:05  Lying BP: --/-- HR: --  Sitting BP: 136/86 HR: 107  Standing BP: --/-- HR: --  Site: --  Mode: --  Orthostatic VS  12-10-21 @ 22:50  Lying BP: --/-- HR: --  Sitting BP: 120/85 HR: 89  Standing BP: 135/82 HR: 93  Site: --  Mode: --

## 2021-12-11 NOTE — CONSULT NOTE ADULT - SUBJECTIVE AND OBJECTIVE BOX
HPI:     50 y/o M with PMH EtOH abuse who presents to [facility] with AMS and found to be in EtOH withdrawal with likely behavioral disturbance for schizophrenia. Now admitted to [facility] for continued management. While at [facility], he completed an ativan taper and is now currently not in withdrawal. Today, he reports full body pain ever since arriving at [facility]. He says he thinks a glass of water will help. Otherwise he denies HA, chest pain, SOB, coughing, nausea/vomiting, abdominal pain, diarrhea/constipation.     PAST MEDICAL & SURGICAL HISTORY: EtOH abuse      Review of Systems:   CONSTITUTIONAL: No fever, weight loss, or fatigue  EYES: No eye pain, visual disturbances, or discharge  ENMT:  No difficulty hearing, tinnitus, vertigo; No sinus or throat pain  NECK: No pain or stiffness  RESPIRATORY: No cough, wheezing, chills or hemoptysis; No shortness of breath  CARDIOVASCULAR: No chest pain, palpitations, dizziness, or leg swelling  GASTROINTESTINAL: No abdominal or epigastric pain. No nausea, vomiting, or hematemesis; No diarrhea or constipation. No melena or hematochezia.  GENITOURINARY: No dysuria, frequency, hematuria, or incontinence  NEUROLOGICAL: No headaches, memory loss, loss of strength, numbness, or tremors  SKIN: No itching, burning, rashes, or lesions   LYMPH NODES: No enlarged glands  ENDOCRINE: No heat or cold intolerance; No hair loss  MUSCULOSKELETAL: No joint pain or swelling; No muscle, back, or extremity pain  HEME/LYMPH: No easy bruising, or bleeding gums  ALLERY AND IMMUNOLOGIC: No hives or eczema    Allergies    No Known Allergies    Intolerances        Social History: EtOH use. When asked about other substances and tobacco use he says "he does everything"     FAMILY HISTORY: Non-contributory.       MEDICATIONS  (STANDING):  benztropine 1 milliGRAM(s) Oral two times a day  diVALproex  milliGRAM(s) Oral two times a day  ferrous    sulfate 325 milliGRAM(s) Oral daily  melatonin. 3 milliGRAM(s) Oral at bedtime  OLANZapine 10 milliGRAM(s) Oral at bedtime  OLANZapine 5 milliGRAM(s) Oral daily  thiamine 100 milliGRAM(s) Oral daily    MEDICATIONS  (PRN):  diphenhydrAMINE Injectable 50 milliGRAM(s) IntraMuscular once PRN agitation  LORazepam     Tablet 2 milliGRAM(s) Oral every 6 hours PRN agitation  LORazepam   Injectable 2 milliGRAM(s) IntraMuscular once PRN agitation  OLANZapine 5 milliGRAM(s) Oral every 6 hours PRN agitation  OLANZapine Injectable 5 milliGRAM(s) IntraMuscular once PRN agitation      Vital Signs Last 24 Hrs  T(C): 36.7 (11 Dec 2021 08:05), Max: 36.9 (10 Dec 2021 22:50)  T(F): 98.1 (11 Dec 2021 08:05), Max: 98.4 (10 Dec 2021 22:50)  HR: 86 (10 Dec 2021 20:42) (86 - 92)  BP: 132/79 (10 Dec 2021 20:42) (127/75 - 132/79)  BP(mean): --  RR: 17 (10 Dec 2021 20:42) (17 - 18)  SpO2: 96% (10 Dec 2021 20:42) (96% - 97%)  CAPILLARY BLOOD GLUCOSE            PHYSICAL EXAM:  GENERAL: NAD, well-developed  HEAD:  Atraumatic, Normocephalic  EYES: EOMI, conjunctiva and sclera clear  NECK: Supple, No JVD  CHEST/LUNG: Clear to auscultation bilaterally; No wheeze  HEART: Regular rate and rhythm; No murmurs, rubs, or gallops  ABDOMEN: Soft, Nontender, Nondistended; Bowel sounds present  EXTREMITIES:  2+ Peripheral Pulses, No clubbing, cyanosis, or edema  NEUROLOGY: non-focal  SKIN: No rashes or lesions    LABS:                    EKG(personally reviewed):    RADIOLOGY & ADDITIONAL TESTS:    Imaging Personally Reviewed:    Consultant(s) Notes Reviewed:      Care Discussed with Consultants/Other Providers:

## 2021-12-11 NOTE — BH INPATIENT PSYCHIATRY ASSESSMENT NOTE - NSBHASSESSSUMMFT_PSY_ALL_CORE
Plan:  >Legal: 9.27  >Obs: Routine, no current SI. no need for CO.    >Psychiatric Meds: Continue medication regimen. Observe for tolerability and efficacy.  Depakote 500 BID  Cogentin 1mg BID  Zyprexa 5mg qAM; 10mg qHS  thiamine 100mg daily  ferrous sulfate 325 mg daily  melatonin 3mg nightly  PRN medications:  Ativan 2mg oral Q6HR PRN for agitation and anxiety.  Haldol 5mg oral Q6HR PRN for agitation.   Benadryl 50mg oral Q6HR PRN for agitation.   Vistaril 50mg oral Q6HR PRN for anxiety.  >Labs: Admission labs reviewed.  Labs pending for tomorrow: A1c and Lipid panel. QT/QTc:376/464ms.  Hold antipsychotics if QTc >500  >Medical:   No acute concerns. No consultations needed at this time.  During the course of treatment, will collaborate with medical team to manage medical issues.  >Diet: Regular  >Social: milieu/structured therapy  >Treatment Interventions: Groups and Individual Therapy/CBT, Motivational counseling for substance abuse related issues.   >Dispo: Collateral and dispo planning pending further symptom and medication optimization.

## 2021-12-11 NOTE — PSYCHIATRIC REHAB INITIAL EVALUATION - NSBHPRRECOMMEND_PSY_ALL_CORE
Writer met with Pt. to orient him to psychiatric rehabilitation staff and services. Pt. is a 51 y.o, male who is presenting with irritation, disorganization and sexual preoccupation. Throughout the session pt. withheld information and kept repeating “I Don’t know who I am”. Pt. listened when writer was explaining how the unit operates and then pt. began to mumble to himself and writer asked him to reiterate what he had said and pt. began to yell “I already told you everything” and writer went to end the session, the pt. kept repeating an apology for yelling. Pt. seems to be easily irritated and challenging to redirect.   According to ED reports and intake report pt. is sexually inappropriate, hearing voices, and displaying signs of schizophrenia. Pt. was unwilling in all the assessments to answer the full interview questions. Psychiatric rehabilitation staff was unable to establish a collaborative treatment goal to work on over the next 7 days so annier established one for him.   Psychiatric rehabilitation staff will encourage exploration of these resources and provide the necessary information

## 2021-12-11 NOTE — BH INPATIENT PSYCHIATRY ASSESSMENT NOTE - HPI (INCLUDE ILLNESS QUALITY, SEVERITY, DURATION, TIMING, CONTEXT, MODIFYING FACTORS, ASSOCIATED SIGNS AND SYMPTOMS)
Patient was seen and evaluated, chart reviewed. Case discussed with nursing team.  On service for this 52yo male, patient unable to elaborate on PPHx or PMHx, presented with alcohol use.   Patient is hospitalized with a primary problem of disorganization, agitation possible in context of alcohol use.  Patient admitted to [facility] on a 9.27 legal status. I have reviewed the initial psychiatric assessment in the electronic medical record, including the history of present illness, past psychiatric history, family/social history (no pertinent changes), and exam, and have confirmed the salient findings dated 12/10/21.  As per chart review, transferring records indicated the following:  Pt is a 52yo M, presented to [facility] earlier today and reported to have had an bottle of alcohol and presented with head laceration and after repair, asked for place to stay due to cold weather, and subsequently reportedly brought to [facility] after AMA, consult called today for reported schizophrenia. On assessment, pt unable to meaningfully interact, reports name is 'Adam Franklin', or other name, reports he is at [place], reports he drank 'everything', unable to answer further questions. Pt unable to follow directions to sit up in bed, cover his genitals with gown or sheet.    On unit:  Information Received From: Chart review and patient interview  Initial interview, chart, medications and admission labs reviewed, with no acute findings, +u-tox benzo.  Patient is discussed with nursing staff. Per nursing report patient remains compliant with all standing medications and tolerating well. Patient remains in good behavioral control, there has been no aggression, no prns for aggression.  No significant overnight issues.  Patient is observed in room, he  is notably irritable, bizarre upon approach. When questioned about his mood pt repeatedly mumbles something under his breath, writer asked  patient to repeat what he said, pt states “I’m saying dumb shit”  Patient states voices are “talking to me.”  Patient is asked the content of the voices, pt replies “voices tell me to fuck you”  “I want to fuck”  Patient then started to touch himself under the blankets.  Patient repeatedly stating explicit statements, each time pitch/volume in voice increased.  Patient then stated he wants to go to bathroom.   Interview was challenging/limited due to patient’s aggression,  disorganization, and sexual inappropriateness.    Patient did report AH in ED.  He appears internally disturbed, paranoia, delusions. Patient denies any symptoms suggestive of rodrigo: (denied grandiosity/ racing thoughts/ increased goal directed activities or engaged in risk taking behavior/ no pressured speech/ no elevated mood/ denied any increased in energy level causing sleep disruption). Denies depressed mood or SI/HI. Denies trauma history, denies any current legal issues.     The following Diagnoses are based on currently available information and may change as additional information  Psychiatric Diagnosis: Alcohol Use  Differential:  Substance induced Psychosis     Schizoaffective Disorder  Schizophrenia Disorder  Bipolar disorder with psychotic features  Alcohol related disorders | Other psychoactive substance related disorders   Psychosis secondary to substance use     Patient was seen and evaluated, chart reviewed. Case discussed with nursing team.  On service for this 50yo male, patient unable to elaborate on PPHx or PMHx, presented with alcohol use.   Patient is hospitalized with a primary problem of disorganization, agitation possible in context of alcohol use.  Patient admitted to [facility] on a 9.27 legal status. I have reviewed the initial psychiatric assessment in the electronic medical record, including the history of present illness, past psychiatric history, family/social history (no pertinent changes), and exam, and have confirmed the salient findings dated 12/10/21.  As per chart review, transferring records indicated the following:  Pt is a 50yo M, presented to [facility] earlier today and reported to have had an bottle of alcohol and presented with head laceration and after repair, asked for place to stay due to cold weather, and subsequently reportedly brought to [facility] after AMA, consult called today for reported schizophrenia. On assessment, pt unable to meaningfully interact, reports name is 'Adam Franklin', or other name, reports he is at [place], reports he drank 'everything', unable to answer further questions. Pt unable to follow directions to sit up in bed, cover his genitals with gown or sheet.    On unit:  Information Received From: Chart review and patient interview  Initial interview, chart, medications and admission labs reviewed, with no acute findings, +u-tox benzo.  Patient is discussed with nursing staff. Per nursing report patient remains compliant with all standing medications and tolerating well. Patient remains in good behavioral control, there has been no aggression, no prns for aggression.  No significant overnight issues.  Patient is observed in room, he  is notably irritable, bizarre upon approach. When questioned about his mood pt repeatedly mumbles something under his breath, writer asked  patient to repeat what he said, pt states “I’m saying dumb shit”  Patient states voices are “talking to me.”  Patient is asked the content of the voices, pt replies “voices tell me to fuck you”  “I want to fuck”  Patient then started to touch himself under the blankets.  Patient repeatedly stating explicit statements, each time pitch/volume in voice increased.  Patient then stated he wants to go to bathroom.   Interview was challenging/limited due to patient’s aggression,  disorganization, and sexual inappropriateness.    Patient did report AH in ED.  He appears internally disturbed, paranoia, delusions. Patient denies any symptoms suggestive of rodrigo: (denied grandiosity/ racing thoughts/ increased goal directed activities or engaged in risk taking behavior/ no pressured speech/ no elevated mood/ denied any increased in energy level causing sleep disruption). Denies depressed mood or SI/HI. Denies trauma history, denies any current legal issues. History of ETOH, completed an ativan taper prior to admission at [facility], and is now currently not in withdrawal.    The following Diagnoses are based on currently available information and may change as additional information  Psychiatric Diagnosis: Alcohol Use  Differential:  Substance induced Psychosis     Schizoaffective Disorder  Schizophrenia Disorder  Bipolar disorder with psychotic features  Alcohol related disorders | Other psychoactive substance related disorders   Psychosis secondary to substance use

## 2021-12-11 NOTE — BH INPATIENT PSYCHIATRY ASSESSMENT NOTE - CURRENT MEDICATION
MEDICATIONS  (STANDING):  benztropine 1 milliGRAM(s) Oral two times a day  diVALproex  milliGRAM(s) Oral two times a day  ferrous    sulfate 325 milliGRAM(s) Oral daily  melatonin. 3 milliGRAM(s) Oral at bedtime  OLANZapine 10 milliGRAM(s) Oral at bedtime  OLANZapine 5 milliGRAM(s) Oral daily  thiamine 100 milliGRAM(s) Oral daily    MEDICATIONS  (PRN):  diphenhydrAMINE Injectable 50 milliGRAM(s) IntraMuscular once PRN agitation  LORazepam     Tablet 2 milliGRAM(s) Oral every 6 hours PRN agitation  LORazepam   Injectable 2 milliGRAM(s) IntraMuscular once PRN agitation  OLANZapine 5 milliGRAM(s) Oral every 6 hours PRN agitation  OLANZapine Injectable 5 milliGRAM(s) IntraMuscular once PRN agitation

## 2021-12-11 NOTE — CONSULT NOTE ADULT - ASSESSMENT
50 y/o M with PMH EtOH abuse who presents to [facility] with AMS and found to be in EtOH withdrawal with likely behavioral disturbance for schizophrenia.    #EtOH abuse/withdrawal,   - He has completed the ativan taper, no signs/symptoms of withdrawal.   - Remainder of plan as per primary team     #Anemia,   - Hgb 10.1. Stable. No need to trend while inpatient at [facility].     #Schizophrenia,   - Care as per primary team.  50 y/o M with PMH EtOH abuse who presents to [facility] with AMS and found to be in EtOH withdrawal with likely behavioral disturbance for schizophrenia.    #EtOH abuse/withdrawal,   - He has completed the ativan taper, no signs/symptoms of withdrawal.   - Remainder of plan as per primary team     #Anemia,   - Hgb 10.1. Stable. No need to trend while inpatient at [facility].     #Schizophrenia,   - Care as per primary team.     #COVID   - Asymptomatic and no longer requires isolation

## 2021-12-12 LAB — VIT B2 SERPL-MCNC: 226 UG/L — SIGNIFICANT CHANGE UP (ref 137–370)

## 2021-12-12 PROCEDURE — 99232 SBSQ HOSP IP/OBS MODERATE 35: CPT

## 2021-12-12 RX ADMIN — OLANZAPINE 10 MILLIGRAM(S): 15 TABLET, FILM COATED ORAL at 21:03

## 2021-12-12 RX ADMIN — DIVALPROEX SODIUM 500 MILLIGRAM(S): 500 TABLET, DELAYED RELEASE ORAL at 21:04

## 2021-12-12 RX ADMIN — Medication 2 MILLIGRAM(S): at 10:55

## 2021-12-12 RX ADMIN — Medication 1 MILLIGRAM(S): at 08:49

## 2021-12-12 RX ADMIN — Medication 1 MILLIGRAM(S): at 21:04

## 2021-12-12 RX ADMIN — Medication 2 MILLIGRAM(S): at 16:03

## 2021-12-12 RX ADMIN — OLANZAPINE 5 MILLIGRAM(S): 15 TABLET, FILM COATED ORAL at 08:50

## 2021-12-12 RX ADMIN — Medication 325 MILLIGRAM(S): at 08:49

## 2021-12-12 RX ADMIN — DIVALPROEX SODIUM 500 MILLIGRAM(S): 500 TABLET, DELAYED RELEASE ORAL at 08:50

## 2021-12-12 RX ADMIN — Medication 3 MILLIGRAM(S): at 21:03

## 2021-12-12 RX ADMIN — Medication 100 MILLIGRAM(S): at 08:50

## 2021-12-12 NOTE — BH INPATIENT PSYCHIATRY PROGRESS NOTE - CURRENT MEDICATION
MEDICATIONS  (STANDING):  benztropine 1 milliGRAM(s) Oral two times a day  diVALproex  milliGRAM(s) Oral two times a day  ferrous    sulfate 325 milliGRAM(s) Oral daily  melatonin. 3 milliGRAM(s) Oral at bedtime  OLANZapine 10 milliGRAM(s) Oral at bedtime  OLANZapine 5 milliGRAM(s) Oral daily  thiamine 100 milliGRAM(s) Oral daily    MEDICATIONS  (PRN):  diphenhydrAMINE Injectable 50 milliGRAM(s) IntraMuscular once PRN agitation  LORazepam     Tablet 2 milliGRAM(s) Oral every 2 hours PRN CIWA score increase by 2 points and current CIWA score GREATER THAN 9  LORazepam     Tablet 2 milliGRAM(s) Oral every 6 hours PRN agitation  LORazepam   Injectable 2 milliGRAM(s) IntraMuscular once PRN agitation  LORazepam   Injectable 2 milliGRAM(s) IntraMuscular every 2 hours PRN CIWA score increase by 2 points and current CIWA score GREATER THAN 9  OLANZapine 5 milliGRAM(s) Oral every 6 hours PRN agitation  OLANZapine Injectable 5 milliGRAM(s) IntraMuscular once PRN agitation

## 2021-12-12 NOTE — BH INPATIENT PSYCHIATRY PROGRESS NOTE - PRN MEDS
MEDICATIONS  (PRN):  diphenhydrAMINE Injectable 50 milliGRAM(s) IntraMuscular once PRN agitation  LORazepam     Tablet 2 milliGRAM(s) Oral every 2 hours PRN CIWA score increase by 2 points and current CIWA score GREATER THAN 9  LORazepam     Tablet 2 milliGRAM(s) Oral every 6 hours PRN agitation  LORazepam   Injectable 2 milliGRAM(s) IntraMuscular once PRN agitation  LORazepam   Injectable 2 milliGRAM(s) IntraMuscular every 2 hours PRN CIWA score increase by 2 points and current CIWA score GREATER THAN 9  OLANZapine 5 milliGRAM(s) Oral every 6 hours PRN agitation  OLANZapine Injectable 5 milliGRAM(s) IntraMuscular once PRN agitation

## 2021-12-12 NOTE — BH INPATIENT PSYCHIATRY PROGRESS NOTE - NSBHFUPINTERVALHXFT_PSY_A_CORE
Chart, medications and labs reviewed, with no acute findings.  Patient is discussed with nursing staff. Per nursing BROOKS was called yesterday evening due to  concerns for alcohol withdrawal symptoms, including tremulousness and diaphoresis. Pt received 1x dose of Ativan 2 mg due to pt's hx of alcohol use. Of note, pt recently completed Ativan taper prior to admission. Symptom-triggered CIWA started. Per nursing CIWA last night was 4, today 9, received Ativan 2mg prn.  Pt reported sx of confusion, trembling, sweating, denies N/V. Discussed with nursing staff to monitor for alcohol delirium.    Per nursing report patient remains compliant with all standing medications and tolerating well. Patient remains in fair behavioral control, there has been no aggression, no prns for aggression.  Patient is observed at nurses station , he presents confused, unsteady gait. pt repeatedly mumbling to self.   Patient reports +AH  “voices all around me.”  He appears internally disturbed.

## 2021-12-12 NOTE — BH INPATIENT PSYCHIATRY PROGRESS NOTE - NSBHCHARTREVIEWVS_PSY_A_CORE FT
Vital Signs Last 24 Hrs  T(C): 36.8 (12-11-21 @ 23:28), Max: 37.3 (12-11-21 @ 19:55)  T(F): 98.2 (12-11-21 @ 23:28), Max: 99.2 (12-11-21 @ 19:55)  HR: --  BP: --  BP(mean): --  RR: --  SpO2: --    Orthostatic VS  12-11-21 @ 23:28  Lying BP: --/-- HR: --  Sitting BP: 103/86 HR: 102  Standing BP: --/-- HR: --  Site: upper left arm  Mode: electronic  Orthostatic VS  12-11-21 @ 08:05  Lying BP: --/-- HR: --  Sitting BP: 136/86 HR: 107  Standing BP: --/-- HR: --  Site: --  Mode: --  Orthostatic VS  12-10-21 @ 22:50  Lying BP: --/-- HR: --  Sitting BP: 120/85 HR: 89  Standing BP: 135/82 HR: 93  Site: --  Mode: --

## 2021-12-13 PROCEDURE — 99232 SBSQ HOSP IP/OBS MODERATE 35: CPT

## 2021-12-13 RX ORDER — HALOPERIDOL DECANOATE 100 MG/ML
5 INJECTION INTRAMUSCULAR ONCE
Refills: 0 | Status: DISCONTINUED | OUTPATIENT
Start: 2021-12-13 | End: 2022-02-15

## 2021-12-13 RX ORDER — OLANZAPINE 15 MG/1
10 TABLET, FILM COATED ORAL
Refills: 0 | Status: DISCONTINUED | OUTPATIENT
Start: 2021-12-13 | End: 2021-12-16

## 2021-12-13 RX ORDER — CLONAZEPAM 1 MG
1 TABLET ORAL
Refills: 0 | Status: DISCONTINUED | OUTPATIENT
Start: 2021-12-13 | End: 2021-12-14

## 2021-12-13 RX ADMIN — OLANZAPINE 5 MILLIGRAM(S): 15 TABLET, FILM COATED ORAL at 09:09

## 2021-12-13 RX ADMIN — Medication 1 MILLIGRAM(S): at 09:09

## 2021-12-13 RX ADMIN — Medication 100 MILLIGRAM(S): at 09:09

## 2021-12-13 RX ADMIN — DIVALPROEX SODIUM 500 MILLIGRAM(S): 500 TABLET, DELAYED RELEASE ORAL at 09:09

## 2021-12-13 RX ADMIN — Medication 1 MILLIGRAM(S): at 20:05

## 2021-12-13 RX ADMIN — OLANZAPINE 10 MILLIGRAM(S): 15 TABLET, FILM COATED ORAL at 20:05

## 2021-12-13 RX ADMIN — DIVALPROEX SODIUM 500 MILLIGRAM(S): 500 TABLET, DELAYED RELEASE ORAL at 20:04

## 2021-12-13 RX ADMIN — Medication 325 MILLIGRAM(S): at 09:09

## 2021-12-13 NOTE — BH INPATIENT PSYCHIATRY PROGRESS NOTE - CURRENT MEDICATION
MEDICATIONS  (STANDING):  clonazePAM  Tablet 1 milliGRAM(s) Oral two times a day  diVALproex  milliGRAM(s) Oral two times a day  ferrous    sulfate 325 milliGRAM(s) Oral daily  OLANZapine Disintegrating Tablet 10 milliGRAM(s) Oral two times a day  thiamine 100 milliGRAM(s) Oral daily    MEDICATIONS  (PRN):  diphenhydrAMINE Injectable 50 milliGRAM(s) IntraMuscular once PRN agitation  haloperidol    Injectable 5 milliGRAM(s) IntraMuscular once PRN severe agitation  LORazepam     Tablet 2 milliGRAM(s) Oral every 6 hours PRN agitation  LORazepam   Injectable 2 milliGRAM(s) IntraMuscular once PRN agitation  OLANZapine 5 milliGRAM(s) Oral every 6 hours PRN agitation

## 2021-12-13 NOTE — BH INPATIENT PSYCHIATRY PROGRESS NOTE - NSBHFUPINTERVALHXFT_PSY_A_CORE
Over the weekend pt remained very agitated, uncooperative, and inappropriate, given PO prns on several occasions. Remains difficult to evaluate due to agitation. This morning upon approach pt started screaming at writer that staff were "killing" him. Then started cursing at writer and screamed "get out you whore." Interview terminated early due to agitated and threatening behavior. Per staff there was some concern for ongoing EtOH withdrawal and pt was placed on CIWA protocol. Pt has been isolative to bedroom. Noted to be disheveled. Has been compliant with meds given to him.     Per pt's nephew pt dx with schizophrenia and has been on many meds in the past. States pt has been homeless for at least one year and since that time stopped taking his meds.     Per pt's last known pharmacy pt last filled prescriptions in Jan 2021 for Abilify, Depakote, Gabapentin, Effexor, Atarax, Metformin, and Losartan.

## 2021-12-13 NOTE — BH INPATIENT PSYCHIATRY PROGRESS NOTE - NSBHMETABOLIC_PSY_ALL_CORE_FT
BMI:   HbA1c:   Glucose: POCT Blood Glucose.: 95 mg/dL (11-30-21 @ 05:53)    BP: 132/79 (12-13-21 @ 06:15) (132/79 - 141/83)  Lipid Panel:

## 2021-12-13 NOTE — BH INPATIENT PSYCHIATRY PROGRESS NOTE - NSBHCHARTREVIEWVS_PSY_A_CORE FT
Vital Signs Last 24 Hrs  T(C): 36.8 (12-13-21 @ 06:15), Max: 36.8 (12-13-21 @ 06:15)  T(F): 98.2 (12-13-21 @ 06:15), Max: 98.2 (12-13-21 @ 06:15)  HR: 112 (12-12-21 @ 19:55) (112 - 112)  BP: 132/79 (12-13-21 @ 06:15) (132/79 - 141/83)  BP(mean): 88 (12-13-21 @ 06:15) (88 - 88)  RR: 18 (12-13-21 @ 06:15) (18 - 18)  SpO2: --    Orthostatic VS  12-11-21 @ 23:28  Lying BP: --/-- HR: --  Sitting BP: 103/86 HR: 102  Standing BP: --/-- HR: --  Site: upper left arm  Mode: electronic

## 2021-12-13 NOTE — BH INPATIENT PSYCHIATRY PROGRESS NOTE - PRN MEDS
MEDICATIONS  (PRN):  diphenhydrAMINE Injectable 50 milliGRAM(s) IntraMuscular once PRN agitation  haloperidol    Injectable 5 milliGRAM(s) IntraMuscular once PRN severe agitation  LORazepam     Tablet 2 milliGRAM(s) Oral every 6 hours PRN agitation  LORazepam   Injectable 2 milliGRAM(s) IntraMuscular once PRN agitation  OLANZapine 5 milliGRAM(s) Oral every 6 hours PRN agitation

## 2021-12-13 NOTE — BH INPATIENT PSYCHIATRY PROGRESS NOTE - NSBHASSESSSUMMFT_PSY_ALL_CORE
Pt remains extremely irritable at times with agitated and threatening behavior/words. He is largely uncooperative though compliant with prescribed meds thus far. Pt s/p nearly two-week hospitalization and work-up for AMS (which was unremarkable) and treatment of EtOH withdrawal. Suspect presentation primarily due to psychotic illness in the context of prolonged nonadherence to outpt treatment and meds and likely polysubstance and EtOH use.     Plan:  1. Titrate Zyprexa to 10mg BID. Will consider switching to antipsychotic with KIRK option given recent prolonged non-adherence (likely Abilify vs Risperdal).   2. C/w Depakote DR 500mg BID  3. D/c Cogentin given no clear indication and preference to minimize polypharmacy  4. D/c CIWA protocol as pt very unlikely to experiencing withdrawal symptoms (given medical admission with CIWA 11/30 - 12/10)  5. Start Klonopin 1mg BID for psychotic agitation

## 2021-12-13 NOTE — BH SOCIAL WORK INITIAL PSYCHOSOCIAL EVALUATION - OTHER PAST PSYCHIATRIC HISTORY (INCLUDE DETAILS REGARDING ONSET, COURSE OF ILLNESS, INPATIENT/OUTPATIENT TREATMENT)
EMR reports " Pt is a 50yo M, presented to [facility] earlier today and reported to have had an bottle of alcohol and presented with head laceration and after repair, asked for place to stay due to cold weather, and subsequently reportedly brought to [facility] after AMA, consult called today for reported schizophrenia. On assessment, pt unable to meaningfully interact, reports name is 'Adam Franklin', or other name, reports he is at [place], reports he drank 'everything', unable to answer further questions"  Upon SW assessment Pt reported he was homeless and is in need of a place to stay. Pt reported his nephew was trying to assist him. Pt provided verbal consent to communicate with nephew Omkar [phone] prior to requesting SW get away from Pt.

## 2021-12-14 PROCEDURE — 99232 SBSQ HOSP IP/OBS MODERATE 35: CPT

## 2021-12-14 RX ORDER — CLONAZEPAM 1 MG
1 TABLET ORAL
Refills: 0 | Status: DISCONTINUED | OUTPATIENT
Start: 2021-12-14 | End: 2021-12-21

## 2021-12-14 RX ADMIN — Medication 1 MILLIGRAM(S): at 20:08

## 2021-12-14 RX ADMIN — Medication 325 MILLIGRAM(S): at 08:11

## 2021-12-14 RX ADMIN — Medication 2 MILLIGRAM(S): at 20:11

## 2021-12-14 RX ADMIN — Medication 1 MILLIGRAM(S): at 08:11

## 2021-12-14 RX ADMIN — OLANZAPINE 10 MILLIGRAM(S): 15 TABLET, FILM COATED ORAL at 20:08

## 2021-12-14 RX ADMIN — OLANZAPINE 10 MILLIGRAM(S): 15 TABLET, FILM COATED ORAL at 08:11

## 2021-12-14 RX ADMIN — DIVALPROEX SODIUM 500 MILLIGRAM(S): 500 TABLET, DELAYED RELEASE ORAL at 20:08

## 2021-12-14 RX ADMIN — Medication 100 MILLIGRAM(S): at 08:11

## 2021-12-14 RX ADMIN — DIVALPROEX SODIUM 500 MILLIGRAM(S): 500 TABLET, DELAYED RELEASE ORAL at 08:11

## 2021-12-14 NOTE — BH INPATIENT PSYCHIATRY PROGRESS NOTE - NSBHMETABOLIC_PSY_ALL_CORE_FT
BMI:   HbA1c:   Glucose: POCT Blood Glucose.: 95 mg/dL (11-30-21 @ 05:53)    BP: 135/84 (12-14-21 @ 08:00) (132/79 - 141/83)  Lipid Panel:

## 2021-12-14 NOTE — BH INPATIENT PSYCHIATRY PROGRESS NOTE - NSBHCHARTREVIEWVS_PSY_A_CORE FT
Vital Signs Last 24 Hrs  T(C): 36.4 (12-14-21 @ 08:00), Max: 36.4 (12-14-21 @ 08:00)  T(F): 97.5 (12-14-21 @ 08:00), Max: 97.5 (12-14-21 @ 08:00)  HR: 86 (12-14-21 @ 08:00) (86 - 86)  BP: 135/84 (12-14-21 @ 08:00) (135/84 - 135/84)  BP(mean): --  RR: --  SpO2: --

## 2021-12-14 NOTE — BH INPATIENT PSYCHIATRY PROGRESS NOTE - NSBHFUPINTERVALHXFT_PSY_A_CORE
No acute events overnight. Pt remains isolative to his room. Can be transiently pleasant though remains largely irritable, uncooperative, and inappropriate. Continues to refuse full evaluation. Writer attempted interview today however pt repeatedly stated he "wants to fuck," remained sexually inappropriate and refused to answer writer's questions. Requested food and for staff to leave his room. Has not showered and appears very disheveled.

## 2021-12-14 NOTE — BH INPATIENT PSYCHIATRY PROGRESS NOTE - OTHER
pt recently endorsing AH, though unable to assess at this time likely delusions though unable to fully assess

## 2021-12-14 NOTE — BH INPATIENT PSYCHIATRY PROGRESS NOTE - NSBHASSESSSUMMFT_PSY_ALL_CORE
Pt remains extremely irritable, often hostile, uncooperative with any type of comprehensive evaluation and very sexually preoccupied/inappropriate. Despite this he is compliant with prescribed meds thus far. Pt s/p nearly two-week hospitalization and work-up for AMS (which was unremarkable) and treatment of EtOH withdrawal. Suspect presentation primarily due to psychotic illness in the context of prolonged nonadherence to outpt treatment and meds and likely polysubstance and EtOH use.     Plan:  1. C/w Zyprexa to 10mg BID. Will consider switching to antipsychotic with KIRK option given recent prolonged non-adherence (pt previously prescribed Abilify in Jan 2021).   2. C/w Depakote DR 500mg BID  3. C/w Klonopin 1mg BID for psychotic agitation   4. Will continue to collaborate with pt's newphew

## 2021-12-15 LAB — VIT B1 SERPL-MCNC: 500 NMOL/L — HIGH (ref 66.5–200)

## 2021-12-15 PROCEDURE — 99232 SBSQ HOSP IP/OBS MODERATE 35: CPT

## 2021-12-15 RX ORDER — ACETAMINOPHEN 500 MG
650 TABLET ORAL ONCE
Refills: 0 | Status: COMPLETED | OUTPATIENT
Start: 2021-12-15 | End: 2021-12-15

## 2021-12-15 RX ORDER — ARIPIPRAZOLE 15 MG/1
10 TABLET ORAL DAILY
Refills: 0 | Status: DISCONTINUED | OUTPATIENT
Start: 2021-12-15 | End: 2021-12-16

## 2021-12-15 RX ADMIN — OLANZAPINE 10 MILLIGRAM(S): 15 TABLET, FILM COATED ORAL at 20:33

## 2021-12-15 RX ADMIN — DIVALPROEX SODIUM 500 MILLIGRAM(S): 500 TABLET, DELAYED RELEASE ORAL at 20:33

## 2021-12-15 RX ADMIN — DIVALPROEX SODIUM 500 MILLIGRAM(S): 500 TABLET, DELAYED RELEASE ORAL at 08:05

## 2021-12-15 RX ADMIN — OLANZAPINE 10 MILLIGRAM(S): 15 TABLET, FILM COATED ORAL at 08:04

## 2021-12-15 RX ADMIN — Medication 1 MILLIGRAM(S): at 20:34

## 2021-12-15 RX ADMIN — ARIPIPRAZOLE 10 MILLIGRAM(S): 15 TABLET ORAL at 12:18

## 2021-12-15 RX ADMIN — Medication 100 MILLIGRAM(S): at 08:05

## 2021-12-15 RX ADMIN — Medication 325 MILLIGRAM(S): at 08:04

## 2021-12-15 RX ADMIN — Medication 650 MILLIGRAM(S): at 17:11

## 2021-12-15 RX ADMIN — Medication 1 MILLIGRAM(S): at 08:04

## 2021-12-15 NOTE — BH INPATIENT PSYCHIATRY PROGRESS NOTE - NSBHCHARTREVIEWVS_PSY_A_CORE FT
Vital Signs Last 24 Hrs  T(C): 36.9 (12-15-21 @ 08:20), Max: 37.8 (12-14-21 @ 19:59)  T(F): 98.4 (12-15-21 @ 08:20), Max: 100 (12-14-21 @ 19:59)  HR: 102 (12-14-21 @ 16:48) (102 - 102)  BP: --  BP(mean): --  RR: --  SpO2: --    Orthostatic VS  12-15-21 @ 08:20  Lying BP: --/-- HR: --  Sitting BP: --/-- HR: --  Standing BP: 118/67 HR: 87  Site: --  Mode: --  Orthostatic VS  12-14-21 @ 19:59  Lying BP: --/-- HR: --  Sitting BP: 144/88 HR: 107  Standing BP: --/-- HR: --  Site: upper right arm  Mode: electronic  Orthostatic VS  12-14-21 @ 16:48  Lying BP: --/-- HR: --  Sitting BP: 115/64 HR: --  Standing BP: --/-- HR: --  Site: --  Mode: --

## 2021-12-15 NOTE — BH INPATIENT PSYCHIATRY PROGRESS NOTE - NSBHFUPINTERVALHXFT_PSY_A_CORE
No acute events overnight. Pt slept well. Remains very isolative to bedroom, in bed all day. Did shower this morning. Remains irritable, uncooperative, and hostile. Minimally engaged in evaluation though reports SI (without intent or plan at present) and distressing AH that laugh at him. Reports in the past AH have improved with Abilify. Reports feeling depressed. Angrily requests for someone to kill him then yells at writer to leave.

## 2021-12-15 NOTE — BH INPATIENT PSYCHIATRY PROGRESS NOTE - PRN MEDS
MEDICATIONS  (PRN):  aluminum hydroxide/magnesium hydroxide/simethicone Suspension 30 milliLiter(s) Oral every 4 hours PRN Dyspepsia  diphenhydrAMINE Injectable 50 milliGRAM(s) IntraMuscular once PRN agitation  haloperidol    Injectable 5 milliGRAM(s) IntraMuscular once PRN severe agitation  LORazepam     Tablet 2 milliGRAM(s) Oral every 6 hours PRN agitation  LORazepam   Injectable 2 milliGRAM(s) IntraMuscular once PRN agitation  OLANZapine 5 milliGRAM(s) Oral every 6 hours PRN agitation

## 2021-12-15 NOTE — BH INPATIENT PSYCHIATRY PROGRESS NOTE - CURRENT MEDICATION
MEDICATIONS  (STANDING):  ARIPiprazole 10 milliGRAM(s) Oral daily  clonazePAM  Tablet 1 milliGRAM(s) Oral two times a day  diVALproex  milliGRAM(s) Oral two times a day  ferrous    sulfate 325 milliGRAM(s) Oral daily  OLANZapine Disintegrating Tablet 10 milliGRAM(s) Oral two times a day  thiamine 100 milliGRAM(s) Oral daily    MEDICATIONS  (PRN):  aluminum hydroxide/magnesium hydroxide/simethicone Suspension 30 milliLiter(s) Oral every 4 hours PRN Dyspepsia  diphenhydrAMINE Injectable 50 milliGRAM(s) IntraMuscular once PRN agitation  haloperidol    Injectable 5 milliGRAM(s) IntraMuscular once PRN severe agitation  LORazepam     Tablet 2 milliGRAM(s) Oral every 6 hours PRN agitation  LORazepam   Injectable 2 milliGRAM(s) IntraMuscular once PRN agitation  OLANZapine 5 milliGRAM(s) Oral every 6 hours PRN agitation

## 2021-12-15 NOTE — BH INPATIENT PSYCHIATRY PROGRESS NOTE - NSBHASSESSSUMMFT_PSY_ALL_CORE
Pt remains extremely irritable, often hostile, mostly uncooperative with any type of comprehensive evaluation and sexually preoccupied/inappropriate. Despite this he is compliant with prescribed meds thus far and endorsing depressed mood, SI, and distressing AH.     Plan:  1. C/w Zyprexa to 10mg BID though will switch antipsychotic to Abilify given hx of good response to this. Start Abilfy 10mg daily with goal for KIRK.   2. C/w Depakote DR 500mg BID  3. C/w Klonopin 1mg BID for psychotic agitation   4. Will continue to collaborate with pt's newphew

## 2021-12-16 PROCEDURE — 99232 SBSQ HOSP IP/OBS MODERATE 35: CPT

## 2021-12-16 RX ORDER — ARIPIPRAZOLE 15 MG/1
15 TABLET ORAL DAILY
Refills: 0 | Status: DISCONTINUED | OUTPATIENT
Start: 2021-12-16 | End: 2021-12-17

## 2021-12-16 RX ORDER — OLANZAPINE 15 MG/1
5 TABLET, FILM COATED ORAL DAILY
Refills: 0 | Status: DISCONTINUED | OUTPATIENT
Start: 2021-12-16 | End: 2021-12-17

## 2021-12-16 RX ORDER — OLANZAPINE 15 MG/1
10 TABLET, FILM COATED ORAL AT BEDTIME
Refills: 0 | Status: DISCONTINUED | OUTPATIENT
Start: 2021-12-16 | End: 2021-12-20

## 2021-12-16 RX ADMIN — Medication 100 MILLIGRAM(S): at 08:38

## 2021-12-16 RX ADMIN — Medication 325 MILLIGRAM(S): at 08:38

## 2021-12-16 RX ADMIN — Medication 1 MILLIGRAM(S): at 08:38

## 2021-12-16 RX ADMIN — ARIPIPRAZOLE 10 MILLIGRAM(S): 15 TABLET ORAL at 08:37

## 2021-12-16 RX ADMIN — DIVALPROEX SODIUM 500 MILLIGRAM(S): 500 TABLET, DELAYED RELEASE ORAL at 08:38

## 2021-12-16 RX ADMIN — OLANZAPINE 10 MILLIGRAM(S): 15 TABLET, FILM COATED ORAL at 20:59

## 2021-12-16 RX ADMIN — DIVALPROEX SODIUM 500 MILLIGRAM(S): 500 TABLET, DELAYED RELEASE ORAL at 20:59

## 2021-12-16 RX ADMIN — Medication 1 MILLIGRAM(S): at 20:59

## 2021-12-16 RX ADMIN — OLANZAPINE 10 MILLIGRAM(S): 15 TABLET, FILM COATED ORAL at 08:38

## 2021-12-16 NOTE — BH INPATIENT PSYCHIATRY PROGRESS NOTE - NSBHMETABOLIC_PSY_ALL_CORE_FT
BMI:   HbA1c:   Glucose: POCT Blood Glucose.: 95 mg/dL (11-30-21 @ 05:53)    BP: 135/84 (12-14-21 @ 08:00) (135/84 - 135/84)  Lipid Panel:

## 2021-12-16 NOTE — BH INPATIENT PSYCHIATRY PROGRESS NOTE - NSBHASSESSSUMMFT_PSY_ALL_CORE
Pt remains extremely irritable, often hostile, mostly uncooperative with any type of comprehensive evaluation. Despite this he is compliant with prescribed meds thus far and endorsing depressed mood, SI, and distressing AH.     Plan:  1. Taper Zyprexa to 5mg daily + 10mg qhs and titrate Abilfy to 15mg daily with goal for KIRK.   2. C/w Depakote DR 500mg BID  3. C/w Klonopin 1mg BID for psychotic agitation   4. Will continue to collaborate with pt's newphew

## 2021-12-16 NOTE — BH INPATIENT PSYCHIATRY PROGRESS NOTE - NSBHCHARTREVIEWVS_PSY_A_CORE FT
Vital Signs Last 24 Hrs  T(C): 36.7 (12-16-21 @ 08:53), Max: 36.7 (12-16-21 @ 08:53)  T(F): 98.1 (12-16-21 @ 08:53), Max: 98.1 (12-16-21 @ 08:53)  HR: --  BP: --  BP(mean): --  RR: --  SpO2: --    Orthostatic VS  12-16-21 @ 08:53  Lying BP: --/-- HR: --  Sitting BP: 108/86 HR: 97  Standing BP: --/-- HR: --  Site: --  Mode: --  Orthostatic VS  12-15-21 @ 08:20  Lying BP: --/-- HR: --  Sitting BP: --/-- HR: --  Standing BP: 118/67 HR: 87  Site: --  Mode: --  Orthostatic VS  12-14-21 @ 19:59  Lying BP: --/-- HR: --  Sitting BP: 144/88 HR: 107  Standing BP: --/-- HR: --  Site: upper right arm  Mode: electronic  Orthostatic VS  12-14-21 @ 16:48  Lying BP: --/-- HR: --  Sitting BP: 115/64 HR: --  Standing BP: --/-- HR: --  Site: --  Mode: --

## 2021-12-16 NOTE — BH INPATIENT PSYCHIATRY PROGRESS NOTE - NSBHFUPINTERVALHXFT_PSY_A_CORE
No acute events overnight. Pt compliant with meds. Remains very irritable and isolative to his bedroom. Reports headache and pain all over his body though refuses to elaborate further. Slept well. Remains very uncooperative and demanding that writer leave him alone and leave his room after attempting to engage him in evaluation for several minutes.

## 2021-12-17 LAB
A-TOCOPHEROL VIT E SERPL-MCNC: 9.8 MG/L — SIGNIFICANT CHANGE UP (ref 7–25.1)
BETA+GAMMA TOCOPHEROL SERPL-MCNC: 2 MG/L — SIGNIFICANT CHANGE UP (ref 0.5–5.5)

## 2021-12-17 PROCEDURE — 99232 SBSQ HOSP IP/OBS MODERATE 35: CPT

## 2021-12-17 PROCEDURE — 93010 ELECTROCARDIOGRAM REPORT: CPT

## 2021-12-17 RX ORDER — ARIPIPRAZOLE 15 MG/1
20 TABLET ORAL DAILY
Refills: 0 | Status: DISCONTINUED | OUTPATIENT
Start: 2021-12-17 | End: 2021-12-20

## 2021-12-17 RX ORDER — ACETAMINOPHEN 500 MG
650 TABLET ORAL ONCE
Refills: 0 | Status: COMPLETED | OUTPATIENT
Start: 2021-12-17 | End: 2021-12-17

## 2021-12-17 RX ADMIN — DIVALPROEX SODIUM 500 MILLIGRAM(S): 500 TABLET, DELAYED RELEASE ORAL at 08:17

## 2021-12-17 RX ADMIN — Medication 100 MILLIGRAM(S): at 08:17

## 2021-12-17 RX ADMIN — OLANZAPINE 10 MILLIGRAM(S): 15 TABLET, FILM COATED ORAL at 21:05

## 2021-12-17 RX ADMIN — Medication 325 MILLIGRAM(S): at 08:17

## 2021-12-17 RX ADMIN — Medication 650 MILLIGRAM(S): at 21:50

## 2021-12-17 RX ADMIN — ARIPIPRAZOLE 15 MILLIGRAM(S): 15 TABLET ORAL at 08:18

## 2021-12-17 RX ADMIN — Medication 650 MILLIGRAM(S): at 21:05

## 2021-12-17 RX ADMIN — OLANZAPINE 5 MILLIGRAM(S): 15 TABLET, FILM COATED ORAL at 08:17

## 2021-12-17 RX ADMIN — Medication 1 MILLIGRAM(S): at 21:05

## 2021-12-17 RX ADMIN — DIVALPROEX SODIUM 500 MILLIGRAM(S): 500 TABLET, DELAYED RELEASE ORAL at 21:05

## 2021-12-17 RX ADMIN — Medication 1 MILLIGRAM(S): at 08:17

## 2021-12-17 RX ADMIN — ARIPIPRAZOLE 20 MILLIGRAM(S): 15 TABLET ORAL at 21:05

## 2021-12-17 NOTE — BH INPATIENT PSYCHIATRY PROGRESS NOTE - NSBHFUPINTERVALHXFT_PSY_A_CORE
No acute events overnight. Pt slept well. Complaint with meds. Remains very irritable and largely uncooperative though no aggressive behavior or violence. Minimally engaged in evaluation again and demands that writer leave his room after only a few minutes. Continues to report pain "all over my body" though refuses to elaborate further on this. Reports chest pain but then points to his stomach when writer asks to identify exactly the site of pain. Reports ongoing AH and suggests they are command in nature to harm himself however denies intent or plan to do so. States wish to sleep and be left alone. Appetite has been good and pt noted to eat quite a bit. Per pt's nephew, at baseline pt is "very sweet" when appropriately medicated.

## 2021-12-17 NOTE — BH INPATIENT PSYCHIATRY PROGRESS NOTE - NSBHCHARTREVIEWVS_PSY_A_CORE FT
Vital Signs Last 24 Hrs  T(C): 36.3 (12-17-21 @ 08:00), Max: 36.6 (12-16-21 @ 20:10)  T(F): 97.4 (12-17-21 @ 08:00), Max: 97.8 (12-16-21 @ 20:10)  HR: --  BP: --  BP(mean): --  RR: --  SpO2: --    Orthostatic VS  12-17-21 @ 08:00  Lying BP: --/-- HR: --  Sitting BP: 113/68 HR: 70  Standing BP: --/-- HR: --  Site: --  Mode: --  Orthostatic VS  12-16-21 @ 20:10  Lying BP: --/-- HR: --  Sitting BP: 146/81 HR: 99  Standing BP: 143/83 HR: 107  Site: --  Mode: --  Orthostatic VS  12-16-21 @ 08:53  Lying BP: --/-- HR: --  Sitting BP: 108/86 HR: 97  Standing BP: --/-- HR: --  Site: --  Mode: --

## 2021-12-17 NOTE — BH INPATIENT PSYCHIATRY PROGRESS NOTE - CURRENT MEDICATION
MEDICATIONS  (STANDING):  ARIPiprazole 15 milliGRAM(s) Oral daily  clonazePAM  Tablet 1 milliGRAM(s) Oral two times a day  diVALproex  milliGRAM(s) Oral two times a day  ferrous    sulfate 325 milliGRAM(s) Oral daily  OLANZapine 5 milliGRAM(s) Oral daily  OLANZapine 10 milliGRAM(s) Oral at bedtime  thiamine 100 milliGRAM(s) Oral daily    MEDICATIONS  (PRN):  aluminum hydroxide/magnesium hydroxide/simethicone Suspension 30 milliLiter(s) Oral every 4 hours PRN Dyspepsia  diphenhydrAMINE Injectable 50 milliGRAM(s) IntraMuscular once PRN agitation  haloperidol    Injectable 5 milliGRAM(s) IntraMuscular once PRN severe agitation  LORazepam     Tablet 2 milliGRAM(s) Oral every 6 hours PRN agitation  LORazepam   Injectable 2 milliGRAM(s) IntraMuscular once PRN agitation  OLANZapine 5 milliGRAM(s) Oral every 6 hours PRN agitation   MEDICATIONS  (STANDING):  ARIPiprazole 20 milliGRAM(s) Oral daily  clonazePAM  Tablet 1 milliGRAM(s) Oral two times a day  diVALproex  milliGRAM(s) Oral two times a day  ferrous    sulfate 325 milliGRAM(s) Oral daily  OLANZapine 10 milliGRAM(s) Oral at bedtime  thiamine 100 milliGRAM(s) Oral daily    MEDICATIONS  (PRN):  aluminum hydroxide/magnesium hydroxide/simethicone Suspension 30 milliLiter(s) Oral every 4 hours PRN Dyspepsia  diphenhydrAMINE Injectable 50 milliGRAM(s) IntraMuscular once PRN agitation  haloperidol    Injectable 5 milliGRAM(s) IntraMuscular once PRN severe agitation  LORazepam     Tablet 2 milliGRAM(s) Oral every 6 hours PRN agitation  LORazepam   Injectable 2 milliGRAM(s) IntraMuscular once PRN agitation  OLANZapine 5 milliGRAM(s) Oral every 6 hours PRN agitation

## 2021-12-17 NOTE — BH INPATIENT PSYCHIATRY PROGRESS NOTE - NSBHASSESSSUMMFT_PSY_ALL_CORE
Pt remains extremely irritable, often hostile, mostly uncooperative with any type of comprehensive evaluation. Despite this, he is compliant with prescribed meds thus far and endorsing depressed mood, SI, and distressing AH.     Plan:  1. Taper Zyprexa to 10mg qhs. Will titrate Abilify to 20mg daily (with goal for KIRK)  2. C/w Depakote DR 500mg BID. Will get serum level (as well as CMP and CBC) on 12/20 AM.  3. C/w Klonopin 1mg BID for psychotic agitation   4. Will continue to collaborate with pt's nephew  Pt remains extremely irritable, often hostile, mostly uncooperative with any type of comprehensive evaluation. Despite this, he is compliant with prescribed meds thus far and endorsing depressed mood, SI, and distressing AH.     Plan:  1. Taper Zyprexa to 10mg qhs. Will titrate Abilify to 20mg daily (with goal for KIRK)  2. C/w Depakote DR 500mg BID. Will get serum level (as well as CMP and CBC) on 12/20 AM.  3. C/w Klonopin 1mg BID for psychotic agitation   4. Will continue to collaborate with pt's nephew   5. EKG unremarkable for any acute process. Will continue to offer Tylenol for pain and encourage pt to elaborate further on source and type of pain he is experiencing.

## 2021-12-18 RX ORDER — ACETAMINOPHEN 500 MG
650 TABLET ORAL ONCE
Refills: 0 | Status: COMPLETED | OUTPATIENT
Start: 2021-12-18 | End: 2021-12-18

## 2021-12-18 RX ADMIN — Medication 1 MILLIGRAM(S): at 20:56

## 2021-12-18 RX ADMIN — Medication 1 MILLIGRAM(S): at 08:17

## 2021-12-18 RX ADMIN — Medication 325 MILLIGRAM(S): at 08:17

## 2021-12-18 RX ADMIN — Medication 100 MILLIGRAM(S): at 08:17

## 2021-12-18 RX ADMIN — OLANZAPINE 10 MILLIGRAM(S): 15 TABLET, FILM COATED ORAL at 20:56

## 2021-12-18 RX ADMIN — DIVALPROEX SODIUM 500 MILLIGRAM(S): 500 TABLET, DELAYED RELEASE ORAL at 08:17

## 2021-12-18 RX ADMIN — Medication 650 MILLIGRAM(S): at 17:51

## 2021-12-18 RX ADMIN — ARIPIPRAZOLE 20 MILLIGRAM(S): 15 TABLET ORAL at 08:17

## 2021-12-18 RX ADMIN — DIVALPROEX SODIUM 500 MILLIGRAM(S): 500 TABLET, DELAYED RELEASE ORAL at 20:56

## 2021-12-19 RX ADMIN — Medication 325 MILLIGRAM(S): at 08:13

## 2021-12-19 RX ADMIN — DIVALPROEX SODIUM 500 MILLIGRAM(S): 500 TABLET, DELAYED RELEASE ORAL at 20:30

## 2021-12-19 RX ADMIN — ARIPIPRAZOLE 20 MILLIGRAM(S): 15 TABLET ORAL at 08:14

## 2021-12-19 RX ADMIN — Medication 2 MILLIGRAM(S): at 16:43

## 2021-12-19 RX ADMIN — OLANZAPINE 10 MILLIGRAM(S): 15 TABLET, FILM COATED ORAL at 20:30

## 2021-12-19 RX ADMIN — Medication 1 MILLIGRAM(S): at 08:13

## 2021-12-19 RX ADMIN — Medication 100 MILLIGRAM(S): at 08:13

## 2021-12-19 RX ADMIN — DIVALPROEX SODIUM 500 MILLIGRAM(S): 500 TABLET, DELAYED RELEASE ORAL at 08:14

## 2021-12-19 RX ADMIN — Medication 1 MILLIGRAM(S): at 20:30

## 2021-12-20 PROCEDURE — 99232 SBSQ HOSP IP/OBS MODERATE 35: CPT

## 2021-12-20 RX ORDER — HYDROXYZINE HCL 10 MG
50 TABLET ORAL EVERY 6 HOURS
Refills: 0 | Status: DISCONTINUED | OUTPATIENT
Start: 2021-12-20 | End: 2022-01-20

## 2021-12-20 RX ORDER — OLANZAPINE 15 MG/1
5 TABLET, FILM COATED ORAL AT BEDTIME
Refills: 0 | Status: DISCONTINUED | OUTPATIENT
Start: 2021-12-20 | End: 2021-12-22

## 2021-12-20 RX ORDER — ARIPIPRAZOLE 15 MG/1
25 TABLET ORAL DAILY
Refills: 0 | Status: DISCONTINUED | OUTPATIENT
Start: 2021-12-20 | End: 2021-12-22

## 2021-12-20 RX ADMIN — OLANZAPINE 5 MILLIGRAM(S): 15 TABLET, FILM COATED ORAL at 11:47

## 2021-12-20 RX ADMIN — Medication 1 MILLIGRAM(S): at 20:17

## 2021-12-20 RX ADMIN — Medication 1 MILLIGRAM(S): at 09:05

## 2021-12-20 RX ADMIN — Medication 100 MILLIGRAM(S): at 09:05

## 2021-12-20 RX ADMIN — Medication 30 MILLILITER(S): at 21:27

## 2021-12-20 RX ADMIN — DIVALPROEX SODIUM 500 MILLIGRAM(S): 500 TABLET, DELAYED RELEASE ORAL at 09:05

## 2021-12-20 RX ADMIN — Medication 2 MILLIGRAM(S): at 15:50

## 2021-12-20 RX ADMIN — ARIPIPRAZOLE 20 MILLIGRAM(S): 15 TABLET ORAL at 09:05

## 2021-12-20 RX ADMIN — OLANZAPINE 5 MILLIGRAM(S): 15 TABLET, FILM COATED ORAL at 20:17

## 2021-12-20 RX ADMIN — Medication 325 MILLIGRAM(S): at 09:06

## 2021-12-20 RX ADMIN — DIVALPROEX SODIUM 500 MILLIGRAM(S): 500 TABLET, DELAYED RELEASE ORAL at 20:17

## 2021-12-20 NOTE — BH INPATIENT PSYCHIATRY PROGRESS NOTE - NSBHCHARTREVIEWVS_PSY_A_CORE FT
Vital Signs Last 24 Hrs  T(C): 37.2 (12-20-21 @ 11:39), Max: 37.2 (12-20-21 @ 11:39)  T(F): 98.9 (12-20-21 @ 11:39), Max: 98.9 (12-20-21 @ 11:39)  HR: --  BP: --  BP(mean): --  RR: --  SpO2: --    Orthostatic VS  12-20-21 @ 11:39  Lying BP: --/-- HR: --  Sitting BP: 100/60 HR: 61  Standing BP: --/-- HR: --  Site: --  Mode: --  Orthostatic VS  12-19-21 @ 19:34  Lying BP: --/-- HR: --  Sitting BP: --/-- HR: --  Standing BP: 139/75 HR: 116  Site: upper left arm  Mode: electronic  Orthostatic VS  12-19-21 @ 08:38  Lying BP: --/-- HR: --  Sitting BP: 123/69 HR: 87  Standing BP: --/-- HR: --  Site: upper right arm  Mode: electronic  Orthostatic VS  12-18-21 @ 19:35  Lying BP: --/-- HR: --  Sitting BP: 121/76 HR: 97  Standing BP: --/-- HR: --  Site: --  Mode: --

## 2021-12-20 NOTE — BH INPATIENT PSYCHIATRY PROGRESS NOTE - NSBHFUPINTERVALHXFT_PSY_A_CORE
No acute events over the weekend. Pt complaint with meds. Remains isolative to bedroom. Remains intermittently irritable and hostile. Continues to report "pain all over" though cannot further describe pain. Reports having a headache. When asked when headache started he states "when you walked in." Reports ongoing AH though refuses to elaborate further. Repeatedly screams at writer to "fuck off." Interview terminated.

## 2021-12-20 NOTE — BH INPATIENT PSYCHIATRY PROGRESS NOTE - NSBHMETABOLIC_PSY_ALL_CORE_FT
BMI:   HbA1c:   Glucose: POCT Blood Glucose.: 95 mg/dL (11-30-21 @ 05:53)    BP: 128/78 (12-17-21 @ 19:28) (128/78 - 128/78)  Lipid Panel:

## 2021-12-20 NOTE — BH INPATIENT PSYCHIATRY PROGRESS NOTE - NSBHASSESSSUMMFT_PSY_ALL_CORE
Pt remains extremely irritable, often hostile, mostly uncooperative with any type of comprehensive evaluation. Despite this, he is compliant with prescribed meds thus far and endorsing depressed mood, SI, and distressing AH.     Plan:  1. Taper Zyprexa to 5mg qhs. Will titrate Abilify to 25mg daily (with goal for KIRK)  2. C/w Depakote DR 500mg BID. Will get serum level (as well as CMP and CBC) tomorrow AM.  3. C/w Klonopin 1mg BID for psychotic agitation   4. Will continue to collaborate with pt's nephew

## 2021-12-20 NOTE — BH INPATIENT PSYCHIATRY PROGRESS NOTE - CURRENT MEDICATION
MEDICATIONS  (STANDING):  ARIPiprazole 20 milliGRAM(s) Oral daily  clonazePAM  Tablet 1 milliGRAM(s) Oral two times a day  diVALproex  milliGRAM(s) Oral two times a day  ferrous    sulfate 325 milliGRAM(s) Oral daily  OLANZapine 10 milliGRAM(s) Oral at bedtime  thiamine 100 milliGRAM(s) Oral daily    MEDICATIONS  (PRN):  aluminum hydroxide/magnesium hydroxide/simethicone Suspension 30 milliLiter(s) Oral every 4 hours PRN Dyspepsia  diphenhydrAMINE Injectable 50 milliGRAM(s) IntraMuscular once PRN agitation  haloperidol    Injectable 5 milliGRAM(s) IntraMuscular once PRN severe agitation  LORazepam     Tablet 2 milliGRAM(s) Oral every 6 hours PRN agitation  LORazepam   Injectable 2 milliGRAM(s) IntraMuscular once PRN agitation  OLANZapine 5 milliGRAM(s) Oral every 6 hours PRN agitation

## 2021-12-21 LAB
A1C WITH ESTIMATED AVERAGE GLUCOSE RESULT: 5.4 % — SIGNIFICANT CHANGE UP (ref 4–5.6)
ALBUMIN SERPL ELPH-MCNC: 3.7 G/DL — SIGNIFICANT CHANGE UP (ref 3.3–5)
ALP SERPL-CCNC: 65 U/L — SIGNIFICANT CHANGE UP (ref 40–120)
ALT FLD-CCNC: 17 U/L — SIGNIFICANT CHANGE UP (ref 4–41)
ANION GAP SERPL CALC-SCNC: 9 MMOL/L — SIGNIFICANT CHANGE UP (ref 7–14)
AST SERPL-CCNC: 18 U/L — SIGNIFICANT CHANGE UP (ref 4–40)
BASOPHILS # BLD AUTO: 0.04 K/UL — SIGNIFICANT CHANGE UP (ref 0–0.2)
BASOPHILS NFR BLD AUTO: 0.6 % — SIGNIFICANT CHANGE UP (ref 0–2)
BILIRUB SERPL-MCNC: 0.2 MG/DL — SIGNIFICANT CHANGE UP (ref 0.2–1.2)
BUN SERPL-MCNC: 19 MG/DL — SIGNIFICANT CHANGE UP (ref 7–23)
CALCIUM SERPL-MCNC: 8.9 MG/DL — SIGNIFICANT CHANGE UP (ref 8.4–10.5)
CHLORIDE SERPL-SCNC: 105 MMOL/L — SIGNIFICANT CHANGE UP (ref 98–107)
CHOLEST SERPL-MCNC: 148 MG/DL — SIGNIFICANT CHANGE UP
CO2 SERPL-SCNC: 28 MMOL/L — SIGNIFICANT CHANGE UP (ref 22–31)
CREAT SERPL-MCNC: 0.58 MG/DL — SIGNIFICANT CHANGE UP (ref 0.5–1.3)
EOSINOPHIL # BLD AUTO: 0.14 K/UL — SIGNIFICANT CHANGE UP (ref 0–0.5)
EOSINOPHIL NFR BLD AUTO: 2.1 % — SIGNIFICANT CHANGE UP (ref 0–6)
ESTIMATED AVERAGE GLUCOSE: 108 — SIGNIFICANT CHANGE UP
GLUCOSE SERPL-MCNC: 81 MG/DL — SIGNIFICANT CHANGE UP (ref 70–99)
HCT VFR BLD CALC: 36.6 % — LOW (ref 39–50)
HDLC SERPL-MCNC: 40 MG/DL — LOW
HGB BLD-MCNC: 10.5 G/DL — LOW (ref 13–17)
IANC: 3.65 K/UL — SIGNIFICANT CHANGE UP (ref 1.5–8.5)
IMM GRANULOCYTES NFR BLD AUTO: 0.4 % — SIGNIFICANT CHANGE UP (ref 0–1.5)
LIPID PNL WITH DIRECT LDL SERPL: 97 MG/DL — SIGNIFICANT CHANGE UP
LYMPHOCYTES # BLD AUTO: 2.03 K/UL — SIGNIFICANT CHANGE UP (ref 1–3.3)
LYMPHOCYTES # BLD AUTO: 29.9 % — SIGNIFICANT CHANGE UP (ref 13–44)
MCHC RBC-ENTMCNC: 21.4 PG — LOW (ref 27–34)
MCHC RBC-ENTMCNC: 28.7 GM/DL — LOW (ref 32–36)
MCV RBC AUTO: 74.5 FL — LOW (ref 80–100)
MONOCYTES # BLD AUTO: 0.9 K/UL — SIGNIFICANT CHANGE UP (ref 0–0.9)
MONOCYTES NFR BLD AUTO: 13.3 % — SIGNIFICANT CHANGE UP (ref 2–14)
NEUTROPHILS # BLD AUTO: 3.65 K/UL — SIGNIFICANT CHANGE UP (ref 1.8–7.4)
NEUTROPHILS NFR BLD AUTO: 53.7 % — SIGNIFICANT CHANGE UP (ref 43–77)
NON HDL CHOLESTEROL: 108 MG/DL — SIGNIFICANT CHANGE UP
NRBC # BLD: 0 /100 WBCS — SIGNIFICANT CHANGE UP
NRBC # FLD: 0 K/UL — SIGNIFICANT CHANGE UP
PLATELET # BLD AUTO: 268 K/UL — SIGNIFICANT CHANGE UP (ref 150–400)
POTASSIUM SERPL-MCNC: 4.2 MMOL/L — SIGNIFICANT CHANGE UP (ref 3.5–5.3)
POTASSIUM SERPL-SCNC: 4.2 MMOL/L — SIGNIFICANT CHANGE UP (ref 3.5–5.3)
PROT SERPL-MCNC: 5.8 G/DL — LOW (ref 6–8.3)
RBC # BLD: 4.91 M/UL — SIGNIFICANT CHANGE UP (ref 4.2–5.8)
RBC # FLD: 21.2 % — HIGH (ref 10.3–14.5)
SODIUM SERPL-SCNC: 142 MMOL/L — SIGNIFICANT CHANGE UP (ref 135–145)
TRIGL SERPL-MCNC: 55 MG/DL — SIGNIFICANT CHANGE UP
VALPROATE SERPL-MCNC: 25.5 UG/ML — LOW (ref 50–100)
WBC # BLD: 6.79 K/UL — SIGNIFICANT CHANGE UP (ref 3.8–10.5)
WBC # FLD AUTO: 6.79 K/UL — SIGNIFICANT CHANGE UP (ref 3.8–10.5)

## 2021-12-21 PROCEDURE — 99232 SBSQ HOSP IP/OBS MODERATE 35: CPT

## 2021-12-21 RX ORDER — CLONAZEPAM 1 MG
1 TABLET ORAL
Refills: 0 | Status: DISCONTINUED | OUTPATIENT
Start: 2021-12-21 | End: 2021-12-21

## 2021-12-21 RX ORDER — CLONAZEPAM 1 MG
0.5 TABLET ORAL
Refills: 0 | Status: DISCONTINUED | OUTPATIENT
Start: 2021-12-21 | End: 2021-12-23

## 2021-12-21 RX ADMIN — Medication 0.5 MILLIGRAM(S): at 20:06

## 2021-12-21 RX ADMIN — DIVALPROEX SODIUM 500 MILLIGRAM(S): 500 TABLET, DELAYED RELEASE ORAL at 08:36

## 2021-12-21 RX ADMIN — ARIPIPRAZOLE 25 MILLIGRAM(S): 15 TABLET ORAL at 08:36

## 2021-12-21 RX ADMIN — Medication 325 MILLIGRAM(S): at 08:36

## 2021-12-21 RX ADMIN — Medication 1 MILLIGRAM(S): at 08:36

## 2021-12-21 RX ADMIN — Medication 100 MILLIGRAM(S): at 08:36

## 2021-12-21 RX ADMIN — OLANZAPINE 5 MILLIGRAM(S): 15 TABLET, FILM COATED ORAL at 20:06

## 2021-12-21 RX ADMIN — DIVALPROEX SODIUM 500 MILLIGRAM(S): 500 TABLET, DELAYED RELEASE ORAL at 20:07

## 2021-12-21 NOTE — BH INPATIENT PSYCHIATRY PROGRESS NOTE - CURRENT MEDICATION
MEDICATIONS  (STANDING):  ARIPiprazole 25 milliGRAM(s) Oral daily  clonazePAM  Tablet 0.5 milliGRAM(s) Oral two times a day  diVALproex  milliGRAM(s) Oral two times a day  ferrous    sulfate 325 milliGRAM(s) Oral daily  OLANZapine 5 milliGRAM(s) Oral at bedtime  thiamine 100 milliGRAM(s) Oral daily    MEDICATIONS  (PRN):  aluminum hydroxide/magnesium hydroxide/simethicone Suspension 30 milliLiter(s) Oral every 4 hours PRN Dyspepsia  diphenhydrAMINE Injectable 50 milliGRAM(s) IntraMuscular once PRN agitation  haloperidol    Injectable 5 milliGRAM(s) IntraMuscular once PRN severe agitation  hydrOXYzine hydrochloride 50 milliGRAM(s) Oral every 6 hours PRN anxiety  LORazepam   Injectable 2 milliGRAM(s) IntraMuscular once PRN agitation  OLANZapine 5 milliGRAM(s) Oral every 6 hours PRN agitation

## 2021-12-21 NOTE — BH INPATIENT PSYCHIATRY PROGRESS NOTE - NSBHMETABOLIC_PSY_ALL_CORE_FT
BMI:   HbA1c: A1C with Estimated Average Glucose Result: 5.4 % (12-21-21 @ 09:46)    Glucose: POCT Blood Glucose.: 95 mg/dL (11-30-21 @ 05:53)    BP: 154/98 (12-21-21 @ 08:29) (154/98 - 154/98)  Lipid Panel: Date/Time: 12-21-21 @ 09:46  Cholesterol, Serum: 148  Direct LDL: --  HDL Cholesterol, Serum: 40  Total Cholesterol/HDL Ration Measurement: --  Triglycerides, Serum: 55

## 2021-12-21 NOTE — BH INPATIENT PSYCHIATRY PROGRESS NOTE - NSBHFUPINTERVALHXFT_PSY_A_CORE
No acute events overnight. Pt remains compliant with meds and slept well. Remains irritable and intermittently hostile/agitated though noted to be cooperative with writer this morning. Continues to report that he "doesn't feel well" though remains unable/unwilling to elaborate further on this. States wish for discharge to facility where he previously was in treatment for substance use. Reports improvement in AH on Abilify. Has been speaking with his nephew with regards to discharge plan. Still reports feeling depressed. Denies suicidal intent or plan. Has not been aggressive or violent. Interview again terminated early as he requests writer to leave so that he can sleep.

## 2021-12-21 NOTE — BH INPATIENT PSYCHIATRY PROGRESS NOTE - NSBHASSESSSUMMFT_PSY_ALL_CORE
Pt remains irritable and mostly uncooperative though in better behavioral control and reporting improvement in AH since starting Abilify.    Plan:  1. C/w Zyprexa 5mg qhs (plan to d/c). C/w Abilify 25mg daily (with goal for KIRK)  2. C/w Depakote DR 500mg BID. Serum level low at 25mg. Will consider titrating and consolidating to qhs dosing.   3. Taper Klonopin to 0.5mg BID (with goal to d/c given improved behavioral control and falls risk)  4. Will continue to collaborate with pt's nephew

## 2021-12-21 NOTE — BH INPATIENT PSYCHIATRY PROGRESS NOTE - NSBHCHARTREVIEWVS_PSY_A_CORE FT
Vital Signs Last 24 Hrs  T(C): 36.6 (12-21-21 @ 08:29), Max: 37.4 (12-20-21 @ 22:40)  T(F): 97.9 (12-21-21 @ 08:29), Max: 99.3 (12-20-21 @ 22:40)  HR: --  BP: 154/98 (12-21-21 @ 08:29) (154/98 - 154/98)  BP(mean): 106 (12-21-21 @ 08:29) (106 - 106)  RR: --  SpO2: --    Orthostatic VS  12-20-21 @ 22:40  Lying BP: --/-- HR: --  Sitting BP: 136/86 HR: 107  Standing BP: --/-- HR: --  Site: --  Mode: --  Orthostatic VS  12-20-21 @ 19:55  Lying BP: --/-- HR: --  Sitting BP: 133/78 HR: 113  Standing BP: 144/83 HR: 116  Site: --  Mode: --  Orthostatic VS  12-20-21 @ 11:39  Lying BP: --/-- HR: --  Sitting BP: 100/60 HR: 61  Standing BP: --/-- HR: --  Site: --  Mode: --  Orthostatic VS  12-19-21 @ 19:34  Lying BP: --/-- HR: --  Sitting BP: --/-- HR: --  Standing BP: 139/75 HR: 116  Site: upper left arm  Mode: electronic

## 2021-12-21 NOTE — BH INPATIENT PSYCHIATRY PROGRESS NOTE - NSBHMSESPABN_PSY_A_CORE
Impaired articulation
Loud volume/Impaired articulation
Impaired articulation
Impaired articulation

## 2021-12-21 NOTE — BH INPATIENT PSYCHIATRY PROGRESS NOTE - PRN MEDS
MEDICATIONS  (PRN):  aluminum hydroxide/magnesium hydroxide/simethicone Suspension 30 milliLiter(s) Oral every 4 hours PRN Dyspepsia  diphenhydrAMINE Injectable 50 milliGRAM(s) IntraMuscular once PRN agitation  haloperidol    Injectable 5 milliGRAM(s) IntraMuscular once PRN severe agitation  hydrOXYzine hydrochloride 50 milliGRAM(s) Oral every 6 hours PRN anxiety  LORazepam   Injectable 2 milliGRAM(s) IntraMuscular once PRN agitation  OLANZapine 5 milliGRAM(s) Oral every 6 hours PRN agitation

## 2021-12-22 LAB — SARS-COV-2 RNA SPEC QL NAA+PROBE: SIGNIFICANT CHANGE UP

## 2021-12-22 PROCEDURE — 99232 SBSQ HOSP IP/OBS MODERATE 35: CPT

## 2021-12-22 RX ORDER — ACETAMINOPHEN 500 MG
650 TABLET ORAL EVERY 6 HOURS
Refills: 0 | Status: DISCONTINUED | OUTPATIENT
Start: 2021-12-22 | End: 2022-02-15

## 2021-12-22 RX ORDER — ARIPIPRAZOLE 15 MG/1
30 TABLET ORAL DAILY
Refills: 0 | Status: DISCONTINUED | OUTPATIENT
Start: 2021-12-22 | End: 2021-12-30

## 2021-12-22 RX ORDER — DIVALPROEX SODIUM 500 MG/1
1000 TABLET, DELAYED RELEASE ORAL AT BEDTIME
Refills: 0 | Status: DISCONTINUED | OUTPATIENT
Start: 2021-12-22 | End: 2022-01-24

## 2021-12-22 RX ADMIN — DIVALPROEX SODIUM 500 MILLIGRAM(S): 500 TABLET, DELAYED RELEASE ORAL at 08:14

## 2021-12-22 RX ADMIN — Medication 325 MILLIGRAM(S): at 08:15

## 2021-12-22 RX ADMIN — Medication 50 MILLIGRAM(S): at 16:11

## 2021-12-22 RX ADMIN — Medication 100 MILLIGRAM(S): at 08:15

## 2021-12-22 RX ADMIN — Medication 0.5 MILLIGRAM(S): at 20:00

## 2021-12-22 RX ADMIN — DIVALPROEX SODIUM 1000 MILLIGRAM(S): 500 TABLET, DELAYED RELEASE ORAL at 20:01

## 2021-12-22 RX ADMIN — OLANZAPINE 5 MILLIGRAM(S): 15 TABLET, FILM COATED ORAL at 16:11

## 2021-12-22 RX ADMIN — ARIPIPRAZOLE 25 MILLIGRAM(S): 15 TABLET ORAL at 08:14

## 2021-12-22 RX ADMIN — Medication 0.5 MILLIGRAM(S): at 08:15

## 2021-12-22 NOTE — BH INPATIENT PSYCHIATRY PROGRESS NOTE - NSBHCHARTREVIEWVS_PSY_A_CORE FT
Vital Signs Last 24 Hrs  T(C): 36.8 (12-22-21 @ 07:48), Max: 37 (12-21-21 @ 19:16)  T(F): 98.3 (12-22-21 @ 07:48), Max: 98.6 (12-21-21 @ 19:16)  HR: --  BP: --  BP(mean): --  RR: --  SpO2: --    Orthostatic VS  12-22-21 @ 07:48  Lying BP: --/-- HR: --  Sitting BP: 140/80 HR: 86  Standing BP: --/-- HR: --  Site: --  Mode: --  Orthostatic VS  12-21-21 @ 19:16  Lying BP: --/-- HR: --  Sitting BP: 123/70 HR: 107  Standing BP: 115/65 HR: 114  Site: --  Mode: --  Orthostatic VS  12-20-21 @ 22:40  Lying BP: --/-- HR: --  Sitting BP: 136/86 HR: 107  Standing BP: --/-- HR: --  Site: --  Mode: --  Orthostatic VS  12-20-21 @ 19:55  Lying BP: --/-- HR: --  Sitting BP: 133/78 HR: 113  Standing BP: 144/83 HR: 116  Site: --  Mode: --

## 2021-12-22 NOTE — BH INPATIENT PSYCHIATRY PROGRESS NOTE - NSBHASSESSSUMMFT_PSY_ALL_CORE
Pt remains irritable and mostly uncooperative though in better behavioral control and now denying AH since starting Abilify.    Plan:  1. D/c Zyprexa 5mg qhs (plan to d/c). Titrate Abilify to 30mg daily (with goal for KIRK)  2. Will switch Depakote to ER formulation and qhs dosing 1000mg qhs (to ensure better compliance). Will likely titrate further given low serum level 25.   3. C/w Klonopin 0.5mg BID (with goal to d/c given improved behavioral control and falls risk)  4. Will continue to collaborate with pt's nephew

## 2021-12-22 NOTE — BH INPATIENT PSYCHIATRY PROGRESS NOTE - NSBHFUPINTERVALHXFT_PSY_A_CORE
No acute events overnight. Pt compliant with meds. Slept well. Continues to report "not feeling well" though remains unable to elaborate further. Describes feelings of derealization, "like I'm not here." Denies AH now and believes Abilify has been effective. States paranoia that he will never leave the hospital and that treatment team does not want to help him. Has been in better behavioral control, less yelling, and slightly more cooperative. Appetite is good. Repeatedly requests for writer to leave him alone so that he can nap.

## 2021-12-23 PROCEDURE — 99232 SBSQ HOSP IP/OBS MODERATE 35: CPT

## 2021-12-23 RX ORDER — CLONAZEPAM 1 MG
0.5 TABLET ORAL
Refills: 0 | Status: DISCONTINUED | OUTPATIENT
Start: 2021-12-23 | End: 2021-12-24

## 2021-12-23 RX ORDER — CLONAZEPAM 1 MG
0.5 TABLET ORAL AT BEDTIME
Refills: 0 | Status: DISCONTINUED | OUTPATIENT
Start: 2021-12-24 | End: 2021-12-28

## 2021-12-23 RX ADMIN — DIVALPROEX SODIUM 1000 MILLIGRAM(S): 500 TABLET, DELAYED RELEASE ORAL at 20:27

## 2021-12-23 RX ADMIN — Medication 100 MILLIGRAM(S): at 07:45

## 2021-12-23 RX ADMIN — Medication 0.5 MILLIGRAM(S): at 20:28

## 2021-12-23 RX ADMIN — Medication 325 MILLIGRAM(S): at 07:45

## 2021-12-23 RX ADMIN — ARIPIPRAZOLE 30 MILLIGRAM(S): 15 TABLET ORAL at 07:45

## 2021-12-23 RX ADMIN — Medication 0.5 MILLIGRAM(S): at 07:45

## 2021-12-23 NOTE — BH INPATIENT PSYCHIATRY PROGRESS NOTE - NSBHMETABOLIC_PSY_ALL_CORE_FT
BMI:   HbA1c: A1C with Estimated Average Glucose Result: 5.4 % (12-21-21 @ 09:46)    Glucose: POCT Blood Glucose.: 95 mg/dL (11-30-21 @ 05:53)    BP: 138/87 (12-23-21 @ 06:36) (138/87 - 154/98)  Lipid Panel: Date/Time: 12-21-21 @ 09:46  Cholesterol, Serum: 148  Direct LDL: --  HDL Cholesterol, Serum: 40  Total Cholesterol/HDL Ration Measurement: --  Triglycerides, Serum: 55

## 2021-12-23 NOTE — BH INPATIENT PSYCHIATRY PROGRESS NOTE - NSBHASSESSSUMMFT_PSY_ALL_CORE
Pt recently more pleasant and cooperative. Remains in good behavioral control. Still reporting some AH though these appear to be less frequent and pt reports improved mood. Pt still vaguely paranoid with regards to belief that he will be kicked out of the hospital.     Plan:  1. C/w Abilify 30mg daily (goal for KIRK)  2. C/w Depakote 1000mg qhs. Will consider titrating.   3. Taper Klonopin to 0.5mg qhs (with goal to d/c given improved behavioral control and falls risk)  4. Will continue to collaborate with pt's nephew

## 2021-12-23 NOTE — BH INPATIENT PSYCHIATRY PROGRESS NOTE - CURRENT MEDICATION
MEDICATIONS  (STANDING):  ARIPiprazole 30 milliGRAM(s) Oral daily  clonazePAM  Tablet 0.5 milliGRAM(s) Oral two times a day  diVALproex ER 1000 milliGRAM(s) Oral at bedtime  ferrous    sulfate 325 milliGRAM(s) Oral daily  thiamine 100 milliGRAM(s) Oral daily    MEDICATIONS  (PRN):  acetaminophen     Tablet .. 650 milliGRAM(s) Oral every 6 hours PRN Mild Pain (1 - 3), Moderate Pain (4 - 6)  aluminum hydroxide/magnesium hydroxide/simethicone Suspension 30 milliLiter(s) Oral every 4 hours PRN Dyspepsia  diphenhydrAMINE Injectable 50 milliGRAM(s) IntraMuscular once PRN agitation  haloperidol    Injectable 5 milliGRAM(s) IntraMuscular once PRN severe agitation  hydrOXYzine hydrochloride 50 milliGRAM(s) Oral every 6 hours PRN anxiety  LORazepam   Injectable 2 milliGRAM(s) IntraMuscular once PRN agitation  OLANZapine 5 milliGRAM(s) Oral every 6 hours PRN agitation

## 2021-12-23 NOTE — BH INPATIENT PSYCHIATRY PROGRESS NOTE - NSBHCHARTREVIEWVS_PSY_A_CORE FT
Vital Signs Last 24 Hrs  T(C): 36.4 (12-23-21 @ 06:36), Max: 36.7 (12-22-21 @ 18:38)  T(F): 97.5 (12-23-21 @ 06:36), Max: 98.1 (12-22-21 @ 18:38)  HR: 92 (12-23-21 @ 06:36) (92 - 92)  BP: 138/87 (12-23-21 @ 06:36) (138/87 - 138/87)  BP(mean): --  RR: --  SpO2: --    Orthostatic VS  12-22-21 @ 18:38  Lying BP: --/-- HR: --  Sitting BP: 141/72 HR: 107  Standing BP: --/-- HR: --  Site: --  Mode: --  Orthostatic VS  12-22-21 @ 07:48  Lying BP: --/-- HR: --  Sitting BP: 140/80 HR: 86  Standing BP: --/-- HR: --  Site: --  Mode: --  Orthostatic VS  12-21-21 @ 19:16  Lying BP: --/-- HR: --  Sitting BP: 123/70 HR: 107  Standing BP: 115/65 HR: 114  Site: --  Mode: --

## 2021-12-23 NOTE — BH INPATIENT PSYCHIATRY PROGRESS NOTE - PRN MEDS
MEDICATIONS  (PRN):  acetaminophen     Tablet .. 650 milliGRAM(s) Oral every 6 hours PRN Mild Pain (1 - 3), Moderate Pain (4 - 6)  aluminum hydroxide/magnesium hydroxide/simethicone Suspension 30 milliLiter(s) Oral every 4 hours PRN Dyspepsia  diphenhydrAMINE Injectable 50 milliGRAM(s) IntraMuscular once PRN agitation  haloperidol    Injectable 5 milliGRAM(s) IntraMuscular once PRN severe agitation  hydrOXYzine hydrochloride 50 milliGRAM(s) Oral every 6 hours PRN anxiety  LORazepam   Injectable 2 milliGRAM(s) IntraMuscular once PRN agitation  OLANZapine 5 milliGRAM(s) Oral every 6 hours PRN agitation

## 2021-12-23 NOTE — BH INPATIENT PSYCHIATRY PROGRESS NOTE - NSBHFUPINTERVALHXFT_PSY_A_CORE
No acute events overnight. Pt slept well. Remains compliant with meds. Pt recently more cooperative and more pleasant. Remains in good behavioral control. Reporting some AH though does not elaborate on such voices further. Repeatedly states that he is worried about being "kicked out" despite reassurance that he is not. Also repeatedly states that he doesn't want to cause any problems, again despite reassurance that he is not. Still reports feeling unwell though this remains vague. Believes Abilify is helping him. Reports come improvement in mood. Denies SIIP and HIIP.

## 2021-12-24 RX ADMIN — Medication 0.5 MILLIGRAM(S): at 20:41

## 2021-12-24 RX ADMIN — Medication 100 MILLIGRAM(S): at 08:15

## 2021-12-24 RX ADMIN — DIVALPROEX SODIUM 1000 MILLIGRAM(S): 500 TABLET, DELAYED RELEASE ORAL at 20:41

## 2021-12-24 RX ADMIN — Medication 50 MILLIGRAM(S): at 19:09

## 2021-12-24 RX ADMIN — Medication 325 MILLIGRAM(S): at 08:15

## 2021-12-24 RX ADMIN — Medication 0.5 MILLIGRAM(S): at 08:15

## 2021-12-24 RX ADMIN — ARIPIPRAZOLE 30 MILLIGRAM(S): 15 TABLET ORAL at 08:15

## 2021-12-25 RX ADMIN — Medication 50 MILLIGRAM(S): at 13:06

## 2021-12-25 RX ADMIN — Medication 325 MILLIGRAM(S): at 08:15

## 2021-12-25 RX ADMIN — Medication 100 MILLIGRAM(S): at 08:16

## 2021-12-25 RX ADMIN — ARIPIPRAZOLE 30 MILLIGRAM(S): 15 TABLET ORAL at 08:15

## 2021-12-25 RX ADMIN — Medication 0.5 MILLIGRAM(S): at 20:17

## 2021-12-25 RX ADMIN — OLANZAPINE 5 MILLIGRAM(S): 15 TABLET, FILM COATED ORAL at 13:06

## 2021-12-25 RX ADMIN — DIVALPROEX SODIUM 1000 MILLIGRAM(S): 500 TABLET, DELAYED RELEASE ORAL at 20:17

## 2021-12-26 RX ADMIN — Medication 325 MILLIGRAM(S): at 08:41

## 2021-12-26 RX ADMIN — Medication 50 MILLIGRAM(S): at 16:44

## 2021-12-26 RX ADMIN — DIVALPROEX SODIUM 1000 MILLIGRAM(S): 500 TABLET, DELAYED RELEASE ORAL at 20:05

## 2021-12-26 RX ADMIN — Medication 0.5 MILLIGRAM(S): at 20:05

## 2021-12-26 RX ADMIN — Medication 100 MILLIGRAM(S): at 08:41

## 2021-12-26 RX ADMIN — ARIPIPRAZOLE 30 MILLIGRAM(S): 15 TABLET ORAL at 08:41

## 2021-12-27 LAB — SARS-COV-2 RNA SPEC QL NAA+PROBE: SIGNIFICANT CHANGE UP

## 2021-12-27 PROCEDURE — 99232 SBSQ HOSP IP/OBS MODERATE 35: CPT

## 2021-12-27 RX ADMIN — ARIPIPRAZOLE 30 MILLIGRAM(S): 15 TABLET ORAL at 08:21

## 2021-12-27 RX ADMIN — Medication 50 MILLIGRAM(S): at 17:01

## 2021-12-27 RX ADMIN — Medication 325 MILLIGRAM(S): at 08:21

## 2021-12-27 RX ADMIN — DIVALPROEX SODIUM 1000 MILLIGRAM(S): 500 TABLET, DELAYED RELEASE ORAL at 20:38

## 2021-12-27 RX ADMIN — Medication 0.5 MILLIGRAM(S): at 20:38

## 2021-12-27 RX ADMIN — Medication 100 MILLIGRAM(S): at 08:21

## 2021-12-27 NOTE — BH INPATIENT PSYCHIATRY PROGRESS NOTE - CURRENT MEDICATION
MEDICATIONS  (STANDING):  ARIPiprazole 30 milliGRAM(s) Oral daily  clonazePAM  Tablet 0.5 milliGRAM(s) Oral at bedtime  diVALproex ER 1000 milliGRAM(s) Oral at bedtime  ferrous    sulfate 325 milliGRAM(s) Oral daily  thiamine 100 milliGRAM(s) Oral daily    MEDICATIONS  (PRN):  acetaminophen     Tablet .. 650 milliGRAM(s) Oral every 6 hours PRN Mild Pain (1 - 3), Moderate Pain (4 - 6)  aluminum hydroxide/magnesium hydroxide/simethicone Suspension 30 milliLiter(s) Oral every 4 hours PRN Dyspepsia  diphenhydrAMINE Injectable 50 milliGRAM(s) IntraMuscular once PRN agitation  haloperidol    Injectable 5 milliGRAM(s) IntraMuscular once PRN severe agitation  hydrOXYzine hydrochloride 50 milliGRAM(s) Oral every 6 hours PRN anxiety  LORazepam   Injectable 2 milliGRAM(s) IntraMuscular once PRN agitation  OLANZapine 5 milliGRAM(s) Oral every 6 hours PRN agitation

## 2021-12-27 NOTE — BH INPATIENT PSYCHIATRY PROGRESS NOTE - NSBHFUPINTERVALHXFT_PSY_A_CORE
No acute events over the long weekend. Pt remain irritable though in better behavioral control and more pleasant. Reports improved AH. Reports stable mood. Denies SIIP and HIIP. Continues to worry that we will "kick him out" and repeats often "I'm not trying to cause any trouble" despite reassurance from staff. No other evidence of psychosis. Sleep well. Mostly isolative to bedroom, comes out for food.

## 2021-12-27 NOTE — BH INPATIENT PSYCHIATRY PROGRESS NOTE - NSBHCHARTREVIEWVS_PSY_A_CORE FT
Vital Signs Last 24 Hrs  T(C): 36.3 (12-27-21 @ 06:28), Max: 36.9 (12-26-21 @ 18:32)  T(F): 97.3 (12-27-21 @ 06:28), Max: 98.5 (12-26-21 @ 18:32)  HR: --  BP: --  BP(mean): --  RR: --  SpO2: --    Orthostatic VS  12-27-21 @ 06:28  Lying BP: --/-- HR: --  Sitting BP: --/-- HR: --  Standing BP: 133/91 HR: 95  Site: --  Mode: --  Orthostatic VS  12-26-21 @ 18:32  Lying BP: --/-- HR: --  Sitting BP: 128/81 HR: 98  Standing BP: --/-- HR: --  Site: --  Mode: --  Orthostatic VS  12-26-21 @ 07:46  Lying BP: --/-- HR: --  Sitting BP: 147/88 HR: 99  Standing BP: --/-- HR: --  Site: --  Mode: --  Orthostatic VS  12-25-21 @ 19:23  Lying BP: --/-- HR: --  Sitting BP: 142/82 HR: 96  Standing BP: --/-- HR: --  Site: upper left arm  Mode: electronic

## 2021-12-27 NOTE — BH INPATIENT PSYCHIATRY PROGRESS NOTE - NSBHASSESSSUMMFT_PSY_ALL_CORE
Pt recently more pleasant and cooperative. Remains in good behavioral control. Still reporting some AH though these appear to be less frequent and pt reports improved mood. Pt still vaguely paranoid with regards to belief that he will be kicked out of the hospital.     Plan:  1. C/w Abilify 30mg daily (goal for KIRK)  2. C/w Depakote 1000mg qhs. Will consider titrating.   3. C/w Klonopin 0.5mg qhs (with goal to d/c given improved behavioral control and falls risk)  4. Will continue to collaborate with pt's nephew

## 2021-12-27 NOTE — BH INPATIENT PSYCHIATRY PROGRESS NOTE - NSBHMETABOLIC_PSY_ALL_CORE_FT
BMI:   HbA1c: A1C with Estimated Average Glucose Result: 5.4 % (12-21-21 @ 09:46)    Glucose: POCT Blood Glucose.: 95 mg/dL (11-30-21 @ 05:53)    BP: --  Lipid Panel: Date/Time: 12-21-21 @ 09:46  Cholesterol, Serum: 148  Direct LDL: --  HDL Cholesterol, Serum: 40  Total Cholesterol/HDL Ration Measurement: --  Triglycerides, Serum: 55

## 2021-12-28 PROCEDURE — 99232 SBSQ HOSP IP/OBS MODERATE 35: CPT

## 2021-12-28 RX ORDER — CLONAZEPAM 1 MG
0.5 TABLET ORAL AT BEDTIME
Refills: 0 | Status: DISCONTINUED | OUTPATIENT
Start: 2021-12-28 | End: 2021-12-29

## 2021-12-28 RX ADMIN — Medication 100 MILLIGRAM(S): at 08:07

## 2021-12-28 RX ADMIN — ARIPIPRAZOLE 30 MILLIGRAM(S): 15 TABLET ORAL at 08:07

## 2021-12-28 RX ADMIN — Medication 325 MILLIGRAM(S): at 08:07

## 2021-12-28 RX ADMIN — Medication 0.5 MILLIGRAM(S): at 21:07

## 2021-12-28 RX ADMIN — DIVALPROEX SODIUM 1000 MILLIGRAM(S): 500 TABLET, DELAYED RELEASE ORAL at 21:07

## 2021-12-28 NOTE — BH INPATIENT PSYCHIATRY PROGRESS NOTE - NSBHASSESSSUMMFT_PSY_ALL_CORE
Pt remains in good behavioral control and much more cooperative and pleasant. Psychosis also remains improved. Pt still with vague paranoia though this does not appear to be distressing or impacting his behavior. Mood is stable.     Plan:  1. C/w Abilify 30mg daily (goal for [facility])  2. C/w Depakote 1000mg qhs. Will consider titrating.   3. C/w Klonopin 0.5mg qhs (with goal to d/c given improved behavioral control and falls risk)  4. Will continue to collaborate with pt's nephew

## 2021-12-28 NOTE — BH INPATIENT PSYCHIATRY PROGRESS NOTE - NSBHCHARTREVIEWVS_PSY_A_CORE FT
Vital Signs Last 24 Hrs  T(C): 36.9 (12-28-21 @ 08:14), Max: 36.9 (12-28-21 @ 08:14)  T(F): 98.4 (12-28-21 @ 08:14), Max: 98.4 (12-28-21 @ 08:14)  HR: --  BP: --  BP(mean): --  RR: --  SpO2: --    Orthostatic VS  12-28-21 @ 08:14  Lying BP: --/-- HR: --  Sitting BP: 128/86 HR: 96  Standing BP: --/-- HR: --  Site: --  Mode: --  Orthostatic VS  12-27-21 @ 19:25  Lying BP: --/-- HR: --  Sitting BP: 103/63 HR: 100  Standing BP: 92/50 HR: 108  Site: --  Mode: --  Orthostatic VS  12-27-21 @ 06:28  Lying BP: --/-- HR: --  Sitting BP: --/-- HR: --  Standing BP: 133/91 HR: 95  Site: --  Mode: --  Orthostatic VS  12-26-21 @ 18:32  Lying BP: --/-- HR: --  Sitting BP: 128/81 HR: 98  Standing BP: --/-- HR: --  Site: --  Mode: --

## 2021-12-28 NOTE — BH INPATIENT PSYCHIATRY PROGRESS NOTE - NSBHFUPINTERVALHXFT_PSY_A_CORE
No acute events overnight. Pt compliant with meds and slept well. Remains in good behavioral control, much improved and pt now more pleasant. Continues to report improved AH and does not appear to be responding to internal stimuli. Denies feeling unsafe. Denies VH, thought manipulation, IOR, delusions or surveillance, and other paranoia. Still reports feeling "unwell" though unable to elaborate further. Remains fearful that hospital will kick him out despite reassurance from staff that this is not the case. Denies SIIP and HIIP. Reports improved mood. Believes Abilify is helping him.

## 2021-12-29 PROCEDURE — 99232 SBSQ HOSP IP/OBS MODERATE 35: CPT

## 2021-12-29 RX ADMIN — Medication 325 MILLIGRAM(S): at 08:01

## 2021-12-29 RX ADMIN — Medication 50 MILLIGRAM(S): at 16:41

## 2021-12-29 RX ADMIN — DIVALPROEX SODIUM 1000 MILLIGRAM(S): 500 TABLET, DELAYED RELEASE ORAL at 20:18

## 2021-12-29 RX ADMIN — ARIPIPRAZOLE 30 MILLIGRAM(S): 15 TABLET ORAL at 08:01

## 2021-12-29 RX ADMIN — Medication 100 MILLIGRAM(S): at 08:01

## 2021-12-29 NOTE — BH INPATIENT PSYCHIATRY PROGRESS NOTE - NSBHCHARTREVIEWVS_PSY_A_CORE FT
Vital Signs Last 24 Hrs  T(C): 36.7 (12-29-21 @ 07:52), Max: 37.1 (12-28-21 @ 20:13)  T(F): 98 (12-29-21 @ 07:52), Max: 98.8 (12-28-21 @ 20:13)  HR: --  BP: --  BP(mean): --  RR: --  SpO2: --    Orthostatic VS  12-29-21 @ 07:52  Lying BP: --/-- HR: --  Sitting BP: 117/76 HR: 98  Standing BP: --/-- HR: --  Site: --  Mode: --  Orthostatic VS  12-28-21 @ 20:13  Lying BP: --/-- HR: --  Sitting BP: 145/80 HR: 101  Standing BP: 143/83 HR: 107  Site: --  Mode: --  Orthostatic VS  12-28-21 @ 08:14  Lying BP: --/-- HR: --  Sitting BP: 128/86 HR: 96  Standing BP: --/-- HR: --  Site: --  Mode: --  Orthostatic VS  12-27-21 @ 19:25  Lying BP: --/-- HR: --  Sitting BP: 103/63 HR: 100  Standing BP: 92/50 HR: 108  Site: --  Mode: --

## 2021-12-29 NOTE — BH INPATIENT PSYCHIATRY PROGRESS NOTE - NSBHFUPINTERVALHXFT_PSY_A_CORE
No acute events overnight. Pt sleeping well and in good behavioral control. No longer agitated and no aggression or violence. Has been more cooperative and pleasant. Denies AH (and CAH). Denies other symptoms of psychosis as well, including VH, thought manipulation, delusions of surveillance, and IOR. Remains vaguely paranoid that he will not see his family again and that hospital will kick him out despite repeated reassurance. Reports feeling anxious about his future. Has multiple somatic complaints, vaguely reporting that he "does not feel well" and reports feeling nauseous and dizzy sometimes. Mood is low. Acknowledges that physical symptoms may be more a manifestation of depression and anxiety. Denies SIIP.

## 2021-12-29 NOTE — BH INPATIENT PSYCHIATRY PROGRESS NOTE - CURRENT MEDICATION
MEDICATIONS  (STANDING):  ARIPiprazole 30 milliGRAM(s) Oral daily  diVALproex ER 1000 milliGRAM(s) Oral at bedtime  ferrous    sulfate 325 milliGRAM(s) Oral daily  thiamine 100 milliGRAM(s) Oral daily    MEDICATIONS  (PRN):  acetaminophen     Tablet .. 650 milliGRAM(s) Oral every 6 hours PRN Mild Pain (1 - 3), Moderate Pain (4 - 6)  aluminum hydroxide/magnesium hydroxide/simethicone Suspension 30 milliLiter(s) Oral every 4 hours PRN Dyspepsia  diphenhydrAMINE Injectable 50 milliGRAM(s) IntraMuscular once PRN agitation  haloperidol    Injectable 5 milliGRAM(s) IntraMuscular once PRN severe agitation  hydrOXYzine hydrochloride 50 milliGRAM(s) Oral every 6 hours PRN anxiety  LORazepam   Injectable 2 milliGRAM(s) IntraMuscular once PRN agitation  OLANZapine 5 milliGRAM(s) Oral every 6 hours PRN agitation

## 2021-12-29 NOTE — BH INPATIENT PSYCHIATRY PROGRESS NOTE - NSBHASSESSSUMMFT_PSY_ALL_CORE
Pt remains in good behavioral control and much more cooperative and pleasant. Psychosis also remains improved. Pt still with vague paranoia though this does not appear to be distressing or impacting his behavior. Mood is stable though low at times and pt reporting some anxiety about his future.     Plan:  1. C/w Abilify 30mg daily (goal for [facility], pt is agreeable)  2. C/w Depakote 1000mg qhs.    3. D/c Klonopin 0.5mg qhs  4. Will continue to collaborate with pt's nephew re: dispo planning

## 2021-12-30 LAB
ALBUMIN SERPL ELPH-MCNC: 3.8 G/DL — SIGNIFICANT CHANGE UP (ref 3.3–5)
ALP SERPL-CCNC: 69 U/L — SIGNIFICANT CHANGE UP (ref 40–120)
ALT FLD-CCNC: 14 U/L — SIGNIFICANT CHANGE UP (ref 4–41)
AMMONIA BLD-MCNC: 47 UMOL/L — SIGNIFICANT CHANGE UP (ref 11–55)
ANION GAP SERPL CALC-SCNC: 10 MMOL/L — SIGNIFICANT CHANGE UP (ref 7–14)
AST SERPL-CCNC: 13 U/L — SIGNIFICANT CHANGE UP (ref 4–40)
BASOPHILS # BLD AUTO: 0.03 K/UL — SIGNIFICANT CHANGE UP (ref 0–0.2)
BASOPHILS NFR BLD AUTO: 0.4 % — SIGNIFICANT CHANGE UP (ref 0–2)
BILIRUB SERPL-MCNC: 0.2 MG/DL — SIGNIFICANT CHANGE UP (ref 0.2–1.2)
BUN SERPL-MCNC: 15 MG/DL — SIGNIFICANT CHANGE UP (ref 7–23)
CALCIUM SERPL-MCNC: 8.8 MG/DL — SIGNIFICANT CHANGE UP (ref 8.4–10.5)
CHLORIDE SERPL-SCNC: 105 MMOL/L — SIGNIFICANT CHANGE UP (ref 98–107)
CO2 SERPL-SCNC: 25 MMOL/L — SIGNIFICANT CHANGE UP (ref 22–31)
CREAT SERPL-MCNC: 0.63 MG/DL — SIGNIFICANT CHANGE UP (ref 0.5–1.3)
EOSINOPHIL # BLD AUTO: 0.16 K/UL — SIGNIFICANT CHANGE UP (ref 0–0.5)
EOSINOPHIL NFR BLD AUTO: 2.1 % — SIGNIFICANT CHANGE UP (ref 0–6)
FOLATE SERPL-MCNC: 11.6 NG/ML — SIGNIFICANT CHANGE UP (ref 3.1–17.5)
GLUCOSE SERPL-MCNC: 135 MG/DL — HIGH (ref 70–99)
HCT VFR BLD CALC: 38.2 % — LOW (ref 39–50)
HGB BLD-MCNC: 11.3 G/DL — LOW (ref 13–17)
IANC: 4.95 K/UL — SIGNIFICANT CHANGE UP (ref 1.5–8.5)
IMM GRANULOCYTES NFR BLD AUTO: 0.5 % — SIGNIFICANT CHANGE UP (ref 0–1.5)
LYMPHOCYTES # BLD AUTO: 1.78 K/UL — SIGNIFICANT CHANGE UP (ref 1–3.3)
LYMPHOCYTES # BLD AUTO: 23 % — SIGNIFICANT CHANGE UP (ref 13–44)
MAGNESIUM SERPL-MCNC: 1.8 MG/DL — SIGNIFICANT CHANGE UP (ref 1.6–2.6)
MCHC RBC-ENTMCNC: 21.7 PG — LOW (ref 27–34)
MCHC RBC-ENTMCNC: 29.6 GM/DL — LOW (ref 32–36)
MCV RBC AUTO: 73.3 FL — LOW (ref 80–100)
MONOCYTES # BLD AUTO: 0.78 K/UL — SIGNIFICANT CHANGE UP (ref 0–0.9)
MONOCYTES NFR BLD AUTO: 10.1 % — SIGNIFICANT CHANGE UP (ref 2–14)
NEUTROPHILS # BLD AUTO: 4.95 K/UL — SIGNIFICANT CHANGE UP (ref 1.8–7.4)
NEUTROPHILS NFR BLD AUTO: 63.9 % — SIGNIFICANT CHANGE UP (ref 43–77)
NRBC # BLD: 0 /100 WBCS — SIGNIFICANT CHANGE UP
NRBC # FLD: 0 K/UL — SIGNIFICANT CHANGE UP
PHOSPHATE SERPL-MCNC: 3.9 MG/DL — SIGNIFICANT CHANGE UP (ref 2.5–4.5)
PLATELET # BLD AUTO: 256 K/UL — SIGNIFICANT CHANGE UP (ref 150–400)
POTASSIUM SERPL-MCNC: 4.1 MMOL/L — SIGNIFICANT CHANGE UP (ref 3.5–5.3)
POTASSIUM SERPL-SCNC: 4.1 MMOL/L — SIGNIFICANT CHANGE UP (ref 3.5–5.3)
PROT SERPL-MCNC: 6.3 G/DL — SIGNIFICANT CHANGE UP (ref 6–8.3)
RBC # BLD: 5.21 M/UL — SIGNIFICANT CHANGE UP (ref 4.2–5.8)
RBC # FLD: 22.4 % — HIGH (ref 10.3–14.5)
SARS-COV-2 RNA SPEC QL NAA+PROBE: SIGNIFICANT CHANGE UP
SODIUM SERPL-SCNC: 140 MMOL/L — SIGNIFICANT CHANGE UP (ref 135–145)
VIT B12 SERPL-MCNC: 498 PG/ML — SIGNIFICANT CHANGE UP (ref 200–900)
WBC # BLD: 7.74 K/UL — SIGNIFICANT CHANGE UP (ref 3.8–10.5)
WBC # FLD AUTO: 7.74 K/UL — SIGNIFICANT CHANGE UP (ref 3.8–10.5)

## 2021-12-30 PROCEDURE — 99232 SBSQ HOSP IP/OBS MODERATE 35: CPT

## 2021-12-30 RX ORDER — ARIPIPRAZOLE 15 MG/1
882 TABLET ORAL ONCE
Refills: 0 | Status: COMPLETED | OUTPATIENT
Start: 2021-12-30 | End: 2021-12-30

## 2021-12-30 RX ORDER — ARIPIPRAZOLE 15 MG/1
675 TABLET ORAL ONCE
Refills: 0 | Status: COMPLETED | OUTPATIENT
Start: 2021-12-30 | End: 2021-12-30

## 2021-12-30 RX ADMIN — ARIPIPRAZOLE 30 MILLIGRAM(S): 15 TABLET ORAL at 08:22

## 2021-12-30 RX ADMIN — DIVALPROEX SODIUM 1000 MILLIGRAM(S): 500 TABLET, DELAYED RELEASE ORAL at 20:01

## 2021-12-30 RX ADMIN — Medication 50 MILLIGRAM(S): at 16:52

## 2021-12-30 RX ADMIN — Medication 650 MILLIGRAM(S): at 16:57

## 2021-12-30 RX ADMIN — Medication 100 MILLIGRAM(S): at 08:22

## 2021-12-30 RX ADMIN — Medication 325 MILLIGRAM(S): at 08:22

## 2021-12-30 RX ADMIN — ARIPIPRAZOLE 675 MILLIGRAM(S): 15 TABLET ORAL at 12:56

## 2021-12-30 RX ADMIN — ARIPIPRAZOLE 882 MILLIGRAM(S): 15 TABLET ORAL at 13:00

## 2021-12-30 NOTE — BH INPATIENT PSYCHIATRY PROGRESS NOTE - NSBHCHARTREVIEWVS_PSY_A_CORE FT
Vital Signs Last 24 Hrs  T(C): 36.6 (12-30-21 @ 07:45), Max: 36.8 (12-29-21 @ 19:23)  T(F): 97.8 (12-30-21 @ 07:45), Max: 98.3 (12-29-21 @ 19:23)  HR: --  BP: --  BP(mean): --  RR: --  SpO2: --    Orthostatic VS  12-30-21 @ 07:45  Lying BP: --/-- HR: --  Sitting BP: 136/85 HR: 94  Standing BP: --/-- HR: --  Site: --  Mode: --  Orthostatic VS  12-29-21 @ 19:23  Lying BP: --/-- HR: --  Sitting BP: 136/82 HR: 97  Standing BP: 128/78 HR: 100  Site: --  Mode: --  Orthostatic VS  12-29-21 @ 07:52  Lying BP: --/-- HR: --  Sitting BP: 117/76 HR: 98  Standing BP: --/-- HR: --  Site: --  Mode: --  Orthostatic VS  12-28-21 @ 20:13  Lying BP: --/-- HR: --  Sitting BP: 145/80 HR: 101  Standing BP: 143/83 HR: 107  Site: --  Mode: --

## 2021-12-30 NOTE — BH INPATIENT PSYCHIATRY PROGRESS NOTE - NSBHFUPINTERVALHXFT_PSY_A_CORE
No acute events overnight. Pt compliant with meds. Slept well. Remains in much better behavioral control, now cooperative and mostly pleasant. Denies AH and other psychotic symptoms. Reports ongoing anxiety and low mood related to concern about his future. Denies SIIP. Appetite is good. Continues to report vague physical symptoms, today reports feeling somewhat dizzy and lightheaded (unable to differentiate between the two). States that he feels "off" and is worried he is going to die. Remains concerned that the hospital will kick him out to the street despite ongoing reassurance.

## 2021-12-30 NOTE — BH INPATIENT PSYCHIATRY PROGRESS NOTE - CURRENT MEDICATION
MEDICATIONS  (STANDING):  diVALproex ER 1000 milliGRAM(s) Oral at bedtime  ferrous    sulfate 325 milliGRAM(s) Oral daily  thiamine 100 milliGRAM(s) Oral daily    MEDICATIONS  (PRN):  acetaminophen     Tablet .. 650 milliGRAM(s) Oral every 6 hours PRN Mild Pain (1 - 3), Moderate Pain (4 - 6)  aluminum hydroxide/magnesium hydroxide/simethicone Suspension 30 milliLiter(s) Oral every 4 hours PRN Dyspepsia  diphenhydrAMINE Injectable 50 milliGRAM(s) IntraMuscular once PRN agitation  haloperidol    Injectable 5 milliGRAM(s) IntraMuscular once PRN severe agitation  hydrOXYzine hydrochloride 50 milliGRAM(s) Oral every 6 hours PRN anxiety  LORazepam   Injectable 2 milliGRAM(s) IntraMuscular once PRN agitation  OLANZapine 5 milliGRAM(s) Oral every 6 hours PRN agitation

## 2021-12-30 NOTE — BH INPATIENT PSYCHIATRY PROGRESS NOTE - NSBHASSESSSUMMFT_PSY_ALL_CORE
Pt remains in good behavioral control and much more cooperative and pleasant. Psychosis also remains improved/resolved. Pt still with vague paranoia though this does not appear to be distressing or impacting his behavior. Mood is stable though low at times and pt reporting some anxiety about his future. Pt continues to report vague physical symptoms.     Plan:  1. Will give Aristada loading today (Initio IM + 882mg IM + Abilify PO 30mg) and d/c PO Abilify 30mg  2. C/w Depakote 1000mg qhs.    3. With regards to vague physical complaints - pt does not objectively appear unwell or unsteady, he is eating and drinking, and vitals and recent labs have been within normal limits. Pt's confusion and AMS that he initially presented with have also improved significantly. Regardless, will get repeat labs (CBC, CMP, B12, folate, ammonia level). Pt remains on iron supplementation and Hb has been uptrending. Given that pt presented to [facility] prior to current hospitalization with AMS, r/o dx include Wernicke's encephalopathy, however he was treated with IV thiamine during hospitalization at [facility] and remains on oral supplementation.

## 2021-12-30 NOTE — BH INPATIENT PSYCHIATRY PROGRESS NOTE - NSBHPSYCHOLCOGABN_PSY_A_CORE
disoriented to time/disoriented to place/disoriented to person/disoriented to situation
disoriented to place

## 2021-12-31 RX ADMIN — Medication 100 MILLIGRAM(S): at 08:02

## 2021-12-31 RX ADMIN — DIVALPROEX SODIUM 1000 MILLIGRAM(S): 500 TABLET, DELAYED RELEASE ORAL at 20:12

## 2021-12-31 RX ADMIN — Medication 50 MILLIGRAM(S): at 14:15

## 2021-12-31 RX ADMIN — Medication 650 MILLIGRAM(S): at 18:46

## 2021-12-31 RX ADMIN — Medication 325 MILLIGRAM(S): at 08:02

## 2022-01-01 RX ADMIN — Medication 100 MILLIGRAM(S): at 08:40

## 2022-01-01 RX ADMIN — Medication 325 MILLIGRAM(S): at 08:40

## 2022-01-01 RX ADMIN — DIVALPROEX SODIUM 1000 MILLIGRAM(S): 500 TABLET, DELAYED RELEASE ORAL at 20:00

## 2022-01-01 RX ADMIN — Medication 50 MILLIGRAM(S): at 14:06

## 2022-01-02 LAB — SARS-COV-2 RNA SPEC QL NAA+PROBE: SIGNIFICANT CHANGE UP

## 2022-01-02 RX ADMIN — Medication 325 MILLIGRAM(S): at 07:52

## 2022-01-02 RX ADMIN — DIVALPROEX SODIUM 1000 MILLIGRAM(S): 500 TABLET, DELAYED RELEASE ORAL at 20:21

## 2022-01-02 RX ADMIN — Medication 50 MILLIGRAM(S): at 16:18

## 2022-01-02 RX ADMIN — Medication 100 MILLIGRAM(S): at 07:52

## 2022-01-03 PROCEDURE — 99232 SBSQ HOSP IP/OBS MODERATE 35: CPT

## 2022-01-03 RX ADMIN — OLANZAPINE 5 MILLIGRAM(S): 15 TABLET, FILM COATED ORAL at 16:19

## 2022-01-03 RX ADMIN — DIVALPROEX SODIUM 1000 MILLIGRAM(S): 500 TABLET, DELAYED RELEASE ORAL at 20:12

## 2022-01-03 RX ADMIN — Medication 100 MILLIGRAM(S): at 08:09

## 2022-01-03 RX ADMIN — Medication 50 MILLIGRAM(S): at 11:39

## 2022-01-03 RX ADMIN — Medication 325 MILLIGRAM(S): at 08:09

## 2022-01-03 NOTE — BH INPATIENT PSYCHIATRY PROGRESS NOTE - NSBHASSESSSUMMFT_PSY_ALL_CORE
Pt remains in good behavioral control and much more cooperative and pleasant (though still intermittently irritable). Psychosis also remains improved and pt now denying AH. Pt still with vague paranoia about being attacked and dying, despite ongoing reassurance. Mood is stable though low at times and pt reporting some anxiety about his future. Pt continues to report vague physical symptoms.     Plan:  1. Pt s/p Aristada loading on 12/30/21, next monthly Aristada 882mg due 1/27/22.   2. C/w Depakote 1000mg qhs.    3. With regards to vague physical complaints - pt does not objectively appear unwell or unsteady, he is eating and drinking, and vitals and recent labs (including CBC, CMP, B12, folate, ammonia level) have been within normal limits. Pt's confusion and AMS that he initially presented with have also improved significantly. Pt remains on iron supplementation and Hb has been uptrending. Given that pt presented to [facility] prior to current hospitalization with AMS, r/o dx include Wernicke's encephalopathy, however he was treated with IV thiamine during hospitalization at [facility] and remains on oral supplementation. Will continue to monitor.

## 2022-01-03 NOTE — BH INPATIENT PSYCHIATRY PROGRESS NOTE - NSBHFUPINTERVALHXFT_PSY_A_CORE
No acute events over the long weekend. Pt remains in good behavioral control though intermittently irritable and mostly isolative to bedroom. Compliant with meds. Sleeping well. Noted to also be sleeping quite a bit during the day. Continues to report feeling "bad" though remains vague about physical symptoms. Reports fear of being killed or dying or never being discharged from the hospital. Fearful about getting a new roommate and notes a patient pacing outside of his room with some paranoia. Denies AH. Denies SIIP and HIIP. States wish to see and be near his family (mother and nephew). Pt noted to be oriented to person, place, and time/date.

## 2022-01-03 NOTE — BH INPATIENT PSYCHIATRY PROGRESS NOTE - NSBHCHARTREVIEWVS_PSY_A_CORE FT
Vital Signs Last 24 Hrs  T(C): 37.1 (01-03-22 @ 06:00), Max: 37.1 (01-03-22 @ 06:00)  T(F): 98.8 (01-03-22 @ 06:00), Max: 98.8 (01-03-22 @ 06:00)  HR: --  BP: --  BP(mean): --  RR: --  SpO2: --    Orthostatic VS  01-03-22 @ 06:00  Lying BP: --/-- HR: --  Sitting BP: 136/83 HR: 90  Standing BP: --/-- HR: --  Site: --  Mode: --  Orthostatic VS  01-02-22 @ 19:33  Lying BP: --/-- HR: --  Sitting BP: 147/95 HR: 99  Standing BP: --/-- HR: --  Site: --  Mode: --  Orthostatic VS  01-02-22 @ 07:43  Lying BP: --/-- HR: --  Sitting BP: 132/77 HR: 95  Standing BP: --/-- HR: --  Site: --  Mode: --

## 2022-01-04 LAB — SARS-COV-2 RNA SPEC QL NAA+PROBE: SIGNIFICANT CHANGE UP

## 2022-01-04 PROCEDURE — 99232 SBSQ HOSP IP/OBS MODERATE 35: CPT

## 2022-01-04 RX ADMIN — Medication 325 MILLIGRAM(S): at 08:01

## 2022-01-04 RX ADMIN — Medication 50 MILLIGRAM(S): at 20:26

## 2022-01-04 RX ADMIN — Medication 100 MILLIGRAM(S): at 08:02

## 2022-01-04 RX ADMIN — DIVALPROEX SODIUM 1000 MILLIGRAM(S): 500 TABLET, DELAYED RELEASE ORAL at 20:26

## 2022-01-04 NOTE — BH INPATIENT PSYCHIATRY PROGRESS NOTE - NSBHASSESSSUMMFT_PSY_ALL_CORE
Pt remains in good behavioral control and much more cooperative and pleasant (though still intermittently irritable). Psychosis also remains improved and pt now denying AH. Pt still with vague paranoia about being attacked and dying, despite ongoing reassurance. Mood is stable though low at times and pt reporting some anxiety about his future.    Plan:  1. Pt s/p Aristada loading on 12/30/21, next monthly Aristada 882mg due 1/27/22.   2. C/w Depakote 1000mg qhs.    3. With regards to vague physical complaints - pt does not objectively appear unwell or unsteady, he is eating and drinking, and vitals and recent labs (including CBC, CMP, B12, folate, ammonia level) have been within normal limits. Pt's confusion and AMS that he initially presented with have also improved significantly. Pt remains on iron supplementation and Hb has been uptrending. Given that pt presented to [facility] prior to current hospitalization with AMS, r/o dx include Wernicke's encephalopathy, however he was treated with IV thiamine during hospitalization at [facility] and remains on oral supplementation. Will continue to monitor.

## 2022-01-04 NOTE — BH INPATIENT PSYCHIATRY PROGRESS NOTE - NSBHFUPINTERVALHXFT_PSY_A_CORE
No acute events overnight. Pt complaint with meds. Remains in good behavioral control. Mood is stable though down and anxious at times. Pt with wish for discharge. Remains vaguely paranoid about other patients and staff on the unit and belief that staff want to keep him locked up in the hospital. Denies AH. Still with vague physical complaints though these are less frequent. Showered today. States wish for clothes and to shave.

## 2022-01-04 NOTE — BH INPATIENT PSYCHIATRY PROGRESS NOTE - NSBHCHARTREVIEWVS_PSY_A_CORE FT
Vital Signs Last 24 Hrs  T(C): 36.9 (01-04-22 @ 08:08), Max: 36.9 (01-04-22 @ 08:08)  T(F): 98.4 (01-04-22 @ 08:08), Max: 98.4 (01-04-22 @ 08:08)  HR: --  BP: --  BP(mean): --  RR: --  SpO2: --    Orthostatic VS  01-04-22 @ 08:08  Lying BP: --/-- HR: --  Sitting BP: 108/75 HR: 93  Standing BP: --/-- HR: --  Site: --  Mode: --  Orthostatic VS  01-03-22 @ 06:00  Lying BP: --/-- HR: --  Sitting BP: 136/83 HR: 90  Standing BP: --/-- HR: --  Site: --  Mode: --  Orthostatic VS  01-02-22 @ 19:33  Lying BP: --/-- HR: --  Sitting BP: 147/95 HR: 99  Standing BP: --/-- HR: --  Site: --  Mode: --

## 2022-01-05 PROCEDURE — 99232 SBSQ HOSP IP/OBS MODERATE 35: CPT

## 2022-01-05 RX ORDER — VENLAFAXINE HCL 75 MG
37.5 CAPSULE, EXT RELEASE 24 HR ORAL DAILY
Refills: 0 | Status: DISCONTINUED | OUTPATIENT
Start: 2022-01-05 | End: 2022-01-05

## 2022-01-05 RX ORDER — VENLAFAXINE HCL 75 MG
37.5 CAPSULE, EXT RELEASE 24 HR ORAL DAILY
Refills: 0 | Status: DISCONTINUED | OUTPATIENT
Start: 2022-01-05 | End: 2022-01-07

## 2022-01-05 RX ADMIN — DIVALPROEX SODIUM 1000 MILLIGRAM(S): 500 TABLET, DELAYED RELEASE ORAL at 20:51

## 2022-01-05 RX ADMIN — Medication 2 MILLIGRAM(S): at 12:16

## 2022-01-05 RX ADMIN — Medication 100 MILLIGRAM(S): at 09:09

## 2022-01-05 RX ADMIN — Medication 325 MILLIGRAM(S): at 09:09

## 2022-01-05 RX ADMIN — Medication 37.5 MILLIGRAM(S): at 12:16

## 2022-01-05 NOTE — PHARMACOTHERAPY INTERVENTION NOTE - COMMENTS
Spoke with Dr. Cristina regarding if this medication should be the XR formulation. Dr. Cristina changed order to the XR formulation

## 2022-01-05 NOTE — BH INPATIENT PSYCHIATRY PROGRESS NOTE - NSBHFUPINTERVALHXFT_PSY_A_CORE
No acute events overnight. Pt compliant with meds, slept well. Remains in good behavioral control though intermittently irritable and dismissive. Today pt presenting as quite depressed and anxious with wish to die (though denies suicidal intent and plan while hospitalized). Pt hopeless about his future. Still with belief that hospital will "kick me out to the streets." Endorses self-hatred and disappointment that family does not wish to help him more (despite the fact that nephew has been quite supportive). States he is fearful to leave his room and believes patients and staff are speaking negatively about him with potential wish to harm him (says people are calling him malodorous and fat, though there is no evidence of such). Pt also stating that he feels "off" and suggests that there is some sort of gas on the unit making him feel this way. States wish for stable housing, getting a job, and one day getting  and having a family.

## 2022-01-05 NOTE — BH INPATIENT PSYCHIATRY PROGRESS NOTE - NSBHASSESSSUMMFT_PSY_ALL_CORE
Pt remains in good behavioral control and much more cooperative and pleasant (though still intermittently irritable). Psychosis also remains improved and pt now denying AH, though he remains paranoid with persecutory and referential delusions. Pt also now presenting quite depressed and hopeless with passive SI. DDx include psychotic depression.    Plan:  1. Pt s/p Aristada loading on 12/30/21, next monthly Aristada 882mg due 1/27/22.   2. C/w Depakote 1000mg qhs.    3. Start Effexor 37.5mg daily for depression (pt reports hx of good response to this)  4. With regards to vague physical complaints - pt does not objectively appear unwell or unsteady, he is eating and drinking, and vitals and recent labs (including CBC, CMP, B12, folate, ammonia level) have been within normal limits. Pt's confusion and AMS that he initially presented with have also improved significantly. Pt remains on iron supplementation and Hb has been uptrending. Given that pt presented to [facility] prior to current hospitalization with AMS, r/o dx include Wernicke's encephalopathy, however he was treated with IV thiamine during hospitalization at [facility] and remains on oral supplementation. Will continue to monitor.

## 2022-01-05 NOTE — BH INPATIENT PSYCHIATRY PROGRESS NOTE - PRN MEDS
MEDICATIONS  (PRN):  acetaminophen     Tablet .. 650 milliGRAM(s) Oral every 6 hours PRN Mild Pain (1 - 3), Moderate Pain (4 - 6)  aluminum hydroxide/magnesium hydroxide/simethicone Suspension 30 milliLiter(s) Oral every 4 hours PRN Dyspepsia  diphenhydrAMINE Injectable 50 milliGRAM(s) IntraMuscular once PRN agitation  haloperidol    Injectable 5 milliGRAM(s) IntraMuscular once PRN severe agitation  hydrOXYzine hydrochloride 50 milliGRAM(s) Oral every 6 hours PRN anxiety  OLANZapine 5 milliGRAM(s) Oral every 6 hours PRN agitation

## 2022-01-05 NOTE — BH INPATIENT PSYCHIATRY PROGRESS NOTE - CURRENT MEDICATION
MEDICATIONS  (STANDING):  diVALproex ER 1000 milliGRAM(s) Oral at bedtime  ferrous    sulfate 325 milliGRAM(s) Oral daily  thiamine 100 milliGRAM(s) Oral daily  venlafaxine XR 37.5 milliGRAM(s) Oral daily    MEDICATIONS  (PRN):  acetaminophen     Tablet .. 650 milliGRAM(s) Oral every 6 hours PRN Mild Pain (1 - 3), Moderate Pain (4 - 6)  aluminum hydroxide/magnesium hydroxide/simethicone Suspension 30 milliLiter(s) Oral every 4 hours PRN Dyspepsia  diphenhydrAMINE Injectable 50 milliGRAM(s) IntraMuscular once PRN agitation  haloperidol    Injectable 5 milliGRAM(s) IntraMuscular once PRN severe agitation  hydrOXYzine hydrochloride 50 milliGRAM(s) Oral every 6 hours PRN anxiety  OLANZapine 5 milliGRAM(s) Oral every 6 hours PRN agitation

## 2022-01-05 NOTE — BH INPATIENT PSYCHIATRY PROGRESS NOTE - NSBHCHARTREVIEWVS_PSY_A_CORE FT
Vital Signs Last 24 Hrs  T(C): 36.3 (01-05-22 @ 07:50), Max: 36.7 (01-04-22 @ 20:26)  T(F): 97.4 (01-05-22 @ 07:50), Max: 98.1 (01-04-22 @ 20:26)  HR: --  BP: --  BP(mean): --  RR: --  SpO2: --    Orthostatic VS  01-05-22 @ 07:50  Lying BP: --/-- HR: --  Sitting BP: 125/77 HR: 77  Standing BP: --/-- HR: --  Site: --  Mode: --  Orthostatic VS  01-04-22 @ 20:26  Lying BP: --/-- HR: --  Sitting BP: 134/85 HR: 102  Standing BP: 125/80 HR: 107  Site: --  Mode: --  Orthostatic VS  01-04-22 @ 08:08  Lying BP: --/-- HR: --  Sitting BP: 108/75 HR: 93  Standing BP: --/-- HR: --  Site: --  Mode: --

## 2022-01-06 PROCEDURE — 80053 COMPREHEN METABOLIC PANEL: CPT

## 2022-01-06 PROCEDURE — 96375 TX/PRO/DX INJ NEW DRUG ADDON: CPT

## 2022-01-06 PROCEDURE — 82962 GLUCOSE BLOOD TEST: CPT

## 2022-01-06 PROCEDURE — 82728 ASSAY OF FERRITIN: CPT

## 2022-01-06 PROCEDURE — 82175 ASSAY OF ARSENIC: CPT

## 2022-01-06 PROCEDURE — 86140 C-REACTIVE PROTEIN: CPT

## 2022-01-06 PROCEDURE — 84252 ASSAY OF VITAMIN B-2: CPT

## 2022-01-06 PROCEDURE — 85730 THROMBOPLASTIN TIME PARTIAL: CPT

## 2022-01-06 PROCEDURE — 82550 ASSAY OF CK (CPK): CPT

## 2022-01-06 PROCEDURE — 99232 SBSQ HOSP IP/OBS MODERATE 35: CPT

## 2022-01-06 PROCEDURE — 87389 HIV-1 AG W/HIV-1&-2 AB AG IA: CPT

## 2022-01-06 PROCEDURE — 84100 ASSAY OF PHOSPHORUS: CPT

## 2022-01-06 PROCEDURE — 36415 COLL VENOUS BLD VENIPUNCTURE: CPT

## 2022-01-06 PROCEDURE — 82746 ASSAY OF FOLIC ACID SERUM: CPT

## 2022-01-06 PROCEDURE — U0003: CPT

## 2022-01-06 PROCEDURE — 82525 ASSAY OF COPPER: CPT

## 2022-01-06 PROCEDURE — 83550 IRON BINDING TEST: CPT

## 2022-01-06 PROCEDURE — 86780 TREPONEMA PALLIDUM: CPT

## 2022-01-06 PROCEDURE — 81001 URINALYSIS AUTO W/SCOPE: CPT

## 2022-01-06 PROCEDURE — 96374 THER/PROPH/DIAG INJ IV PUSH: CPT

## 2022-01-06 PROCEDURE — 99285 EMERGENCY DEPT VISIT HI MDM: CPT | Mod: 25

## 2022-01-06 PROCEDURE — 85025 COMPLETE CBC W/AUTO DIFF WBC: CPT

## 2022-01-06 PROCEDURE — 84207 ASSAY OF VITAMIN B-6: CPT

## 2022-01-06 PROCEDURE — 84145 PROCALCITONIN (PCT): CPT

## 2022-01-06 PROCEDURE — 84466 ASSAY OF TRANSFERRIN: CPT

## 2022-01-06 PROCEDURE — 83735 ASSAY OF MAGNESIUM: CPT

## 2022-01-06 PROCEDURE — 93005 ELECTROCARDIOGRAM TRACING: CPT

## 2022-01-06 PROCEDURE — 84443 ASSAY THYROID STIM HORMONE: CPT

## 2022-01-06 PROCEDURE — 80307 DRUG TEST PRSMV CHEM ANLYZR: CPT

## 2022-01-06 PROCEDURE — G1004: CPT

## 2022-01-06 PROCEDURE — 86769 SARS-COV-2 COVID-19 ANTIBODY: CPT

## 2022-01-06 PROCEDURE — 83921 ORGANIC ACID SINGLE QUANT: CPT

## 2022-01-06 PROCEDURE — 85379 FIBRIN DEGRADATION QUANT: CPT

## 2022-01-06 PROCEDURE — 70450 CT HEAD/BRAIN W/O DYE: CPT | Mod: ME

## 2022-01-06 PROCEDURE — 85610 PROTHROMBIN TIME: CPT

## 2022-01-06 PROCEDURE — 84446 ASSAY OF VITAMIN E: CPT

## 2022-01-06 PROCEDURE — 82607 VITAMIN B-12: CPT

## 2022-01-06 PROCEDURE — 85045 AUTOMATED RETICULOCYTE COUNT: CPT

## 2022-01-06 PROCEDURE — 84484 ASSAY OF TROPONIN QUANT: CPT

## 2022-01-06 PROCEDURE — 82140 ASSAY OF AMMONIA: CPT

## 2022-01-06 PROCEDURE — 84425 ASSAY OF VITAMIN B-1: CPT

## 2022-01-06 PROCEDURE — 83615 LACTATE (LD) (LDH) ENZYME: CPT

## 2022-01-06 PROCEDURE — 71045 X-RAY EXAM CHEST 1 VIEW: CPT

## 2022-01-06 PROCEDURE — 85027 COMPLETE CBC AUTOMATED: CPT

## 2022-01-06 PROCEDURE — 80048 BASIC METABOLIC PNL TOTAL CA: CPT

## 2022-01-06 PROCEDURE — U0005: CPT

## 2022-01-06 PROCEDURE — 82390 ASSAY OF CERULOPLASMIN: CPT

## 2022-01-06 PROCEDURE — 83540 ASSAY OF IRON: CPT

## 2022-01-06 RX ADMIN — Medication 325 MILLIGRAM(S): at 09:58

## 2022-01-06 RX ADMIN — Medication 37.5 MILLIGRAM(S): at 09:58

## 2022-01-06 RX ADMIN — Medication 50 MILLIGRAM(S): at 14:43

## 2022-01-06 RX ADMIN — DIVALPROEX SODIUM 1000 MILLIGRAM(S): 500 TABLET, DELAYED RELEASE ORAL at 20:43

## 2022-01-06 RX ADMIN — Medication 100 MILLIGRAM(S): at 09:58

## 2022-01-06 NOTE — BH INPATIENT PSYCHIATRY PROGRESS NOTE - NSBHASSESSSUMMFT_PSY_ALL_CORE
Pt remains in good behavioral control and much more cooperative and pleasant (though still intermittently irritable). Psychosis also remains improved and pt now denying AH, though he remains paranoid with persecutory and referential delusions (though without full delusional conviction, more in the realm of paranoid ideation). Pt remains depressed and hopeless with passive SI with regards to his uncertain future/dispo plan.     Plan:  1. Pt s/p Aristada loading on 12/30/21, next monthly Aristada 882mg due 1/27/22.   2. C/w Depakote 1000mg qhs.    3. C/w Effexor 37.5mg daily for depression (will plan to titrate further)  4. With regards to vague physical complaints - pt does not objectively appear unwell or unsteady, he is eating and drinking, and vitals and recent labs (including CBC, CMP, B12, folate, ammonia level) have been within normal limits. Pt's confusion and AMS that he initially presented with have also improved significantly. Pt remains on iron supplementation and Hb has been uptrending. Given that pt presented to [facility] prior to current hospitalization with AMS, r/o dx include Wernicke's encephalopathy, however he was treated with IV thiamine during hospitalization at [place] and remains on oral supplementation. Will continue to monitor.

## 2022-01-06 NOTE — DIETITIAN INITIAL EVALUATION ADULT. - OTHER INFO
Patient admitted to [facility] d/t disorganization, agitation possibly in context of alcohol abuse. Patient endorses good appetite. Denies any GI distress. Had no specific food preferences. Reports had gastric bypass in 2007 and went from 400# to 215#. Patient with 12# weight gain since admission. Patient not interested in diet education for weight management.

## 2022-01-06 NOTE — DIETITIAN INITIAL EVALUATION ADULT. - PERTINENT MEDS FT
diVALproex ER 1000 milliGRAM(s) Oral at bedtime  ferrous    sulfate 325 milliGRAM(s) Oral daily  thiamine 100 milliGRAM(s) Oral daily  venlafaxine XR 37.5 milliGRAM(s) Oral daily

## 2022-01-06 NOTE — BH INPATIENT PSYCHIATRY PROGRESS NOTE - NSBHCHARTREVIEWVS_PSY_A_CORE FT
Vital Signs Last 24 Hrs  T(C): 36.7 (01-06-22 @ 08:01), Max: 36.7 (01-06-22 @ 08:01)  T(F): 98 (01-06-22 @ 08:01), Max: 98 (01-06-22 @ 08:01)  HR: --  BP: --  BP(mean): --  RR: 16 (01-06-22 @ 08:01) (16 - 16)  SpO2: --    Orthostatic VS  01-06-22 @ 08:01  Lying BP: --/-- HR: --  Sitting BP: 125/89 HR: 91  Standing BP: --/-- HR: --  Site: --  Mode: --  Orthostatic VS  01-05-22 @ 07:50  Lying BP: --/-- HR: --  Sitting BP: 125/77 HR: 77  Standing BP: --/-- HR: --  Site: --  Mode: --  Orthostatic VS  01-04-22 @ 20:26  Lying BP: --/-- HR: --  Sitting BP: 134/85 HR: 102  Standing BP: 125/80 HR: 107  Site: --  Mode: --

## 2022-01-06 NOTE — UM REPORT PROGRESS NOTE - NSUMSWACTION_GEN_A_CORE FT
Unit SW provided insight, support, and psycho-education while maintaining contact with identified supports.  SW engaged in phone conferences with Pt's nephew, [employer], and [facility]. SW worker contacted [facility] Pt.  SW contacted [facility] with Pt.  SW contacted [facility].  Unit SW provided insight, support, and psycho-education while maintaining contact with identified supports.  SW communicated with [facility]. SW communicated with [facility].  SW communicated [facility]. SW communicated with [facility].  SW communicated with [facility]. Please note that all of the above organizations were provided by [facility], as they will be able to accept Pt without insurance at these locations as they will be funded by [facility]. Unit SW provided insight, support, and psycho-education while maintaining contact with identified supports.  SW communicated with [facility]. They report no current availability.  SW attempted contact and left msg with  [facility] rehab. This rehab reported no vacancies until the end of February.  SW communicated [facility] Pt successfully completed phone screen, Pt's case is under review with them as they reported possible available spots. CHESTER communicated with [facility] Pt completed screen and was accepted, however, there are no current available beds. They report a bed may be available next week.  SW communicated with [facility] they report continued closure due to COVID outbreak until at least 1/31/22. Please note that all of the above organizations were provided by [facility], as they will be able to accept Pt without insurance at these locations as they will be funded by [facility]. SW communicated with [facility]. SW made inquiry of [facility] of whether or not Pt could receive assistance with insurance reinstatement. They report some facilities like [facility] may provide assistance. CHESTER was present with Pt throughout the phone screens. Unit SW provided insight, support, and psycho-education while maintaining contact with identified supports.  SW communicated with [facility]. They report no current availability.  SW attempted contact and left msg with  [facility]. This rehab reported no vacancies until the end of February.  SW communicated [facility] Pt successfully completed phone screen, Pt's case is under review with them as they reported possible available spots. CHESTER communicated with [facility] Pt completed screen and was accepted, however, there are no current available beds. They report a bed may be available next week.  [facility] they report continued closure due to COVID outbreak until at least 1/31/22. Please note that all of the above organizations were provided by [facility], as they will be able to accept Pt without insurance at these locations as they will be funded by [facility]. CHESTER communicated with [facility]. SW made inquiry of [facility] of whether or not Pt could receive assistance with insurance reinstatement. They report some facilities like [facility] may provide assistance. CHESTER was present with Pt throughout the phone screens. CHESTER faxed requested documentations to the rehabs. Unit SW provided insight, support, and psycho-education while maintaining contact with identified supports.  SW communicated with [facility]. They report no current availability. They report they reported that they would have availability next week. Pt would still need to complete a screen.   CHESTER communicated with [facility] Pt successfully completed phone screen, Pt's case  was still under review with them. SW was instructed to check again on 1/26/22. SW attempted contact on 1/27/22. SW left detailed msg. SW communicated with [facility] Pt completed screen and was accepted, Pt had the possibility for admission on 1/26/22 or 1/28/22.  SW communicated with them on 1/26/22 and explained that a little more time was needed for possible additional Tx for Pt's depression prior to transfer.  SW was informed beds are not held and that SW should call back on Monday afternoon to once [facility] plans were figured out.  SW communicated with [facility] they report continued closure due to COVID outbreak until at least 1/31/22. SW left additional msg of inquiry on 1/27/22. Please note that all of the above organizations were provided by [facility], as they will be able to accept Pt without insurance at these locations as they will be funded by [facility].  Previously during current stay, CHESTER communicated with [facility]. SW made inquiry of [facility] of whether or not Pt could receive assistance with insurance reinstatement. They report some facilities like [facility] may provide assistance.  Unit SW provided insight, support, and psycho-education while maintaining contact with identified supports.  SW communicated with [facility]. They report no current availability. They report they reported that they would have availability next week. Pt would still need to complete a screen.   SW communicated with [facility] Pt successfully completed phone screen, Pt's case  was still under review with them. SW was instructed to check again on 1/26/22. SW attempted contact on 1/27/22. SW left detailed msg. SW communicated with [facility] Pt completed screen and was accepted, Pt had the possibility for admission on 1/26/22 or 1/28/22.  SW communicated with them on 1/26/22 and explained that a little more time was needed for possible additional Tx for Pt's depression prior to transfer.  SW was informed beds are not held and that SW should call back on Monday afternoon to once [facility] plans were figured out.  SW communicated with [facility] they report continued closure due to COVID outbreak until at least 1/31/22. SW left additional msg of inquiry on 1/27/22. Please note that all of the above organizations were provided by [facility], as they will be able to accept Pt without insurance at these locations as they will be funded by [facility].  Previously during current stay, SW communicated with [facility]. SW made inquiry of [facility] of whether or not Pt could receive assistance with insurance reinstatement. They report some facilities like [facility] may provide assistance. Covering SW contacted [facility] and informed them of Pt's continued need for Tx with an estimated discharge date for 2/10/22. [facility] reported that they made a note of it.

## 2022-01-06 NOTE — BH INPATIENT PSYCHIATRY PROGRESS NOTE - NSBHFUPINTERVALHXFT_PSY_A_CORE
No acute events overnight. Pt compliant with meds. Per sleep log pt slept well, though pt reports poor sleep. Remains in good behavioral control though still irritable at times. Pt with improved mood yesterday afternoon and overnight, a bit brighter and noted to be in the day room watching TV, though this morning pt again presenting depressed and hopeless due to dissatisfaction with dispo plan options. Remains paranoid about gas on the unit and suggests that his orange juice has been contaminated with alcohol. Still saying he feels dead sometimes and "off"/dizzy. Reports ongoing passive SI, denies suicidal intent or plan.

## 2022-01-06 NOTE — UM REPORT PROGRESS NOTE - NSUMSWNOTE_GEN_A_CORE FT
Pt is homeless and is in need of [facility] services in [place].  Pt is a [place] resident.  Pt has no current active insurance nor active benefits.  Pt has a nephew and mother in [place].  Pt's nephew is involved in Pt's care.  Pt continues to be depressed with hopeless, helpless thoughts.  Pt is homeless and is in need of [facility] services in [place].  Pt is a [place] resident.  Pt has no current active insurance nor active benefits.  Pt has a nephew and mother in [place].  Pt's nephew is involved in Pt's care.  Pt continues to be depressed with hopeless, helpless thoughts. Pt has participated appropriately in phone screens with the residences that would accept Pt without insurance. Pt maintains communication with his nephew.  Pt is homeless and is in need of [facility] services in [place].  Pt is a [place] resident.  Pt has no current active insurance nor active benefits.  Pt has a nephew and mother in [place].  Pt's nephew is involved in Pt's care.  Pt continues to be depressed with hopeless, helpless thoughts. Pt has participated appropriately in phone screens with the residences that would accept Pt without insurance. Pt maintains communication with his nephew. Pt has participated appropriately in groups. Pt has agreed to ECT Tx .  The first ECT Tx is estimated to take place tomorrow 11/28/22. Pt is aware that he was accepted to the [facility] and is still interested in attending rehab post ECT Tx.  Pt is homeless and is in need of [facility] services in [place].  Pt is a [place] resident.  Pt has no current active insurance nor active benefits.  Pt has a nephew and mother in [place].  Pt's nephew is involved in Pt's care.  Pt continues to be depressed with hopeless, helpless thoughts. Pt has participated appropriately in phone screens with the residences that would accept Pt without insurance. Pt maintains communication with his nephew. Pt has participated appropriately in groups. Pt has agreed to ECT Tx .  The first ECT Tx took place 1/28/22. Pt has his third Tx yesterday.  Pt is estimated to need at ;east 6 ECT Txs prior to discharge. Pt is aware that he was accepted to the [facility] and is still interested in attending rehab post ECT Tx.

## 2022-01-07 PROCEDURE — 99232 SBSQ HOSP IP/OBS MODERATE 35: CPT

## 2022-01-07 RX ORDER — VENLAFAXINE HCL 75 MG
75 CAPSULE, EXT RELEASE 24 HR ORAL DAILY
Refills: 0 | Status: DISCONTINUED | OUTPATIENT
Start: 2022-01-07 | End: 2022-01-13

## 2022-01-07 RX ADMIN — Medication 325 MILLIGRAM(S): at 07:53

## 2022-01-07 RX ADMIN — Medication 100 MILLIGRAM(S): at 07:53

## 2022-01-07 RX ADMIN — Medication 37.5 MILLIGRAM(S): at 07:53

## 2022-01-07 RX ADMIN — Medication 50 MILLIGRAM(S): at 20:23

## 2022-01-07 RX ADMIN — DIVALPROEX SODIUM 1000 MILLIGRAM(S): 500 TABLET, DELAYED RELEASE ORAL at 20:22

## 2022-01-07 NOTE — BH INPATIENT PSYCHIATRY PROGRESS NOTE - NSBHCHARTREVIEWVS_PSY_A_CORE FT
Vital Signs Last 24 Hrs  T(C): 37.2 (01-07-22 @ 19:41), Max: 37.2 (01-07-22 @ 19:41)  T(F): 98.9 (01-07-22 @ 19:41), Max: 98.9 (01-07-22 @ 19:41)  HR: --  BP: --  BP(mean): --  RR: --  SpO2: --    Orthostatic VS  01-07-22 @ 19:41  Lying BP: --/-- HR: --  Sitting BP: 133/84 HR: 91  Standing BP: 134/90 HR: 105  Site: upper left arm  Mode: electronic  Orthostatic VS  01-07-22 @ 07:53  Lying BP: --/-- HR: --  Sitting BP: 142/88 HR: 90  Standing BP: --/-- HR: --  Site: --  Mode: --  Orthostatic VS  01-06-22 @ 19:53  Lying BP: --/-- HR: --  Sitting BP: 150/75 HR: 104  Standing BP: --/-- HR: --  Site: --  Mode: --  Orthostatic VS  01-06-22 @ 08:01  Lying BP: --/-- HR: --  Sitting BP: 125/89 HR: 91  Standing BP: --/-- HR: --  Site: --  Mode: --

## 2022-01-07 NOTE — BH INPATIENT PSYCHIATRY PROGRESS NOTE - CURRENT MEDICATION
MEDICATIONS  (STANDING):  diVALproex ER 1000 milliGRAM(s) Oral at bedtime  ferrous    sulfate 325 milliGRAM(s) Oral daily  thiamine 100 milliGRAM(s) Oral daily  venlafaxine XR 75 milliGRAM(s) Oral daily    MEDICATIONS  (PRN):  acetaminophen     Tablet .. 650 milliGRAM(s) Oral every 6 hours PRN Mild Pain (1 - 3), Moderate Pain (4 - 6)  aluminum hydroxide/magnesium hydroxide/simethicone Suspension 30 milliLiter(s) Oral every 4 hours PRN Dyspepsia  diphenhydrAMINE Injectable 50 milliGRAM(s) IntraMuscular once PRN agitation  haloperidol    Injectable 5 milliGRAM(s) IntraMuscular once PRN severe agitation  hydrOXYzine hydrochloride 50 milliGRAM(s) Oral every 6 hours PRN anxiety  OLANZapine 5 milliGRAM(s) Oral every 6 hours PRN agitation

## 2022-01-07 NOTE — BH INPATIENT PSYCHIATRY PROGRESS NOTE - NSBHASSESSSUMMFT_PSY_ALL_CORE
Pt remains in good behavioral control and much more cooperative and pleasant (though still intermittently irritable). Psychosis also remains improved and pt now denying AH, though he remains paranoid with persecutory and referential delusions (though without full delusional conviction, more in the realm of paranoid ideation). Pt remains depressed and hopeless with passive SI with regards to his uncertain future/dispo plan.     Plan:  1. Pt s/p Aristada loading on 12/30/21, next monthly Aristada 882mg due 1/27/22.   2. C/w Depakote 1000mg qhs.    3. Titrate Effexor to 75mg daily for depression (will plan to titrate further)  4. With regards to vague physical complaints - pt does not objectively appear unwell or unsteady, he is eating and drinking, and vitals and recent labs (including CBC, CMP, B12, folate, ammonia level) have been within normal limits. Pt's confusion and AMS that he initially presented with have also improved significantly. Pt remains on iron supplementation and Hb has been uptrending. Given that pt presented to [place] prior to current hospitalization with AMS, r/o dx include Wernicke's encephalopathy, however he was treated with IV thiamine during hospitalization at [place] and remains on oral supplementation. Will continue to monitor.

## 2022-01-07 NOTE — BH INPATIENT PSYCHIATRY PROGRESS NOTE - NSBHFUPINTERVALHXFT_PSY_A_CORE
No acute events overnight. Pt compliant with meds. Sleep was fair. Pt reports hearing voices talking outside his room at night and fear that someone might hurt or rape him. Remains depressed and anxious about his future, somewhat hopeless, with ongoing passive SI (denies intent or plan). Again asking if the hospital will "kick me out." Pt worried about relapsing on substances following discharge. States wish to be near his family.

## 2022-01-08 RX ADMIN — DIVALPROEX SODIUM 1000 MILLIGRAM(S): 500 TABLET, DELAYED RELEASE ORAL at 21:00

## 2022-01-08 RX ADMIN — Medication 650 MILLIGRAM(S): at 16:13

## 2022-01-08 RX ADMIN — Medication 325 MILLIGRAM(S): at 09:16

## 2022-01-08 RX ADMIN — Medication 100 MILLIGRAM(S): at 09:17

## 2022-01-08 RX ADMIN — Medication 75 MILLIGRAM(S): at 09:17

## 2022-01-09 RX ADMIN — Medication 100 MILLIGRAM(S): at 10:46

## 2022-01-09 RX ADMIN — DIVALPROEX SODIUM 1000 MILLIGRAM(S): 500 TABLET, DELAYED RELEASE ORAL at 20:15

## 2022-01-09 RX ADMIN — Medication 650 MILLIGRAM(S): at 16:43

## 2022-01-09 RX ADMIN — Medication 325 MILLIGRAM(S): at 10:46

## 2022-01-09 RX ADMIN — Medication 75 MILLIGRAM(S): at 10:46

## 2022-01-09 RX ADMIN — Medication 50 MILLIGRAM(S): at 20:16

## 2022-01-10 DIAGNOSIS — F10.20 ALCOHOL DEPENDENCE, UNCOMPLICATED: ICD-10-CM

## 2022-01-10 DIAGNOSIS — F19.10 OTHER PSYCHOACTIVE SUBSTANCE ABUSE, UNCOMPLICATED: ICD-10-CM

## 2022-01-10 LAB — SARS-COV-2 RNA SPEC QL NAA+PROBE: SIGNIFICANT CHANGE UP

## 2022-01-10 PROCEDURE — 99232 SBSQ HOSP IP/OBS MODERATE 35: CPT

## 2022-01-10 RX ADMIN — Medication 325 MILLIGRAM(S): at 08:30

## 2022-01-10 RX ADMIN — Medication 650 MILLIGRAM(S): at 14:29

## 2022-01-10 RX ADMIN — Medication 650 MILLIGRAM(S): at 15:29

## 2022-01-10 RX ADMIN — DIVALPROEX SODIUM 1000 MILLIGRAM(S): 500 TABLET, DELAYED RELEASE ORAL at 20:08

## 2022-01-10 RX ADMIN — Medication 100 MILLIGRAM(S): at 08:30

## 2022-01-10 RX ADMIN — Medication 75 MILLIGRAM(S): at 08:30

## 2022-01-10 NOTE — BH INPATIENT PSYCHIATRY PROGRESS NOTE - NSBHMSERECMEM_PSY_A_CORE
Unable to assess
Normal
Unable to assess

## 2022-01-10 NOTE — BH INPATIENT PSYCHIATRY PROGRESS NOTE - NSBHMSEAFFRANGE_PSY_A_CORE
Labile
Constricted
Labile
Labile
Constricted
Labile
Constricted
Labile
Unable to assess
Constricted

## 2022-01-10 NOTE — BH INPATIENT PSYCHIATRY PROGRESS NOTE - CURRENT MEDICATION
MEDICATIONS  (STANDING):  diVALproex ER 1000 milliGRAM(s) Oral at bedtime  ferrous    sulfate 325 milliGRAM(s) Oral daily  venlafaxine XR 75 milliGRAM(s) Oral daily    MEDICATIONS  (PRN):  acetaminophen     Tablet .. 650 milliGRAM(s) Oral every 6 hours PRN Mild Pain (1 - 3), Moderate Pain (4 - 6)  aluminum hydroxide/magnesium hydroxide/simethicone Suspension 30 milliLiter(s) Oral every 4 hours PRN Dyspepsia  diphenhydrAMINE Injectable 50 milliGRAM(s) IntraMuscular once PRN agitation  haloperidol    Injectable 5 milliGRAM(s) IntraMuscular once PRN severe agitation  hydrOXYzine hydrochloride 50 milliGRAM(s) Oral every 6 hours PRN anxiety  OLANZapine 5 milliGRAM(s) Oral every 6 hours PRN agitation

## 2022-01-10 NOTE — BH INPATIENT PSYCHIATRY PROGRESS NOTE - NSBHMSESPEECH_PSY_A_CORE
Abnormal as indicated, otherwise normal...
Abnormal as indicated, otherwise normal...
Normal volume, rate, productivity, spontaneity and articulation
Abnormal as indicated, otherwise normal...
Normal volume, rate, productivity, spontaneity and articulation
Normal volume, rate, productivity, spontaneity and articulation
Abnormal as indicated, otherwise normal...
Abnormal as indicated, otherwise normal...
Normal volume, rate, productivity, spontaneity and articulation
Normal volume, rate, productivity, spontaneity and articulation
Abnormal as indicated, otherwise normal...
Normal volume, rate, productivity, spontaneity and articulation
Abnormal as indicated, otherwise normal...
Normal volume, rate, productivity, spontaneity and articulation
Abnormal as indicated, otherwise normal...

## 2022-01-10 NOTE — BH INPATIENT PSYCHIATRY PROGRESS NOTE - NSBHMSEIMPULSE_PSY_A_CORE
Impaired
Impaired
Normal
Impaired
Normal
Impaired
Normal
Impaired
Impaired
Normal

## 2022-01-10 NOTE — BH INPATIENT PSYCHIATRY PROGRESS NOTE - LEVEL OF CONSCIOUSNESS
Alert
Lethargic, arousable to verbal stimulus
Alert

## 2022-01-10 NOTE — BH INPATIENT PSYCHIATRY PROGRESS NOTE - NSBHASSESSSUMMFT_PSY_ALL_CORE
Pt remains in good behavioral control and much more cooperative and pleasant (though still intermittently irritable). Psychosis also remains improved though pt remains paranoid with persecutory and referential delusions (though without full delusional conviction, more in the realm of paranoid ideation). Pt remains depressed and hopeless with passive SI with regards to his uncertain future/dispo plan, also endorsing guilt bordering on delusional/pathological. Now that AMS has cleared, pt presenting recently more with a psychotic depression.      Plan:  1. Pt s/p Aristada loading on 12/30/21, next monthly Aristada 882mg due 1/27/22.   2. C/w Depakote 1000mg qhs.    3. Titrate Effexor to 112.5mg daily for depression (will plan to titrate further)  4. Pt likely to benefit from ECT though due to insurance issues will hold off and continue with Effexor titration first   5. With regards to vague physical complaints - pt does not objectively appear unwell or unsteady, he is eating and drinking, and vitals and recent labs (including CBC, CMP, B12, folate, ammonia level) have been within normal limits. Pt's confusion and AMS that he initially presented with have also improved significantly. Pt remains on iron supplementation and Hb has been uptrending. Given that pt presented to [place] prior to current hospitalization with AMS, r/o dx include Wernicke's encephalopathy, however he was treated with IV thiamine during hospitalization at [place] (and oral supplementation just d/c). Will continue to monitor. Of note MMSE today at 27.   6. Dispo - to rehab in [place], pending placement

## 2022-01-10 NOTE — BH INPATIENT PSYCHIATRY PROGRESS NOTE - NSBHMSEMUSCLE_PSY_A_CORE
Unable to assess
Normal muscle tone/strength
Normal muscle tone/strength
Unable to assess

## 2022-01-10 NOTE — BH INPATIENT PSYCHIATRY PROGRESS NOTE - NSBHMSETHTCONTENT_PSY_A_CORE
Health Maintenance Due   Topic Date Due   • Colorectal Cancer Screen-  08/25/2021   • Medicare Wellness Visit  11/30/2021       Patient is due for topics as listed above but is not proceeding with Colorectal Cancer Screening: Colonoscopy and MWV (Medicare Wellness Visit) at this time. Education provided for Colorectal Cancer Screening: Colonoscopy and MWV (Medicare Wellness Visit).        
Other
Unable to assess
Other
Other
Ideas of reference/Suicidality/Other
Other
Other
Ideas of reference/Suicidality/Other
Ideas of reference/Suicidality/Other
Other
Other
Ideas of reference/Suicidality/Other

## 2022-01-10 NOTE — BH INPATIENT PSYCHIATRY PROGRESS NOTE - NSBHMSEBEHAV_PSY_A_CORE
Other
Other
Cooperative
Uncooperative
Cooperative
Other
Uncooperative
Other
Other
Uncooperative
Cooperative
Uncooperative

## 2022-01-10 NOTE — BH INPATIENT PSYCHIATRY PROGRESS NOTE - NSBHMSEAFFCONG_PSY_A_CORE
Non-congruent
Congruent
Unable to assess
Congruent
Non-congruent
Non-congruent
Congruent
Non-congruent
Congruent
Congruent
Non-congruent
Non-congruent
Congruent
Congruent
Non-congruent
Non-congruent

## 2022-01-10 NOTE — BH INPATIENT PSYCHIATRY PROGRESS NOTE - NSBHMSEPERCEPT_PSY_A_CORE
No abnormalities
Auditory hallucinations
Auditory hallucinations
No abnormalities
Auditory hallucinations
No abnormalities
No abnormalities
Other
Other
Auditory hallucinations
No abnormalities
Other
Auditory hallucinations
No abnormalities
No abnormalities
Unable to assess

## 2022-01-10 NOTE — BH INPATIENT PSYCHIATRY PROGRESS NOTE - NSBHMSEGAIT_PSY_A_CORE
Unable to assess
Normal gait / station
Normal gait / station
Unable to assess
Normal gait / station
Unable to assess
Normal gait / station
Unable to assess
Unable to assess
Normal gait / station
Unable to assess
Normal gait / station
Unable to assess
Unable to assess

## 2022-01-10 NOTE — BH INPATIENT PSYCHIATRY PROGRESS NOTE - NSBHMSEATTEN_PSY_A_CORE
Impaired
Normal
Impaired
Normal
Impaired
Normal
Impaired
Normal
Impaired
Normal
Impaired

## 2022-01-10 NOTE — BH INPATIENT PSYCHIATRY PROGRESS NOTE - NSBHMSETHTPROC_PSY_A_CORE
Disorganized/Impaired reasoning
Disorganized
Linear/Perseverative
Disorganized/Impaired reasoning
Linear/Impaired reasoning
Linear/Perseverative/Impaired reasoning
Disorganized/Impaired reasoning
Linear/Perseverative/Impaired reasoning
Disorganized/Impaired reasoning
Linear/Perseverative/Impaired reasoning
Linear/Perseverative/Impaired reasoning
Disorganized/Impaired reasoning
Disorganized/Impaired reasoning
Linear/Perseverative/Impaired reasoning
Disorganized/Impaired reasoning
Linear/Perseverative
Linear/Perseverative/Impaired reasoning
Linear/Perseverative/Impaired reasoning

## 2022-01-10 NOTE — BH INPATIENT PSYCHIATRY PROGRESS NOTE - NSBHPSYCHOLCOGORIENT_PSY_A_CORE
Unable to assess
Not fully oriented...
Unable to assess
Oriented to time, place, person, situation
Unable to assess
Oriented to time, place, person, situation
Oriented to time, place, person, situation
Unable to assess
Oriented to time, place, person, situation
Unable to assess
Oriented to time, place, person, situation
Unable to assess
Unable to assess
Oriented to time, place, person, situation
Unable to assess
Not fully oriented...
Unable to assess
Unable to assess

## 2022-01-10 NOTE — BH INPATIENT PSYCHIATRY PROGRESS NOTE - NSBHMSEMOOD_PSY_A_CORE
Normal/Unable to assess
Depressed/Anxious
Normal
Unable to assess
Depressed
Depressed/Anxious
Depressed
Depressed/Anxious/Irritable
Unable to assess
Depressed/Anxious/Irritable
Depressed/Anxious
Depressed
Depressed
Depressed/Anxious/Irritable
Depressed/Anxious/Irritable
Depressed
Depressed/Anxious/Irritable

## 2022-01-10 NOTE — BH INPATIENT PSYCHIATRY PROGRESS NOTE - NSBHMSEINTELL_PSY_A_CORE
Patient resting with dad   
Unable to assess
Average
Unable to assess

## 2022-01-10 NOTE — BH INPATIENT PSYCHIATRY PROGRESS NOTE - NSBHFUPINTERVALHXFT_PSY_A_CORE
No acute events over the weekend. Pt compliant with meds, sleeping well, and remains in good behavioral control. Still slightly irritable at times though overall remains quite cooperative. Pt reports feeling very depressed with ongoing passive SI (denies current suicidal intent or plan while hospitalized). Continues to repeatedly endorse fear/belief that hospital is kicking him out (believes he will be kicked out today). States that people (patients) have been outside his room (and sometimes inside his room) at night talking about sex and drugs, which is very distressing to him. Believes bad things will happen to him or that he will be harmed in some way and relates this to "being a bad person in the past." Reports feeling very guilty about his past. Continues to feel hopeless. Continues to report feeling "off" and dizzy at times.

## 2022-01-10 NOTE — BH INPATIENT PSYCHIATRY PROGRESS NOTE - NSBHMSETHTASSOC_PSY_A_CORE
Unable to assess
Unable to assess
Normal
Unable to assess
Normal

## 2022-01-10 NOTE — BH INPATIENT PSYCHIATRY PROGRESS NOTE - NSBHCHARTREVIEWVS_PSY_A_CORE FT
Vital Signs Last 24 Hrs  T(C): 36.2 (01-10-22 @ 08:08), Max: 36.7 (01-09-22 @ 18:53)  T(F): 97.1 (01-10-22 @ 08:08), Max: 98 (01-09-22 @ 18:53)  HR: --  BP: --  BP(mean): --  RR: --  SpO2: --    Orthostatic VS  01-10-22 @ 08:08  Lying BP: --/-- HR: --  Sitting BP: 133/85 HR: 92  Standing BP: --/-- HR: --  Site: --  Mode: --  Orthostatic VS  01-09-22 @ 08:08  Lying BP: --/-- HR: --  Sitting BP: 149/84 HR: 92  Standing BP: 147/80 HR: 101  Site: --  Mode: --  Orthostatic VS  01-08-22 @ 20:25  Lying BP: --/-- HR: --  Sitting BP: 141/86 HR: 90  Standing BP: 127/85 HR: 98  Site: --  Mode: --

## 2022-01-10 NOTE — BH INPATIENT PSYCHIATRY PROGRESS NOTE - NSBHMSEAFFQUAL_PSY_A_CORE
Depressed
Irritable
Depressed/Irritable
Irritable
Depressed/Irritable/Anxious
Euthymic
Depressed/Irritable/Anxious
Depressed/Irritable
Irritable
Irritable
Depressed/Irritable/Anxious
Depressed/Irritable/Anxious
Depressed/Irritable
Depressed
Irritable
Depressed/Irritable
Irritable
Unable to assess
Irritable
Irritable

## 2022-01-11 RX ADMIN — Medication 75 MILLIGRAM(S): at 08:38

## 2022-01-11 RX ADMIN — Medication 325 MILLIGRAM(S): at 08:38

## 2022-01-11 RX ADMIN — DIVALPROEX SODIUM 1000 MILLIGRAM(S): 500 TABLET, DELAYED RELEASE ORAL at 20:05

## 2022-01-11 NOTE — BH INPATIENT PSYCHIATRY PROGRESS NOTE - NSBHFUPINTERVALHXFT_PSY_A_CORE
No o/n events. Patient still compliant with medications and in good behavioral control. Patient much less irritable than yesterday and remains cooperative. Still depressed but hopeful with the eventuality of seeing his nephew and family. Not feeling like he wants to die today, but concerned about feeling like he felt on the streets, dirty and unclean. He continues endorsing fear/belief that hospital doesn't want him.    Concerned about numbness and soreness in hands as well as feeling of fatigue. Accepts that it could be related to how long he stays in bed during the day and wants to get the day started. Still endorses that he may be hurt in some way because of his past and that he can't escape his past. Continues to feel hopeless. Continues to report feeling dizzy at times and "drunk" when he drinks water.   No o/n events. Patient still compliant with medications and in good behavioral control. Patient much less irritable than yesterday and remains cooperative. Still depressed but hopeful with the eventuality of seeing his nephew and family. Not feeling like he wants to die today, but concerned about feeling like he felt on the streets, dirty and unclean. He continues endorsing fear/belief that hospital doesn't want him.    Concerned about numbness and soreness in hands as well as feeling of fatigue. Accepts that it could be related to how long he stays in bed during the day and wants to get the day started. Still endorses that he may be hurt in some way because of his past and that he can't escape his past. Continues to feel hopeless. Continues to report feeling dizzy at times and "drunk" when he drinks water.  Says that at night he hears peopling talking about sex, rape, and drugs and is worried he might be raped (though pt able to reality test).

## 2022-01-11 NOTE — BH INPATIENT PSYCHIATRY PROGRESS NOTE - NSBHASSESSSUMMFT_PSY_ALL_CORE
Pt remains in good behavioral control and much more cooperative and pleasant (though still intermittently irritable). Psychosis also remains improved though pt remains paranoid with persecutory and referential delusions (though without full delusional conviction, more in the realm of paranoid ideation). Pt remains depressed and hopeless with passive SI with regards to his uncertain future/dispo plan, also endorsing guilt bordering on delusional/pathological. Now that AMS has cleared, pt presenting recently more with a psychotic depression. Patient requests increasing Effexor dose (1/11) and is open to doing blood work to determine if cause is medical vs. psychiatric.     Plan:  1. Pt s/p Aristada loading on 12/30/21, next monthly Aristada 882mg due 1/27/22.   2. C/w Depakote 1000mg qhs.    3. Titrate Effexor to 112.5mg daily for depression (will plan to titrate further)  4. Pt likely to benefit from ECT though due to insurance issues will hold off and continue with Effexor titration first   5. With regards to vague physical complaints - pt does not objectively appear unwell or unsteady, pt. complains are also not consistent on physical exam. He is eating and drinking, and vitals and recent labs (including CBC, CMP, B12, folate, ammonia level) have been within normal limits. Pt's confusion and AMS that he initially presented with have also improved significantly. Pt remains on iron supplementation and Hb has been uptrending. Given that pt presented to [facility] prior to current hospitalization with AMS, r/o dx include Wernicke's encephalopathy, however he was treated with IV thiamine during hospitalization at [facility] (and oral supplementation just d/c). Will continue to monitor. Of note MMSE (1/10) at 27.  6. Dispo - to rehab in [place], pending placement       Pt remains in good behavioral control and much more cooperative and pleasant (though still intermittently irritable). Psychosis also remains improved though pt remains paranoid with persecutory and referential delusions (though without full delusional conviction, more in the realm of paranoid ideation). Pt remains depressed and hopeless with passive SI with regards to his uncertain future/dispo plan, also endorsing guilt bordering on delusional/pathological. Now that AMS has cleared, pt presenting recently more with a psychotic depression.    Plan:  1. Pt s/p Aristada loading on 12/30/21, next monthly Aristada 882mg due 1/27/22.   2. C/w Depakote 1000mg qhs.    3. C/w Effexor 112.5mg daily for depression (will plan to titrate further)  4. Pt likely to benefit from ECT though due to insurance issues will hold off and continue with Effexor titration first   5. With regards to vague physical complaints - pt does not objectively appear unwell or unsteady, pt. complains are also not consistent on physical exam. He is eating and drinking, and vitals and recent labs (including CBC, CMP, B12, folate, ammonia level) have been within normal limits. Pt's confusion and AMS that he initially presented with have also improved significantly. Pt remains on iron supplementation and Hb has been uptrending. Given that pt presented to [facility] prior to current hospitalization with AMS, r/o dx include Wernicke's encephalopathy, however he was treated with IV thiamine during hospitalization at [facility] (and oral supplementation just d/c). Will continue to monitor. Of note MMSE (1/10) at 27.  6. Dispo - to rehab in [place], pending placement

## 2022-01-11 NOTE — BH INPATIENT PSYCHIATRY PROGRESS NOTE - MSE UNSTRUCTURED FT
Patient is alert, oriented and well-groomed. Patient appears his stated age and has fair hygiene.     Patient makes good eye contact and has normal rate and volume of speech with normal motor activity. Gait is normal, though he describes dizziness on sitting up.  Patient is cooperative with interview. Patient mood is "depressed" and affect is congruent and potentially anxious and constricted. Patient has no memory impairment or attention impairment but describes "mental fatigue" on MMSE examination. Patient has passive suicidal ideation (thinks he'll die if he ends up back on the street) and no HI. No auditory, visual, or tactile hallucinations. Possibility of persecutory delusions about people trying to get him or trying to distract him, he's concerned about something in the water making him feel "drunk". Patient has poor/fair judgment regarding his behaviors when he leaves here. Insight is fair, he is aware that things are looking much improved since last week and wants to continue that improvement but does not believe he'll be able to leave.

## 2022-01-11 NOTE — BH INPATIENT PSYCHIATRY PROGRESS NOTE - NSBHMETABOLIC_PSY_ALL_CORE_FT
BMI:   HbA1c: A1C with Estimated Average Glucose Result: 5.4 % (12-21-21 @ 09:46)    Glucose: POCT Blood Glucose.: 95 mg/dL (11-30-21 @ 05:53)    BP: 133/81 (01-11-22 @ 08:35) (133/81 - 133/81)  Lipid Panel: Date/Time: 12-21-21 @ 09:46  Cholesterol, Serum: 148  Direct LDL: --  HDL Cholesterol, Serum: 40  Total Cholesterol/HDL Ration Measurement: --  Triglycerides, Serum: 55

## 2022-01-11 NOTE — BH INPATIENT PSYCHIATRY PROGRESS NOTE - NSBHCHARTREVIEWVS_PSY_A_CORE FT
Vital Signs Last 24 Hrs  T(C): 36.1 (01-11-22 @ 08:35), Max: 36.4 (01-10-22 @ 19:56)  T(F): 97 (01-11-22 @ 08:35), Max: 97.6 (01-10-22 @ 19:56)  HR: 89 (01-11-22 @ 08:35) (89 - 89)  BP: 133/81 (01-11-22 @ 08:35) (133/81 - 133/81)  BP(mean): --  RR: --  SpO2: --    Orthostatic VS  01-10-22 @ 19:56  Lying BP: --/-- HR: --  Sitting BP: 151/83 HR: 84  Standing BP: 145/81 HR: 96  Site: upper left arm  Mode: electronic  Orthostatic VS  01-10-22 @ 08:08  Lying BP: --/-- HR: --  Sitting BP: 133/85 HR: 92  Standing BP: --/-- HR: --  Site: --  Mode: --   Vital Signs Last 24 Hrs  T(C): 36.6 (01-11-22 @ 19:49), Max: 36.6 (01-11-22 @ 19:49)  T(F): 97.8 (01-11-22 @ 19:49), Max: 97.8 (01-11-22 @ 19:49)  HR: 89 (01-11-22 @ 08:35) (89 - 89)  BP: 133/81 (01-11-22 @ 08:35) (133/81 - 133/81)  BP(mean): --  RR: --  SpO2: --    Orthostatic VS  01-11-22 @ 19:49  Lying BP: --/-- HR: --  Sitting BP: 144/90 HR: 107  Standing BP: 148/94 HR: 112  Site: --  Mode: --  Orthostatic VS  01-10-22 @ 19:56  Lying BP: --/-- HR: --  Sitting BP: 151/83 HR: 84  Standing BP: 145/81 HR: 96  Site: upper left arm  Mode: electronic  Orthostatic VS  01-10-22 @ 08:08  Lying BP: --/-- HR: --  Sitting BP: 133/85 HR: 92  Standing BP: --/-- HR: --  Site: --  Mode: --

## 2022-01-11 NOTE — BH INPATIENT PSYCHIATRY PROGRESS NOTE - NSBHATTESTSEENBY_PSY_A_CORE
attending Psychiatrist without NP/Trainee Attending Psychiatrist supervising NP/Trainee, meeting pt... Improved

## 2022-01-11 NOTE — BH INPATIENT PSYCHIATRY PROGRESS NOTE - CASE SUMMARY
Pt remains depressed with ongoing paranoia and passive SI, though able to reality test and denies suicidal intent or plan. Pt slightly more hopeful about future today with emphasis placed on him returning home to his family. C/w Aristada, Depakote, and Effexor (plan to titrate further). Pt would likely benefit from ECT though given insurance issues will first continue with Effexor trial.

## 2022-01-12 PROCEDURE — 99232 SBSQ HOSP IP/OBS MODERATE 35: CPT

## 2022-01-12 RX ADMIN — Medication 325 MILLIGRAM(S): at 08:14

## 2022-01-12 RX ADMIN — Medication 75 MILLIGRAM(S): at 08:14

## 2022-01-12 RX ADMIN — DIVALPROEX SODIUM 1000 MILLIGRAM(S): 500 TABLET, DELAYED RELEASE ORAL at 20:10

## 2022-01-12 NOTE — BH INPATIENT PSYCHIATRY PROGRESS NOTE - NSBHASSESSSUMMFT_PSY_ALL_CORE
Pt remains in good behavioral control and much more cooperative and pleasant (though still intermittently irritable). Psychosis also remains improved though pt remains paranoid with persecutory and referential delusions (though without full delusional conviction, more in the realm of paranoid ideation). Pt remains depressed and hopeless with passive SI with regards to his uncertain future/dispo plan, also endorsing guilt bordering on delusional/pathological. Now that AMS has cleared, pt presenting recently more with a psychotic depression that has persisted for a couple of days though patient remains linear.    Plan:  1. Pt s/p Aristada loading on 12/30/21, next monthly Aristada 882mg due 1/27/22.   2. C/w Depakote 1000mg qhs.    3. C/w Effexor 112.5mg daily for depression (will plan to titrate further)  4. Pt likely to benefit from ECT though due to insurance issues will hold off and continue with Effexor titration first   5. With regards to vague physical complaints - pt does not objectively appear unwell or unsteady, pt. complains are also not consistent on physical exam. He is eating and drinking, and vitals and recent labs (including CBC, CMP, B12, folate, ammonia level) have been within normal limits. Pt's confusion and AMS that he initially presented with have also improved significantly. Pt remains on iron supplementation and Hb has been uptrending. Given that pt presented to [facility] prior to current hospitalization with AMS, r/o dx include Wernicke's encephalopathy, however he was treated with IV thiamine during hospitalization at [facility] (and oral supplementation just d/c). Will continue to monitor. Of note MMSE (1/10) at 27.  6. Dispo - to rehab in [place], pending placement       Pt remains in good behavioral control and much more cooperative and pleasant (though still intermittently irritable). Psychosis also remains improved though pt remains paranoid with persecutory and referential delusions (though without full delusional conviction, more in the realm of paranoid ideation). Pt remains depressed and hopeless with passive SI with regards to his uncertain future/dispo plan, also endorsing guilt bordering on delusional/pathological. Now that AMS has cleared, pt presenting recently more with a psychotic depression that has persisted for a couple of days though patient remains linear. Concern for frequent changes in neutral to depressed mood.     Plan:  1. Pt s/p Aristada loading on 12/30/21, next monthly Aristada 882mg due 1/27/22.   2. C/w Depakote 1000mg qhs.    3. C/w Effexor 112.5mg daily for depression (will plan to titrate further)  4. Pt likely to benefit from ECT though due to insurance issues will hold off and continue with Effexor titration first   5. With regards to vague physical complaints - pt does not objectively appear unwell or unsteady, pt. complains are also not consistent on physical exam. He is eating and drinking, and vitals and recent labs (including CBC, CMP, B12, folate, ammonia level) have been within normal limits. Pt's confusion and AMS that he initially presented with have also improved significantly. Pt remains on iron supplementation and Hb has been uptrending. Given that pt presented to [facility] prior to current hospitalization with AMS, r/o dx include Wernicke's encephalopathy, however he was treated with IV thiamine during hospitalization at [facility] (and oral supplementation just d/c). Will continue to monitor. Of note MMSE (1/10) at 27.  6. Dispo - to rehab in [place], pending placement       Pt remains in good behavioral control and much more cooperative and pleasant (though still intermittently irritable). Psychosis also remains improved though pt with ongoing persecutory and referential delusions (though without full delusional conviction, more in the realm of paranoid ideation). Pt remains depressed and hopeless with passive SI with regards to his uncertain future/dispo plan, also endorsing guilt bordering on delusional/pathological. Now that AMS has cleared, pt presenting recently more with a psychotic depression that has persisted for a couple of days though patient remains linear.     Plan:  1. Pt s/p Aristada loading on 12/30/21, next monthly Aristada 882mg due 1/27/22.   2. C/w Depakote 1000mg qhs.    3. C/w Effexor 112.5mg daily for depression (will plan to titrate further)  4. Pt likely to benefit from ECT though due to insurance issues will hold off and continue with Effexor titration first   5. With regards to vague physical complaints - pt does not objectively appear unwell or unsteady, pt. complains are also not consistent on physical exam. He is eating and drinking, and vitals and recent labs (including CBC, CMP, B12, folate, ammonia level) have been within normal limits. Pt's confusion and AMS that he initially presented with have also improved significantly. Pt remains on iron supplementation and Hb has been uptrending. Given that pt presented to [facility] prior to current hospitalization with AMS, r/o dx include Wernicke's encephalopathy, however he was treated with IV thiamine during hospitalization at [facility] (and oral supplementation just d/c). Will continue to monitor. Of note MMSE (1/10) at 27.  6. Dispo - to rehab in [place], pending placement

## 2022-01-12 NOTE — BH INPATIENT PSYCHIATRY PROGRESS NOTE - MSE UNSTRUCTURED FT
Patient is alert, oriented and well-groomed. Patient appears his stated age and has fair hygiene.     Patient makes good eye contact and has normal rate and volume of speech with normal motor activity. Gait is not assessed as patient remained in bed, Patient is cooperative with interview. Patient mood is "same as yesterday -depressed" and affect is congruent and still constricted. Patient feels like memory is "foggy" but on MMSE yesterday patient scored full points on memory testing. Patient continues with passive suicidal ideation (thinks he'll die if he ends up back on the street) but no HI. No auditory, visual, or tactile hallucinations.     Continued persecutory delusions, regarding fear of getting physically assaulted or raped both inside hospital and outside, but denies this is possible on reality testing. Patient has poor/fair judgment regarding his behaviors when he leaves here. Insight remains fair, is appreciative of being here instead of being outside in the cold as well as of some of the resources he's been given here. He acknowledges he's been sounding like he's been "complaining" more than intended.

## 2022-01-12 NOTE — BH INPATIENT PSYCHIATRY PROGRESS NOTE - CASE SUMMARY
Pt remains quite depressed with passive SI, +guilt, and self-loathing. Also remains paranoid and referential, believing he will be thrown out onto the street and that other patients here in the hospital wish to harm him. Despite this, he is in good behavioral control, denies active SI (intent or plan), and is agreeable to plan for discharge to rehab (with ultimate goal of returning to live with nephew and mother). Will c/w Aristada, Depakote, and Effexor titration. Pt could possibly benefit from ECT though unable to at this time due to insurance issues.

## 2022-01-12 NOTE — BH INPATIENT PSYCHIATRY PROGRESS NOTE - NSBHFUPINTERVALHXFT_PSY_A_CORE
No o/n events. Patient compliant with medications and in good behavioral control. Complains of people out to get him both outside his room and the hospital. Says that at night he hears peopling talking about sex, rape, and drugs and is worried he might be raped or physically assaulted (though pt able to reality test). Patient continues to be less irritable and remains cooperative. He continues endorsing fear/belief that hospital doesn't want him.     New concern about burning in toes and continued fatigue. Patient spends most of time in bed.    No o/n events. Patient compliant with medications and in good behavioral control. Complains of people out to get him both outside his room and the hospital. Says that at night he hears peopling talking about sex, rape, and drugs and is worried he might be raped or physically assaulted (though pt able to reality test). Patient continues to be less irritable and remains cooperative. He continues endorsing fear/belief that hospital doesn't want him.     New concern about burning in toes and continued fatigue. Patient spends most of time in bed. Reports feeling very depressed and worried for his safety. Says he "must have done something wrong" though he is unable to elaborate further on this. Continues to endorse belief that hospital is "kicking him out" today despite this not being the case.

## 2022-01-12 NOTE — BH INPATIENT PSYCHIATRY PROGRESS NOTE - NSBHCHARTREVIEWVS_PSY_A_CORE FT
Vital Signs Last 24 Hrs  T(C): 36.9 (01-12-22 @ 08:26), Max: 36.9 (01-12-22 @ 08:26)  T(F): 98.4 (01-12-22 @ 08:26), Max: 98.4 (01-12-22 @ 08:26)  HR: --  BP: --  BP(mean): --  RR: --  SpO2: --    Orthostatic VS  01-12-22 @ 08:26  Lying BP: --/-- HR: --  Sitting BP: 123/84 HR: 92  Standing BP: 126/91 HR: 101  Site: --  Mode: --  Orthostatic VS  01-11-22 @ 19:49  Lying BP: --/-- HR: --  Sitting BP: 144/90 HR: 107  Standing BP: 148/94 HR: 112  Site: --  Mode: --  Orthostatic VS  01-10-22 @ 19:56  Lying BP: --/-- HR: --  Sitting BP: 151/83 HR: 84  Standing BP: 145/81 HR: 96  Site: upper left arm  Mode: electronic

## 2022-01-13 PROCEDURE — 99232 SBSQ HOSP IP/OBS MODERATE 35: CPT

## 2022-01-13 RX ORDER — VENLAFAXINE HCL 75 MG
112.5 CAPSULE, EXT RELEASE 24 HR ORAL DAILY
Refills: 0 | Status: DISCONTINUED | OUTPATIENT
Start: 2022-01-13 | End: 2022-01-14

## 2022-01-13 RX ADMIN — Medication 325 MILLIGRAM(S): at 07:48

## 2022-01-13 RX ADMIN — Medication 75 MILLIGRAM(S): at 07:47

## 2022-01-13 RX ADMIN — DIVALPROEX SODIUM 1000 MILLIGRAM(S): 500 TABLET, DELAYED RELEASE ORAL at 20:11

## 2022-01-13 NOTE — BH INPATIENT PSYCHIATRY PROGRESS NOTE - NSBHFUPINTERVALHXFT_PSY_A_CORE
No o/n events. Patient compliant with medications and in good behavioral control. Maintains concerns about people being out to get him He continues endorsing fear/belief that hospital doesn't want him and "today is the day that I'll be kicked out". Patient understands this happens everyday but can't explain why he thinks this. Patient says that they're confused and upset in discussion with doctor, patient accepts that alleged psychosis may be related to depression, especially admits that he's "lonely". Depression continues to be main concern.     New concern about bodily fatigue and weakness/heaviness in arms. Patient continues to spend most of their time in bed. Reports feeling very depressed and worried for his safety. Does not deny that his time in bed could be related to his symptoms. Patient is able to reiterate plan for discharge (rehab -> back with family)   No o/n events. Patient compliant with medications and in good behavioral control. Maintains concerns about people being out to get him He continues endorsing fear/belief that hospital doesn't want him and "today is the day that I'll be kicked out". Patient understands this happens everyday but can't explain why he thinks this. Patient says that they're confused and upset in discussion with doctor, patient accepts that alleged psychosis may be related to depression, especially admits that he's "lonely". Depression continues to be main concern.     New concern about bodily fatigue and weakness/heaviness in arms. Says he feels like he's having a seizure. Patient continues to spend most of their time in bed. Reports feeling very depressed and worried for his safety. Does not deny that his time in bed could be related to his symptoms. Patient is able to reiterate plan for discharge (rehab -> back with family). Continues to report passive SI, denies intent or plan at this time.

## 2022-01-13 NOTE — BH INPATIENT PSYCHIATRY PROGRESS NOTE - NSBHASSESSSUMMFT_PSY_ALL_CORE
Both nebulizer solution and inhaler called in.    Pt remains in good behavioral control and much more cooperative and pleasant (though still intermittently irritable). Psychosis also remains improved though pt with ongoing persecutory and referential delusions (though without full delusional conviction, more in the realm of paranoid ideation). Pt remains depressed and hopeless with passive SI with regards to his uncertain future/dispo plan, also endorsing guilt bordering on delusional/pathological. Now that AMS has cleared, pt presenting recently more with a psychotic depression that has persisted for a couple of days though patient remains linear. Continuos reassurance and reality testing needed.     Plan:  1. Pt s/p Aristada loading on 12/30/21, next monthly Aristada 882mg due 1/27/22.   2. C/w Depakote 1000mg qhs.    3. C/w Effexor 112.5mg daily for depression (will plan to titrate further)  4. Pt likely to benefit from ECT though due to insurance issues will hold off and continue with Effexor titration first   5. With regards to vague physical complaints - pt does not objectively appear unwell or unsteady, pt. complains are also not consistent on physical exam. He is eating and drinking, and vitals and recent labs (including CBC, CMP, B12, folate, ammonia level) have been within normal limits. Pt's confusion and AMS that he initially presented with have also improved significantly. Pt remains on iron supplementation and Hb has been uptrending. Given that pt presented to [facility] prior to current hospitalization with AMS, r/o dx include Wernicke's encephalopathy, however he was treated with IV thiamine during hospitalization at [facility] (and oral supplementation just d/c). Will continue to monitor. Of note MMSE (1/10) at 27.  6. Dispo - to rehab in [place], pending placement       Pt remains in good behavioral control and much more cooperative and pleasant (though still intermittently irritable). Psychosis also remains improved though pt with ongoing persecutory and referential delusions (though without full delusional conviction, more in the realm of paranoid ideation). Pt remains depressed and hopeless with passive SI with regards to his uncertain future/dispo plan, also endorsing guilt bordering on delusional/pathological. Now that AMS has cleared, pt presenting recently more with a psychotic depression that has persisted. Team continues to provide reassurance and reality testing.     Plan:  1. Pt s/p Aristada loading on 12/30/21, next monthly Aristada 882mg due 1/27/22.   2. C/w Depakote 1000mg qhs.    3. Titrate Effexor to 150mgmg daily for depression (will plan to titrate further)  4. Pt likely to benefit from ECT though due to insurance issues will hold off and continue with Effexor titration first   5. With regards to vague physical complaints - pt does not objectively appear unwell or unsteady. He is eating and drinking, and vitals and recent labs (including CBC, CMP, B12, folate, ammonia level) have been within normal limits. Pt's confusion and AMS that he initially presented with have also improved significantly. Pt remains on iron supplementation and Hb has been uptrending. Given that pt presented to [facility] prior to current hospitalization with AMS, r/o dx include Wernicke's encephalopathy, however he was treated with IV thiamine during hospitalization at [facility] (and oral supplementation just recently d/c). Will continue to monitor. Of note MMSE (1/10) at 27.  6. Dispo - to rehab in [place], pending placement

## 2022-01-13 NOTE — BH INPATIENT PSYCHIATRY PROGRESS NOTE - MSE UNSTRUCTURED FT
Patient is alert, oriented and well-groomed. Patient appears his stated age and has fair hygiene.     Patient makes fair eye contact and has normal rate and volume of speech with normal motor activity. Gait is not assessed as patient remained in bed, Patient is cooperative with interview. Patient mood is "lonely" and affect is congruent and anxious. Patient feels like memory is still "foggy" but was able to answer all orientation and memory questions assessed. Patient continues with passive suicidal ideation but has no plan. No auditory, visual, or tactile hallucinations.     Continued persecutory delusions, regarding fear of getting physically assaulted or raped both inside hospital and outside, but denies this is possible on reality testing. Patient has poor/fair judgment regarding his behaviors when he leaves here. Insight remains fair, understands that some or all of his symptoms may be a result of his depressive symptoms. Pt is a 52yo man who appears his stated age, with good grooming and hygiene (much improved from admission). He is calm and cooperative though irritable at times. No psychomotor abnormalities noted. Speech is normal in rate and volume. Stated mood is "depressed, lonely." Affect is depressed, constricted, mood-congruent. TP is linear. TC notable for hopelessness, +guilt, passive SI (denies intent or plan), paranoid ideation (that at times borders on delusional in nature) with regards to being kicked out of the hospital and harmed by others, some ideas of reference, and somatic preoccupation. Perception: possible AH. Insight is limited. Judgement is fair. Impulse control is intact. Pt oriented to person, place, time, situation, current president (and previous two presidents), birthday, and age.

## 2022-01-13 NOTE — BH INPATIENT PSYCHIATRY PROGRESS NOTE - NSBHCHARTREVIEWVS_PSY_A_CORE FT
Vital Signs Last 24 Hrs  T(C): 36.6 (01-13-22 @ 08:06), Max: 37.1 (01-12-22 @ 18:53)  T(F): 97.9 (01-13-22 @ 08:06), Max: 98.7 (01-12-22 @ 18:53)  HR: --  BP: --  BP(mean): --  RR: --  SpO2: --    Orthostatic VS  01-13-22 @ 08:06  Lying BP: --/-- HR: --  Sitting BP: 142/88 HR: 84  Standing BP: --/-- HR: --  Site: --  Mode: --  Orthostatic VS  01-12-22 @ 18:53  Lying BP: --/-- HR: --  Sitting BP: 155/87 HR: 87  Standing BP: 154/86 HR: 98  Site: --  Mode: --  Orthostatic VS  01-12-22 @ 08:26  Lying BP: --/-- HR: --  Sitting BP: 123/84 HR: 92  Standing BP: 126/91 HR: 101  Site: --  Mode: --  Orthostatic VS  01-11-22 @ 19:49  Lying BP: --/-- HR: --  Sitting BP: 144/90 HR: 107  Standing BP: 148/94 HR: 112  Site: --  Mode: --   Vital Signs Last 24 Hrs  T(C): 36.5 (01-13-22 @ 19:09), Max: 36.6 (01-13-22 @ 08:06)  T(F): 97.7 (01-13-22 @ 19:09), Max: 97.9 (01-13-22 @ 08:06)  HR: --  BP: --  BP(mean): --  RR: --  SpO2: --    Orthostatic VS  01-13-22 @ 19:09  Lying BP: --/-- HR: --  Sitting BP: 130/76 HR: 88  Standing BP: 128/75 HR: 102  Site: upper left arm  Mode: electronic  Orthostatic VS  01-13-22 @ 08:06  Lying BP: --/-- HR: --  Sitting BP: 142/88 HR: 84  Standing BP: --/-- HR: --  Site: --  Mode: --  Orthostatic VS  01-12-22 @ 18:53  Lying BP: --/-- HR: --  Sitting BP: 155/87 HR: 87  Standing BP: 154/86 HR: 98  Site: --  Mode: --  Orthostatic VS  01-12-22 @ 08:26  Lying BP: --/-- HR: --  Sitting BP: 123/84 HR: 92  Standing BP: 126/91 HR: 101  Site: --  Mode: --  Orthostatic VS  01-11-22 @ 19:49  Lying BP: --/-- HR: --  Sitting BP: 144/90 HR: 107  Standing BP: 148/94 HR: 112  Site: --  Mode: --

## 2022-01-14 LAB — SARS-COV-2 RNA SPEC QL NAA+PROBE: SIGNIFICANT CHANGE UP

## 2022-01-14 PROCEDURE — 99232 SBSQ HOSP IP/OBS MODERATE 35: CPT

## 2022-01-14 RX ORDER — VENLAFAXINE HCL 75 MG
150 CAPSULE, EXT RELEASE 24 HR ORAL DAILY
Refills: 0 | Status: DISCONTINUED | OUTPATIENT
Start: 2022-01-17 | End: 2022-01-19

## 2022-01-14 RX ORDER — VENLAFAXINE HCL 75 MG
112.5 CAPSULE, EXT RELEASE 24 HR ORAL DAILY
Refills: 0 | Status: COMPLETED | OUTPATIENT
Start: 2022-01-15 | End: 2022-01-16

## 2022-01-14 RX ORDER — LOPERAMIDE HCL 2 MG
2 TABLET ORAL EVERY 4 HOURS
Refills: 0 | Status: DISCONTINUED | OUTPATIENT
Start: 2022-01-14 | End: 2022-02-15

## 2022-01-14 RX ADMIN — Medication 112.5 MILLIGRAM(S): at 08:06

## 2022-01-14 RX ADMIN — Medication 325 MILLIGRAM(S): at 08:06

## 2022-01-14 RX ADMIN — Medication 2 MILLIGRAM(S): at 10:36

## 2022-01-14 RX ADMIN — DIVALPROEX SODIUM 1000 MILLIGRAM(S): 500 TABLET, DELAYED RELEASE ORAL at 20:20

## 2022-01-14 NOTE — BH INPATIENT PSYCHIATRY PROGRESS NOTE - NSBHCHARTREVIEWVS_PSY_A_CORE FT
Vital Signs Last 24 Hrs  T(C): 36.8 (01-14-22 @ 07:47), Max: 36.8 (01-14-22 @ 07:47)  T(F): 98.2 (01-14-22 @ 07:47), Max: 98.2 (01-14-22 @ 07:47)  HR: --  BP: --  BP(mean): --  RR: --  SpO2: --    Orthostatic VS  01-14-22 @ 07:47  Lying BP: --/-- HR: --  Sitting BP: 127/80 HR: 87  Standing BP: 128/86 HR: 101  Site: --  Mode: --  Orthostatic VS  01-13-22 @ 19:09  Lying BP: --/-- HR: --  Sitting BP: 130/76 HR: 88  Standing BP: 128/75 HR: 102  Site: upper left arm  Mode: electronic  Orthostatic VS  01-13-22 @ 08:06  Lying BP: --/-- HR: --  Sitting BP: 142/88 HR: 84  Standing BP: --/-- HR: --  Site: --  Mode: --  Orthostatic VS  01-12-22 @ 18:53  Lying BP: --/-- HR: --  Sitting BP: 155/87 HR: 87  Standing BP: 154/86 HR: 98  Site: --  Mode: --   Vital Signs Last 24 Hrs  T(C): 36.6 (01-14-22 @ 19:35), Max: 36.8 (01-14-22 @ 07:47)  T(F): 97.9 (01-14-22 @ 19:35), Max: 98.2 (01-14-22 @ 07:47)  HR: --  BP: --  BP(mean): --  RR: --  SpO2: --    Orthostatic VS  01-14-22 @ 19:35  Lying BP: --/-- HR: --  Sitting BP: 134/84 HR: 95  Standing BP: 135/80 HR: 106  Site: --  Mode: --  Orthostatic VS  01-14-22 @ 07:47  Lying BP: --/-- HR: --  Sitting BP: 127/80 HR: 87  Standing BP: 128/86 HR: 101  Site: --  Mode: --  Orthostatic VS  01-13-22 @ 19:09  Lying BP: --/-- HR: --  Sitting BP: 130/76 HR: 88  Standing BP: 128/75 HR: 102  Site: upper left arm  Mode: electronic  Orthostatic VS  01-13-22 @ 08:06  Lying BP: --/-- HR: --  Sitting BP: 142/88 HR: 84  Standing BP: --/-- HR: --  Site: --  Mode: --

## 2022-01-14 NOTE — BH INPATIENT PSYCHIATRY PROGRESS NOTE - MSE UNSTRUCTURED FT
Patient is a 50yo man who appears his stated age. He is alert and oriented to person, place, and time with good grooming and hygiene. Patient remains in bed during interview and is calm and cooperative and much less irritable than the past few days. No psychomotor abnormalities noted. Patient's speech has normal volume, tone, and rate.     Patient says his mood is "scared, nervous". Affect is depressed/anxious, mood congruent. Thought process is linear. Thought content positive for passive SI (denies intent or plan), continued paranoid ideation about being harmed by others, somatic preoccupation, and reduced hopelessness compared to yesterday. Perception: Denies auditory, tactile, or visual hallucinations. Judgment is fair and mood dependent, insight is poor, impulse control remains intact.  Patient is a 50yo man who appears his stated age. He is alert and oriented to person, place, and time with good grooming and hygiene. Patient remains in bed during interview and is calm and cooperative and much less irritable than the past few days. No psychomotor abnormalities noted. Patient's speech has normal volume, tone, and rate. Patient says his mood is "scared, nervous". Affect is depressed/anxious, mood congruent. Thought process is linear. Thought content positive for passive SI (denies intent or plan), continued paranoid ideation about being harmed by others, somatic preoccupation, and reduced hopelessness compared to yesterday. Perception: Denies auditory, tactile, or visual hallucinations. Insight and judgment are fair. Impulse control remains intact.

## 2022-01-14 NOTE — BH INPATIENT PSYCHIATRY PROGRESS NOTE - NSBHFUPINTERVALHXFT_PSY_A_CORE
No o/n events. Patient compliant with medications and in good behavioral control. Patient spoke with insurance team and family yesterday, both of which he says has improved his mood. Patient still endorses the possibility of him ending up back on the street resulting in him dying, but is more hopeful today that it won't end this way, compared to previously. When discussing previous memories of better times and hopes of a better future, patient expresses emotion and feels more hopeful.     Patient is concerned about ending up in a shelter, but after reassurance from doctor, understands that is not the plan. Patient also agrees with uptitration of venlafaxine. Patient still expresses vague concerns about general weakness and tingling in hands, now unilateral instead of bilateral and patient says they don't understand why this is happening but understands most recent bloodwork and tests are all normal. Patient continues to spend most of their time in bed.     Continues to report passive SI, denies intent or plan at this time. Denies HI or hallucinations. Says that sounds outside concern him, but realizes that they are real sounds and just part of the general ongoings of cleaning staff.

## 2022-01-14 NOTE — BH INPATIENT PSYCHIATRY PROGRESS NOTE - CURRENT MEDICATION
MEDICATIONS  (STANDING):  diVALproex ER 1000 milliGRAM(s) Oral at bedtime  ferrous    sulfate 325 milliGRAM(s) Oral daily  venlafaxine .5 milliGRAM(s) Oral daily    MEDICATIONS  (PRN):  acetaminophen     Tablet .. 650 milliGRAM(s) Oral every 6 hours PRN Mild Pain (1 - 3), Moderate Pain (4 - 6)  aluminum hydroxide/magnesium hydroxide/simethicone Suspension 30 milliLiter(s) Oral every 4 hours PRN Dyspepsia  diphenhydrAMINE Injectable 50 milliGRAM(s) IntraMuscular once PRN agitation  haloperidol    Injectable 5 milliGRAM(s) IntraMuscular once PRN severe agitation  hydrOXYzine hydrochloride 50 milliGRAM(s) Oral every 6 hours PRN anxiety  OLANZapine 5 milliGRAM(s) Oral every 6 hours PRN agitation   MEDICATIONS  (STANDING):  diVALproex ER 1000 milliGRAM(s) Oral at bedtime  ferrous    sulfate 325 milliGRAM(s) Oral daily    MEDICATIONS  (PRN):  acetaminophen     Tablet .. 650 milliGRAM(s) Oral every 6 hours PRN Mild Pain (1 - 3), Moderate Pain (4 - 6)  aluminum hydroxide/magnesium hydroxide/simethicone Suspension 30 milliLiter(s) Oral every 4 hours PRN Dyspepsia  diphenhydrAMINE Injectable 50 milliGRAM(s) IntraMuscular once PRN agitation  haloperidol    Injectable 5 milliGRAM(s) IntraMuscular once PRN severe agitation  hydrOXYzine hydrochloride 50 milliGRAM(s) Oral every 6 hours PRN anxiety  loperamide 2 milliGRAM(s) Oral every 4 hours PRN diarrhea  OLANZapine 5 milliGRAM(s) Oral every 6 hours PRN agitation

## 2022-01-14 NOTE — BH INPATIENT PSYCHIATRY PROGRESS NOTE - PRN MEDS
MEDICATIONS  (PRN):  acetaminophen     Tablet .. 650 milliGRAM(s) Oral every 6 hours PRN Mild Pain (1 - 3), Moderate Pain (4 - 6)  aluminum hydroxide/magnesium hydroxide/simethicone Suspension 30 milliLiter(s) Oral every 4 hours PRN Dyspepsia  diphenhydrAMINE Injectable 50 milliGRAM(s) IntraMuscular once PRN agitation  haloperidol    Injectable 5 milliGRAM(s) IntraMuscular once PRN severe agitation  hydrOXYzine hydrochloride 50 milliGRAM(s) Oral every 6 hours PRN anxiety  OLANZapine 5 milliGRAM(s) Oral every 6 hours PRN agitation   MEDICATIONS  (PRN):  acetaminophen     Tablet .. 650 milliGRAM(s) Oral every 6 hours PRN Mild Pain (1 - 3), Moderate Pain (4 - 6)  aluminum hydroxide/magnesium hydroxide/simethicone Suspension 30 milliLiter(s) Oral every 4 hours PRN Dyspepsia  diphenhydrAMINE Injectable 50 milliGRAM(s) IntraMuscular once PRN agitation  haloperidol    Injectable 5 milliGRAM(s) IntraMuscular once PRN severe agitation  hydrOXYzine hydrochloride 50 milliGRAM(s) Oral every 6 hours PRN anxiety  loperamide 2 milliGRAM(s) Oral every 4 hours PRN diarrhea  OLANZapine 5 milliGRAM(s) Oral every 6 hours PRN agitation

## 2022-01-14 NOTE — BH INPATIENT PSYCHIATRY PROGRESS NOTE - NSBHASSESSSUMMFT_PSY_ALL_CORE
Pt remains in good behavioral control and much more cooperative and pleasant (though still intermittently irritable). Psychosis also remains improved though pt with ongoing persecutory and referential delusions (though without full delusional conviction, more in the realm of paranoid ideation). Pt remains depressed and hopeless with passive SI with regards to his uncertain future/dispo plan. Now that AMS has cleared, pt presenting recently more with a psychotic depression that has persisted. Team continues to provide reassurance and reality testing. Patient more hopeful after positive discussions with insurance team and family.      Plan:  1. Pt s/p Aristada loading on 12/30/21, next monthly Aristada 882mg due 1/27/22.   2. C/w Depakote 1000mg qhs.    3. Titrate Effexor to 150mg daily for depression (will plan to titrate further)  4. Pt likely to benefit from ECT though due to insurance issues will hold off and continue with Effexor titration first   5. With regards to vague physical complaints - pt does not objectively appear unwell or unsteady. He is eating and drinking, and vitals and recent labs (including CBC, CMP, B12, folate, ammonia level) have been within normal limits. Pt's confusion and AMS that he initially presented with have also improved significantly. Pt remains on iron supplementation and Hb has been uptrending. Given that pt presented to [facility] prior to current hospitalization with AMS, r/o dx include Wernicke's encephalopathy, however he was treated with IV thiamine during hospitalization at [facility] (and oral supplementation just recently d/c). Will continue to monitor. Of note MMSE (1/10) at 27.  6. Dispo - to rehab in [place], pending placement       Pt remains in good behavioral control and much more cooperative and pleasant (though still intermittently irritable). Psychosis also remains improved though pt with ongoing persecutory and referential delusions (though without full delusional conviction, more in the realm of paranoid ideation). Pt remains depressed with passive conditional SI though appearing more hopeful today. Now that AMS has cleared, pt presenting recently more with a psychotic depression that has persisted. Team continues to provide reassurance and reality testing.      Plan:  1. Pt s/p Aristada loading on 12/30/21, next monthly Aristada 882mg due 1/27/22.   2. C/w Depakote 1000mg qhs.    3. C/w Effexor .5mg daily and titrate to 150mg daily over the weekend  4. Pt likely to benefit from ECT though due to insurance issues will hold off and continue with Effexor titration first   5. With regards to vague physical complaints - pt does not objectively appear unwell or unsteady. He is eating and drinking, and vitals and recent labs (including CBC, CMP, B12, folate, ammonia level) have been within normal limits. Pt's confusion and AMS that he initially presented with have also improved significantly/resolved. Pt remains on iron supplementation and Hb has been uptrending. Given that pt presented to [facility] prior to current hospitalization with AMS, r/o dx include Wernicke's encephalopathy, however he was treated with IV thiamine during hospitalization at [facility] (and oral supplementation just recently d/c). Will continue to monitor. Of note MMSE (1/10) at 27.  6. Dispo - to rehab in [place], pending placement

## 2022-01-15 RX ADMIN — DIVALPROEX SODIUM 1000 MILLIGRAM(S): 500 TABLET, DELAYED RELEASE ORAL at 21:09

## 2022-01-15 RX ADMIN — Medication 325 MILLIGRAM(S): at 08:21

## 2022-01-15 RX ADMIN — Medication 112.5 MILLIGRAM(S): at 08:21

## 2022-01-16 RX ADMIN — DIVALPROEX SODIUM 1000 MILLIGRAM(S): 500 TABLET, DELAYED RELEASE ORAL at 20:35

## 2022-01-16 RX ADMIN — Medication 112.5 MILLIGRAM(S): at 07:50

## 2022-01-16 RX ADMIN — Medication 2 MILLIGRAM(S): at 09:30

## 2022-01-16 RX ADMIN — Medication 325 MILLIGRAM(S): at 07:50

## 2022-01-16 RX ADMIN — Medication 50 MILLIGRAM(S): at 11:42

## 2022-01-17 RX ADMIN — Medication 150 MILLIGRAM(S): at 08:00

## 2022-01-17 RX ADMIN — Medication 325 MILLIGRAM(S): at 08:00

## 2022-01-17 RX ADMIN — DIVALPROEX SODIUM 1000 MILLIGRAM(S): 500 TABLET, DELAYED RELEASE ORAL at 20:14

## 2022-01-18 LAB — SARS-COV-2 RNA SPEC QL NAA+PROBE: SIGNIFICANT CHANGE UP

## 2022-01-18 PROCEDURE — 99232 SBSQ HOSP IP/OBS MODERATE 35: CPT

## 2022-01-18 RX ADMIN — Medication 50 MILLIGRAM(S): at 20:40

## 2022-01-18 RX ADMIN — DIVALPROEX SODIUM 1000 MILLIGRAM(S): 500 TABLET, DELAYED RELEASE ORAL at 20:40

## 2022-01-18 RX ADMIN — Medication 325 MILLIGRAM(S): at 08:53

## 2022-01-18 RX ADMIN — Medication 150 MILLIGRAM(S): at 08:53

## 2022-01-18 NOTE — BH INPATIENT PSYCHIATRY PROGRESS NOTE - NSBHCHARTREVIEWVS_PSY_A_CORE FT
Vital Signs Last 24 Hrs  T(C): 36.7 (01-17-22 @ 19:30), Max: 36.7 (01-17-22 @ 19:30)  T(F): 98.1 (01-17-22 @ 19:30), Max: 98.1 (01-17-22 @ 19:30)  HR: --  BP: --  BP(mean): --  RR: --  SpO2: --    Orthostatic VS  01-17-22 @ 19:30  Lying BP: --/-- HR: --  Sitting BP: 132/77 HR: 81  Standing BP: 132/88 HR: 99  Site: upper left arm  Mode: electronic  Orthostatic VS  01-17-22 @ 08:59  Lying BP: --/-- HR: --  Sitting BP: 101/69 HR: 83  Standing BP: 131/83 HR: 89  Site: --  Mode: --  Orthostatic VS  01-16-22 @ 19:29  Lying BP: --/-- HR: --  Sitting BP: 143/81 HR: 86  Standing BP: 132/85 HR: 100  Site: upper left arm  Mode: electronic   Vital Signs Last 24 Hrs  T(C): 37 (01-18-22 @ 19:19), Max: 37 (01-18-22 @ 19:19)  T(F): 98.6 (01-18-22 @ 19:19), Max: 98.6 (01-18-22 @ 19:19)  HR: --  BP: --  BP(mean): --  RR: --  SpO2: --    Orthostatic VS  01-18-22 @ 19:19  Lying BP: --/-- HR: --  Sitting BP: 136/80 HR: 96  Standing BP: 130/86 HR: 107  Site: upper left arm  Mode: electronic  Orthostatic VS  01-17-22 @ 19:30  Lying BP: --/-- HR: --  Sitting BP: 132/77 HR: 81  Standing BP: 132/88 HR: 99  Site: upper left arm  Mode: electronic  Orthostatic VS  01-17-22 @ 08:59  Lying BP: --/-- HR: --  Sitting BP: 101/69 HR: 83  Standing BP: 131/83 HR: 89  Site: --  Mode: --

## 2022-01-18 NOTE — BH INPATIENT PSYCHIATRY PROGRESS NOTE - MSE UNSTRUCTURED FT
Patient is a 50yo man who appears his stated age. He is alert and oriented to person, place, and time with fair grooming and hygiene. In bed during interview and is calm and cooperative, not irritable. Eye contact is fair. Patient's speech has normal volume, tone, and rate. Patient says his mood "varies". Affect is depressed/anxious, mood congruent. Thought process is linear. Thought content positive for passive SI (denies intent or plan), continued paranoid ideation about being harmed by others and somatic preoccupations. Perception: Denies auditory, tactile, or visual hallucinations. Insight and judgment are fair. Impulse control remains intact.

## 2022-01-18 NOTE — BH INPATIENT PSYCHIATRY PROGRESS NOTE - CURRENT MEDICATION
MEDICATIONS  (STANDING):  diVALproex ER 1000 milliGRAM(s) Oral at bedtime  ferrous    sulfate 325 milliGRAM(s) Oral daily  venlafaxine  milliGRAM(s) Oral daily    MEDICATIONS  (PRN):  acetaminophen     Tablet .. 650 milliGRAM(s) Oral every 6 hours PRN Mild Pain (1 - 3), Moderate Pain (4 - 6)  aluminum hydroxide/magnesium hydroxide/simethicone Suspension 30 milliLiter(s) Oral every 4 hours PRN Dyspepsia  diphenhydrAMINE Injectable 50 milliGRAM(s) IntraMuscular once PRN agitation  haloperidol    Injectable 5 milliGRAM(s) IntraMuscular once PRN severe agitation  hydrOXYzine hydrochloride 50 milliGRAM(s) Oral every 6 hours PRN anxiety  loperamide 2 milliGRAM(s) Oral every 4 hours PRN diarrhea  OLANZapine 5 milliGRAM(s) Oral every 6 hours PRN agitation

## 2022-01-18 NOTE — BH INPATIENT PSYCHIATRY PROGRESS NOTE - NSBHASSESSSUMMFT_PSY_ALL_CORE
Pt remains in good behavioral control and is cooperative and pleasant. Psychosis also remains improved though pt with ongoing persecutory and referential delusions (though without full delusional conviction, more in the realm of paranoid ideation). Pt remains depressed with passive conditional SI though appearing more hopeful today. Now that AMS has cleared, pt presenting recently more with a psychotic depression that has persisted. Team continues to provide reassurance and reality testing.      Plan:  1. Pt s/p Aristada loading on 12/30/21, next monthly Aristada 882mg due 1/27/22.   2. C/w Depakote 1000mg qhs.    3. C/w Effexor XR 150mg daily  4. With regards to vague physical complaints - pt does not objectively appear unwell or unsteady. He is eating and drinking, and vitals and recent labs (including CBC, CMP, B12, folate, ammonia level) have been within normal limits. Pt's confusion and AMS that he initially presented with have also improved significantly/resolved. Pt remains on iron supplementation and Hb has been uptrending. Given that pt presented to [place] prior to current hospitalization with AMS, r/o dx include Wernicke's encephalopathy, however he was treated with IV thiamine during hospitalization at [place] (and oral supplementation just recently d/c). Will continue to monitor. Of note MMSE (1/10) at 27.  5. Dispo - to rehab in [place], pending placement

## 2022-01-18 NOTE — BH INPATIENT PSYCHIATRY PROGRESS NOTE - NSBHFUPINTERVALHXFT_PSY_A_CORE
No o/n events. Patient compliant with medications and in good behavioral control. Pt. still endorsing hopelessness and paranoia, stating that he has a feeling that someone wants to "beat him up." Says that his mood varies from time to time. Pt continues to be concerned that he will end up in a shelter but feels more hopeful for current plan. Denies side effects to venlafaxine. Patient continues to express vague concerns about general weakness and tingling in hands. Continues to report passive SI, denies intent or plan at this time. Denies HI or hallucinations.

## 2022-01-18 NOTE — BH INPATIENT PSYCHIATRY PROGRESS NOTE - PRN MEDS
MEDICATIONS  (PRN):  acetaminophen     Tablet .. 650 milliGRAM(s) Oral every 6 hours PRN Mild Pain (1 - 3), Moderate Pain (4 - 6)  aluminum hydroxide/magnesium hydroxide/simethicone Suspension 30 milliLiter(s) Oral every 4 hours PRN Dyspepsia  diphenhydrAMINE Injectable 50 milliGRAM(s) IntraMuscular once PRN agitation  haloperidol    Injectable 5 milliGRAM(s) IntraMuscular once PRN severe agitation  hydrOXYzine hydrochloride 50 milliGRAM(s) Oral every 6 hours PRN anxiety  loperamide 2 milliGRAM(s) Oral every 4 hours PRN diarrhea  OLANZapine 5 milliGRAM(s) Oral every 6 hours PRN agitation

## 2022-01-18 NOTE — BH INPATIENT PSYCHIATRY PROGRESS NOTE - CASE SUMMARY
Pt remains depressed with passive SI, paranoid ideation about being harmed by others, and some ideas of reference, though overall appearing more hopeful and paranoia is less intense. Pt remains motivated to attend rehab after discharge and then return to his family. Pt accepted of reassurance and is able to reality test. Remains with vague somatic complaints that are likely more related to anxiety. C/w Aristada, Depakote, and Effexor (with plan to titrate further). Dispo to rehab, pending acceptance.

## 2022-01-19 PROCEDURE — 99232 SBSQ HOSP IP/OBS MODERATE 35: CPT

## 2022-01-19 RX ORDER — VENLAFAXINE HCL 75 MG
187.5 CAPSULE, EXT RELEASE 24 HR ORAL DAILY
Refills: 0 | Status: DISCONTINUED | OUTPATIENT
Start: 2022-01-20 | End: 2022-01-24

## 2022-01-19 RX ORDER — HYDROXYZINE HCL 10 MG
50 TABLET ORAL
Refills: 0 | Status: DISCONTINUED | OUTPATIENT
Start: 2022-01-19 | End: 2022-02-15

## 2022-01-19 RX ADMIN — Medication 50 MILLIGRAM(S): at 12:32

## 2022-01-19 RX ADMIN — DIVALPROEX SODIUM 1000 MILLIGRAM(S): 500 TABLET, DELAYED RELEASE ORAL at 20:16

## 2022-01-19 RX ADMIN — Medication 150 MILLIGRAM(S): at 08:31

## 2022-01-19 RX ADMIN — Medication 325 MILLIGRAM(S): at 08:31

## 2022-01-19 RX ADMIN — Medication 50 MILLIGRAM(S): at 20:16

## 2022-01-19 NOTE — BH INPATIENT PSYCHIATRY PROGRESS NOTE - PRN MEDS
MEDICATIONS  (PRN):  acetaminophen     Tablet .. 650 milliGRAM(s) Oral every 6 hours PRN Mild Pain (1 - 3), Moderate Pain (4 - 6)  aluminum hydroxide/magnesium hydroxide/simethicone Suspension 30 milliLiter(s) Oral every 4 hours PRN Dyspepsia  diphenhydrAMINE Injectable 50 milliGRAM(s) IntraMuscular once PRN agitation  haloperidol    Injectable 5 milliGRAM(s) IntraMuscular once PRN severe agitation  hydrOXYzine hydrochloride 50 milliGRAM(s) Oral every 6 hours PRN anxiety  loperamide 2 milliGRAM(s) Oral every 4 hours PRN diarrhea  OLANZapine 5 milliGRAM(s) Oral every 6 hours PRN agitation   MEDICATIONS  (PRN):  acetaminophen     Tablet .. 650 milliGRAM(s) Oral every 6 hours PRN Mild Pain (1 - 3), Moderate Pain (4 - 6)  aluminum hydroxide/magnesium hydroxide/simethicone Suspension 30 milliLiter(s) Oral every 4 hours PRN Dyspepsia  diphenhydrAMINE Injectable 50 milliGRAM(s) IntraMuscular once PRN agitation  haloperidol    Injectable 5 milliGRAM(s) IntraMuscular once PRN severe agitation  hydrOXYzine hydrochloride 50 milliGRAM(s) Oral every 6 hours PRN anxiety  loperamide 2 milliGRAM(s) Oral every 4 hours PRN diarrhea  LORazepam   Injectable 2 milliGRAM(s) IntraMuscular once PRN severe agitation  OLANZapine 5 milliGRAM(s) Oral every 6 hours PRN agitation

## 2022-01-19 NOTE — BH INPATIENT PSYCHIATRY PROGRESS NOTE - NSBHCHARTREVIEWVS_PSY_A_CORE FT
Vital Signs Last 24 Hrs  T(C): 36.6 (01-19-22 @ 06:56), Max: 37 (01-18-22 @ 19:19)  T(F): 97.8 (01-19-22 @ 06:56), Max: 98.6 (01-18-22 @ 19:19)  HR: --  BP: --  BP(mean): --  RR: --  SpO2: --    Orthostatic VS  01-19-22 @ 06:56  Lying BP: --/-- HR: --  Sitting BP: 121/79 HR: 71  Standing BP: --/-- HR: --  Site: --  Mode: --  Orthostatic VS  01-19-22 @ 06:54  Lying BP: --/-- HR: --  Sitting BP: 121/79 HR: 71  Standing BP: --/-- HR: --  Site: --  Mode: --  Orthostatic VS  01-18-22 @ 19:19  Lying BP: --/-- HR: --  Sitting BP: 136/80 HR: 96  Standing BP: 130/86 HR: 107  Site: upper left arm  Mode: electronic  Orthostatic VS  01-17-22 @ 19:30  Lying BP: --/-- HR: --  Sitting BP: 132/77 HR: 81  Standing BP: 132/88 HR: 99  Site: upper left arm  Mode: electronic   Vital Signs Last 24 Hrs  T(C): 37 (01-19-22 @ 20:19), Max: 37 (01-19-22 @ 20:19)  T(F): 98.6 (01-19-22 @ 20:19), Max: 98.6 (01-19-22 @ 20:19)  HR: --  BP: --  BP(mean): --  RR: --  SpO2: --    Orthostatic VS  01-19-22 @ 20:19  Lying BP: --/-- HR: --  Sitting BP: 135/84 HR: 77  Standing BP: 126/81 HR: 93  Site: --  Mode: --  Orthostatic VS  01-19-22 @ 06:56  Lying BP: --/-- HR: --  Sitting BP: 121/79 HR: 71  Standing BP: --/-- HR: --  Site: --  Mode: --  Orthostatic VS  01-19-22 @ 06:54  Lying BP: --/-- HR: --  Sitting BP: 121/79 HR: 71  Standing BP: --/-- HR: --  Site: --  Mode: --  Orthostatic VS  01-18-22 @ 19:19  Lying BP: --/-- HR: --  Sitting BP: 136/80 HR: 96  Standing BP: 130/86 HR: 107  Site: upper left arm  Mode: electronic

## 2022-01-19 NOTE — BH INPATIENT PSYCHIATRY PROGRESS NOTE - MSE UNSTRUCTURED FT
Patient is a 50yo man who appears his stated age. He is alert and oriented to person, place, and time with good grooming and hygiene. Sitting on the bed during interview and is calm and cooperative, not irritable. Eye contact is fair. Patient's speech has normal volume, tone, and rate. Patient says his mood is "anxious". Affect is depressed/anxious, mood congruent. Thought process is linear and perseverative. Thought content positive for passive SI (denies intent or plan), continued paranoid ideation about being harmed by others and somatic preoccupations. Perception: Denies auditory, tactile, or visual hallucinations. Insight and judgment are fair. Impulse control remains intact.

## 2022-01-19 NOTE — BH INPATIENT PSYCHIATRY PROGRESS NOTE - CASE SUMMARY
Pt remains depressed with passive SI, paranoid ideation about being harmed by others, and some ideas of reference, though overall appearing more hopeful (particularly in the afternoon) and paranoia is less intense. Pt remains motivated to attend rehab after discharge and then return to his family though often questioning the likelihood of following through with this plan. Pt accepting of reassurance and is able to reality test. Remains with vague somatic complaints that are likely more related to anxiety. C/w Aristada and Depakote. Will titrate Effexor to 187.5mg daily. Will start Atarax 50mg BID for anxiety.. Dispo to rehab, pending acceptance.

## 2022-01-19 NOTE — BH INPATIENT PSYCHIATRY PROGRESS NOTE - NSBHFUPINTERVALHXFT_PSY_A_CORE
No o/n events. Patient compliant with medications and in good behavioral control. Today pt. says that he is "anxious," worrying about his future and feeling hopeless that things will get better. Pt. paranoid, thinking that others are talking about him and that everyone is "out to get" him. Says that he has passive SI when he thinks about being back on the street, but otherwise does not have SI with intent or plan. Denies AVH or HI. Is most hopeful when thinking about his family. Continues to have ears ringing, finger numbness, and N/V when he is feeling most anxious.

## 2022-01-19 NOTE — BH INPATIENT PSYCHIATRY PROGRESS NOTE - CURRENT MEDICATION
MEDICATIONS  (STANDING):  diVALproex ER 1000 milliGRAM(s) Oral at bedtime  ferrous    sulfate 325 milliGRAM(s) Oral daily  venlafaxine  milliGRAM(s) Oral daily    MEDICATIONS  (PRN):  acetaminophen     Tablet .. 650 milliGRAM(s) Oral every 6 hours PRN Mild Pain (1 - 3), Moderate Pain (4 - 6)  aluminum hydroxide/magnesium hydroxide/simethicone Suspension 30 milliLiter(s) Oral every 4 hours PRN Dyspepsia  diphenhydrAMINE Injectable 50 milliGRAM(s) IntraMuscular once PRN agitation  haloperidol    Injectable 5 milliGRAM(s) IntraMuscular once PRN severe agitation  hydrOXYzine hydrochloride 50 milliGRAM(s) Oral every 6 hours PRN anxiety  loperamide 2 milliGRAM(s) Oral every 4 hours PRN diarrhea  OLANZapine 5 milliGRAM(s) Oral every 6 hours PRN agitation   MEDICATIONS  (STANDING):  diVALproex ER 1000 milliGRAM(s) Oral at bedtime  ferrous    sulfate 325 milliGRAM(s) Oral daily  hydrOXYzine hydrochloride 50 milliGRAM(s) Oral two times a day    MEDICATIONS  (PRN):  acetaminophen     Tablet .. 650 milliGRAM(s) Oral every 6 hours PRN Mild Pain (1 - 3), Moderate Pain (4 - 6)  aluminum hydroxide/magnesium hydroxide/simethicone Suspension 30 milliLiter(s) Oral every 4 hours PRN Dyspepsia  diphenhydrAMINE Injectable 50 milliGRAM(s) IntraMuscular once PRN agitation  haloperidol    Injectable 5 milliGRAM(s) IntraMuscular once PRN severe agitation  hydrOXYzine hydrochloride 50 milliGRAM(s) Oral every 6 hours PRN anxiety  loperamide 2 milliGRAM(s) Oral every 4 hours PRN diarrhea  LORazepam   Injectable 2 milliGRAM(s) IntraMuscular once PRN severe agitation  OLANZapine 5 milliGRAM(s) Oral every 6 hours PRN agitation

## 2022-01-20 LAB
ALBUMIN SERPL ELPH-MCNC: 4.3 G/DL — SIGNIFICANT CHANGE UP (ref 3.3–5)
ALP SERPL-CCNC: 67 U/L — SIGNIFICANT CHANGE UP (ref 40–120)
ALT FLD-CCNC: 22 U/L — SIGNIFICANT CHANGE UP (ref 4–41)
ANION GAP SERPL CALC-SCNC: 15 MMOL/L — HIGH (ref 7–14)
AST SERPL-CCNC: 18 U/L — SIGNIFICANT CHANGE UP (ref 4–40)
BILIRUB SERPL-MCNC: 0.4 MG/DL — SIGNIFICANT CHANGE UP (ref 0.2–1.2)
BUN SERPL-MCNC: 14 MG/DL — SIGNIFICANT CHANGE UP (ref 7–23)
CALCIUM SERPL-MCNC: 9.6 MG/DL — SIGNIFICANT CHANGE UP (ref 8.4–10.5)
CHLORIDE SERPL-SCNC: 100 MMOL/L — SIGNIFICANT CHANGE UP (ref 98–107)
CO2 SERPL-SCNC: 24 MMOL/L — SIGNIFICANT CHANGE UP (ref 22–31)
CREAT SERPL-MCNC: 0.55 MG/DL — SIGNIFICANT CHANGE UP (ref 0.5–1.3)
GLUCOSE SERPL-MCNC: 81 MG/DL — SIGNIFICANT CHANGE UP (ref 70–99)
HCT VFR BLD CALC: 48.8 % — SIGNIFICANT CHANGE UP (ref 39–50)
HGB BLD-MCNC: 14.9 G/DL — SIGNIFICANT CHANGE UP (ref 13–17)
MAGNESIUM SERPL-MCNC: 1.9 MG/DL — SIGNIFICANT CHANGE UP (ref 1.6–2.6)
MCHC RBC-ENTMCNC: 22.9 PG — LOW (ref 27–34)
MCHC RBC-ENTMCNC: 30.5 GM/DL — LOW (ref 32–36)
MCV RBC AUTO: 74.8 FL — LOW (ref 80–100)
NRBC # BLD: 0 /100 WBCS — SIGNIFICANT CHANGE UP
NRBC # FLD: 0 K/UL — SIGNIFICANT CHANGE UP
PLATELET # BLD AUTO: 252 K/UL — SIGNIFICANT CHANGE UP (ref 150–400)
POTASSIUM SERPL-MCNC: 4.3 MMOL/L — SIGNIFICANT CHANGE UP (ref 3.5–5.3)
POTASSIUM SERPL-SCNC: 4.3 MMOL/L — SIGNIFICANT CHANGE UP (ref 3.5–5.3)
PROT SERPL-MCNC: 7.2 G/DL — SIGNIFICANT CHANGE UP (ref 6–8.3)
RBC # BLD: 6.52 M/UL — HIGH (ref 4.2–5.8)
RBC # FLD: 24 % — HIGH (ref 10.3–14.5)
SODIUM SERPL-SCNC: 139 MMOL/L — SIGNIFICANT CHANGE UP (ref 135–145)
WBC # BLD: 7.84 K/UL — SIGNIFICANT CHANGE UP (ref 3.8–10.5)
WBC # FLD AUTO: 7.84 K/UL — SIGNIFICANT CHANGE UP (ref 3.8–10.5)

## 2022-01-20 RX ORDER — NICOTINE POLACRILEX 2 MG
2 GUM BUCCAL
Refills: 0 | Status: DISCONTINUED | OUTPATIENT
Start: 2022-01-20 | End: 2022-02-15

## 2022-01-20 RX ADMIN — Medication 650 MILLIGRAM(S): at 18:11

## 2022-01-20 RX ADMIN — Medication 50 MILLIGRAM(S): at 08:14

## 2022-01-20 RX ADMIN — OLANZAPINE 5 MILLIGRAM(S): 15 TABLET, FILM COATED ORAL at 11:18

## 2022-01-20 RX ADMIN — Medication 50 MILLIGRAM(S): at 11:23

## 2022-01-20 RX ADMIN — DIVALPROEX SODIUM 1000 MILLIGRAM(S): 500 TABLET, DELAYED RELEASE ORAL at 20:36

## 2022-01-20 RX ADMIN — Medication 50 MILLIGRAM(S): at 20:36

## 2022-01-20 RX ADMIN — Medication 325 MILLIGRAM(S): at 08:13

## 2022-01-20 RX ADMIN — Medication 187.5 MILLIGRAM(S): at 08:13

## 2022-01-20 NOTE — BH INPATIENT PSYCHIATRY PROGRESS NOTE - NSBHCHARTREVIEWVS_PSY_A_CORE FT
Vital Signs Last 24 Hrs  T(C): 36.4 (01-20-22 @ 08:26), Max: 37 (01-19-22 @ 20:19)  T(F): 97.5 (01-20-22 @ 08:26), Max: 98.6 (01-19-22 @ 20:19)  HR: --  BP: --  BP(mean): --  RR: --  SpO2: --    Orthostatic VS  01-20-22 @ 08:26  Lying BP: --/-- HR: --  Sitting BP: 132/87 HR: 77  Standing BP: --/-- HR: --  Site: --  Mode: --  Orthostatic VS  01-19-22 @ 20:19  Lying BP: --/-- HR: --  Sitting BP: 135/84 HR: 77  Standing BP: 126/81 HR: 93  Site: --  Mode: --  Orthostatic VS  01-19-22 @ 06:56  Lying BP: --/-- HR: --  Sitting BP: 121/79 HR: 71  Standing BP: --/-- HR: --  Site: --  Mode: --  Orthostatic VS  01-19-22 @ 06:54  Lying BP: --/-- HR: --  Sitting BP: 121/79 HR: 71  Standing BP: --/-- HR: --  Site: --  Mode: --  Orthostatic VS  01-18-22 @ 19:19  Lying BP: --/-- HR: --  Sitting BP: 136/80 HR: 96  Standing BP: 130/86 HR: 107  Site: upper left arm  Mode: electronic

## 2022-01-20 NOTE — BH INPATIENT PSYCHIATRY PROGRESS NOTE - CASE SUMMARY
Pt remains depressed and intermittently hopeless with passive SI (no intent or plan) and low self-esteem (calls himself a "bum") though accepting of reassurance and still overall with improved mood (noted particularly in the afternoon). Remains vaguely paranoid and referential (with belief that "people" may harm him and are talking negatively about him, belief that hospital will kick him out) though able to reality test. Reports ongoing anxiety and vague somatic complaints as well as derealization (both likely related to anxiety). Despite ongoing symptoms, depression and psychosis remain improved. Will c/w Effexor, Depakote, and Aristada. Pt reports Atarax helpful for his anxiety. Repeat CBC and CMP this AM within normal limits. Dispo to rehab, pending placement.  Pt remains depressed and intermittently hopeless with passive SI (no intent or plan) and low self-esteem (calls himself a "bum") though accepting of reassurance and still overall with improved mood (noted particularly in the afternoon). Remains vaguely paranoid and referential (with belief that "people" may harm him and are talking negatively about him, belief that hospital will kick him out) though able to reality test. Reports ongoing anxiety and vague somatic complaints as well as derealization (both likely related to anxiety). Despite ongoing symptoms, depression and psychosis remain improved. Will c/w Effexor, Depakote, and Aristada. Pt reports Atarax helpful for his anxiety. Repeat CBC and CMP this AM within normal limits. Pt reporting some diarrhea so will get stool PCR. Dispo to rehab, pending placement.

## 2022-01-20 NOTE — BH INPATIENT PSYCHIATRY PROGRESS NOTE - NSBHFUPINTERVALHXFT_PSY_A_CORE
Patient had episode of watery diarrhea overnight. Patient compliant with medications and in good behavioral control. Patient was feeling better yesterday afternoon, however, today pt. continues to endorse "anxious" as mood and feels less hopeful. Patient also continues to worry about his future and feeling hopeless that things will get better. Pt. is slightly less paranoid, though still thinks that others outside are talking about him. Patient agrees to go to groups in the future and was happy about going to group previously. Patient also endorses new cigarette cravings from previous habit, although he knows he doesn't want to smoke. Patient cooperative about going outside more often to get out of bed.     Patient maintains passive SI when he thinks about being back on the street, but otherwise does not have SI with intent or plan. Denies AVH or HI. Is most hopeful when thinking about his family. Today patient endorses neck pain and headache as soon as we start conversation but patient still continues to have discussions with us.

## 2022-01-20 NOTE — BH INPATIENT PSYCHIATRY PROGRESS NOTE - NSBHASSESSSUMMFT_PSY_ALL_CORE
Pt remains in good behavioral control and is cooperative and pleasant. Psychosis also remains improved though pt with ongoing persecutory and referential delusions (though without full delusional conviction, more in the realm of paranoid ideation). Pt remains depressed with passive conditional SI when thinking about relapsing though appearing more hopeful when thinking about family. Most likely 2/2 MDD with psychosis. Team continues to provide reassurance and reality testing. Neg. Romberg test regarding dizziness (1/19). Cigarette cravings starting back 1/19.     Plan:  1. Pt s/p Aristada loading on 12/30/21, next monthly Aristada 882mg due 1/27/22.   2. C/w Depakote 1000mg qhs.    3. Increase Effexor XR to 187.5 mg daily  4. Start Atarax 50mg BID for anxiety  5. Start Nicorette gum for cigarette cravings (1/20/22)  5. With regards to vague physical complaints - pt does not objectively appear unwell or unsteady. He is eating and drinking, and vitals and recent labs (including CBC, CMP, B12, folate, ammonia level) have been within normal limits. Pt's confusion and AMS that he initially presented with have also improved significantly/resolved. Pt remains on iron supplementation and Hb has been uptrending. Given that pt presented to [facility] prior to current hospitalization with AMS, r/o dx include Wernicke's encephalopathy, however he was treated with IV thiamine during hospitalization at [facility] (and oral supplementation just recently d/c). Will continue to monitor. Of note MMSE (1/10) at 27.  -will get repeat CBC and CMP tomorrow AM  6. Dispo - to rehab in [place], pending placement       Pt remains in good behavioral control and is cooperative and pleasant. Psychosis also remains improved though pt with ongoing persecutory and referential delusions (though without full delusional conviction, more in the realm of paranoid ideation). Pt remains depressed with passive conditional SI when thinking about relapsing though appearing more hopeful when thinking about family. Most likely 2/2 MDD with psychosis. Team continues to provide reassurance and reality testing. Neg. Romberg test regarding dizziness (1/19). Cigarette cravings starting back 1/19.     Plan:  1. Pt s/p Aristada loading on 12/30/21, next monthly Aristada 882mg due 1/27/22.   2. C/w Depakote 1000mg qhs.    3. C/w Effexor XR to 187.5 mg daily  4. C/w Atarax 50mg BID for anxiety  5. Start Nicorette gum for cigarette cravings (1/20/22)  5. With regards to vague physical complaints - pt does not objectively appear unwell or unsteady. He is eating and drinking, and vitals and recent labs (including CBC, CMP, B12, folate, ammonia level) have been within normal limits. Pt's confusion and AMS that he initially presented with have also improved significantly/resolved. Pt remains on iron supplementation and Hb has been uptrending. Given that pt presented to [facility] prior to current hospitalization with AMS, r/o dx include Wernicke's encephalopathy, however he was treated with IV thiamine during hospitalization at [facility] (and oral supplementation just recently d/c). Will continue to monitor. Of note MMSE (1/10) at 27. Repeat CBC and CMP this AM within normal limits.   6. Dispo - to rehab in [place], pending placement

## 2022-01-20 NOTE — BH INPATIENT PSYCHIATRY PROGRESS NOTE - MSE UNSTRUCTURED FT
Patient is a 52yo man who appears his stated age. He is alert and oriented to person, place, and time with good grooming and hygiene. Patient is cooperative and calm. Speech is normal volume and rate. Patient describes his mood as anxious and affect is congruent with mood. Patient is linear but continues to perseverate about volume and "chaos" on the floor. Patient has passive SI (without plan or intent) and only describes it in context of finding himself back on the streets. Maintains paranoid ideations about being talked about while he's inside his room.     Patient denies auditory, tactile, and visual hallucinations. Patient denies delusions and is easily reality tested regarding ideations. insight and judgment are fair. impulse control is good.

## 2022-01-20 NOTE — BH INPATIENT PSYCHIATRY PROGRESS NOTE - PRN MEDS
MEDICATIONS  (PRN):  acetaminophen     Tablet .. 650 milliGRAM(s) Oral every 6 hours PRN Mild Pain (1 - 3), Moderate Pain (4 - 6)  aluminum hydroxide/magnesium hydroxide/simethicone Suspension 30 milliLiter(s) Oral every 4 hours PRN Dyspepsia  diphenhydrAMINE Injectable 50 milliGRAM(s) IntraMuscular once PRN agitation  haloperidol    Injectable 5 milliGRAM(s) IntraMuscular once PRN severe agitation  hydrOXYzine hydrochloride 50 milliGRAM(s) Oral every 6 hours PRN anxiety  loperamide 2 milliGRAM(s) Oral every 4 hours PRN diarrhea  LORazepam   Injectable 2 milliGRAM(s) IntraMuscular once PRN severe agitation  nicotine  Polacrilex Gum 2 milliGRAM(s) Oral two times a day PRN Nicotine replacement  OLANZapine 5 milliGRAM(s) Oral every 6 hours PRN agitation   MEDICATIONS  (PRN):  acetaminophen     Tablet .. 650 milliGRAM(s) Oral every 6 hours PRN Mild Pain (1 - 3), Moderate Pain (4 - 6)  aluminum hydroxide/magnesium hydroxide/simethicone Suspension 30 milliLiter(s) Oral every 4 hours PRN Dyspepsia  diphenhydrAMINE Injectable 50 milliGRAM(s) IntraMuscular once PRN agitation  haloperidol    Injectable 5 milliGRAM(s) IntraMuscular once PRN severe agitation  loperamide 2 milliGRAM(s) Oral every 4 hours PRN diarrhea  LORazepam   Injectable 2 milliGRAM(s) IntraMuscular once PRN severe agitation  nicotine  Polacrilex Gum 2 milliGRAM(s) Oral two times a day PRN Nicotine replacement  OLANZapine 5 milliGRAM(s) Oral every 6 hours PRN agitation

## 2022-01-21 PROCEDURE — 99232 SBSQ HOSP IP/OBS MODERATE 35: CPT | Mod: 25

## 2022-01-21 PROCEDURE — 90853 GROUP PSYCHOTHERAPY: CPT

## 2022-01-21 RX ORDER — LANOLIN ALCOHOL/MO/W.PET/CERES
3 CREAM (GRAM) TOPICAL AT BEDTIME
Refills: 0 | Status: DISCONTINUED | OUTPATIENT
Start: 2022-01-21 | End: 2022-01-25

## 2022-01-21 RX ADMIN — Medication 50 MILLIGRAM(S): at 20:20

## 2022-01-21 RX ADMIN — Medication 187.5 MILLIGRAM(S): at 08:02

## 2022-01-21 RX ADMIN — Medication 50 MILLIGRAM(S): at 08:03

## 2022-01-21 RX ADMIN — DIVALPROEX SODIUM 1000 MILLIGRAM(S): 500 TABLET, DELAYED RELEASE ORAL at 20:20

## 2022-01-21 RX ADMIN — Medication 325 MILLIGRAM(S): at 08:03

## 2022-01-21 NOTE — BH INPATIENT PSYCHIATRY PROGRESS NOTE - MSE UNSTRUCTURED FT
Patient is a 52yo man who appears his stated age. He is alert and oriented to person, place, and time with good grooming and hygiene. Patient is cooperative and calm. Speech is normal volume and rate. Patient describes his mood as anxious and affect is congruent with mood. Patient is linear but continues to perseverate about volume and "chaos" on the floor. Patient has passive SI (without plan or intent) and only describes it in context of finding himself back on the streets. Maintains paranoid ideations about being talked about while he's inside his room.     Patient denies auditory, tactile, and visual hallucinations. Patient denies delusions and is easily reality tested regarding ideations. insight and judgment are fair. impulse control is good. Patient is a 52yo man who appears his stated age. He is alert and oriented to person, place, and time with fair grooming and hygiene. Patient is cooperative and calm. Speech is normal volume and rate. Patient describes his mood as "weird" and affect is blunted, depressed and is congruent with mood. Patient is linear but perserverative. Thought content includes hopelessness but denying passive/active SI (does endose conditional SI about being homeless). Maintains paranoid ideations about being talked about while he's inside his room. A&Ox3. Memory intact. Insight and judgment are fair. Behavioral control is intact.

## 2022-01-21 NOTE — BH INPATIENT PSYCHIATRY PROGRESS NOTE - CURRENT MEDICATION
MEDICATIONS  (STANDING):  diVALproex ER 1000 milliGRAM(s) Oral at bedtime  ferrous    sulfate 325 milliGRAM(s) Oral daily  hydrOXYzine hydrochloride 50 milliGRAM(s) Oral two times a day  venlafaxine .5 milliGRAM(s) Oral daily    MEDICATIONS  (PRN):  acetaminophen     Tablet .. 650 milliGRAM(s) Oral every 6 hours PRN Mild Pain (1 - 3), Moderate Pain (4 - 6)  aluminum hydroxide/magnesium hydroxide/simethicone Suspension 30 milliLiter(s) Oral every 4 hours PRN Dyspepsia  diphenhydrAMINE Injectable 50 milliGRAM(s) IntraMuscular once PRN agitation  haloperidol    Injectable 5 milliGRAM(s) IntraMuscular once PRN severe agitation  loperamide 2 milliGRAM(s) Oral every 4 hours PRN diarrhea  LORazepam   Injectable 2 milliGRAM(s) IntraMuscular once PRN severe agitation  nicotine  Polacrilex Gum 2 milliGRAM(s) Oral two times a day PRN Nicotine replacement  OLANZapine 5 milliGRAM(s) Oral every 6 hours PRN agitation   MEDICATIONS  (STANDING):  diVALproex ER 1000 milliGRAM(s) Oral at bedtime  ferrous    sulfate 325 milliGRAM(s) Oral daily  hydrOXYzine hydrochloride 50 milliGRAM(s) Oral two times a day  venlafaxine .5 milliGRAM(s) Oral daily    MEDICATIONS  (PRN):  acetaminophen     Tablet .. 650 milliGRAM(s) Oral every 6 hours PRN Mild Pain (1 - 3), Moderate Pain (4 - 6)  aluminum hydroxide/magnesium hydroxide/simethicone Suspension 30 milliLiter(s) Oral every 4 hours PRN Dyspepsia  diphenhydrAMINE Injectable 50 milliGRAM(s) IntraMuscular once PRN agitation  haloperidol    Injectable 5 milliGRAM(s) IntraMuscular once PRN severe agitation  loperamide 2 milliGRAM(s) Oral every 4 hours PRN diarrhea  LORazepam   Injectable 2 milliGRAM(s) IntraMuscular once PRN severe agitation  melatonin. 3 milliGRAM(s) Oral at bedtime PRN Insomnia  nicotine  Polacrilex Gum 2 milliGRAM(s) Oral two times a day PRN Nicotine replacement  OLANZapine 5 milliGRAM(s) Oral every 6 hours PRN agitation

## 2022-01-21 NOTE — BH INPATIENT PSYCHIATRY PROGRESS NOTE - NSBHMETABOLIC_PSY_ALL_CORE_FT
BMI:   HbA1c: A1C with Estimated Average Glucose Result: 5.4 % (12-21-21 @ 09:46)    Glucose: POCT Blood Glucose.: 95 mg/dL (11-30-21 @ 05:53)    BP: 121/70 (01-20-22 @ 18:22) (121/70 - 121/70)  Lipid Panel: Date/Time: 12-21-21 @ 09:46  Cholesterol, Serum: 148  Direct LDL: --  HDL Cholesterol, Serum: 40  Total Cholesterol/HDL Ration Measurement: --  Triglycerides, Serum: 55

## 2022-01-21 NOTE — BH INPATIENT PSYCHIATRY PROGRESS NOTE - NSBHCHARTREVIEWVS_PSY_A_CORE FT
Vital Signs Last 24 Hrs  T(C): 36.4 (01-21-22 @ 08:09), Max: 36.7 (01-20-22 @ 20:14)  T(F): 97.6 (01-21-22 @ 08:09), Max: 98.1 (01-20-22 @ 20:14)  HR: 83 (01-20-22 @ 18:22) (83 - 83)  BP: 121/70 (01-20-22 @ 18:22) (121/70 - 121/70)  BP(mean): --  RR: --  SpO2: 98% (01-20-22 @ 18:22) (98% - 98%)    Orthostatic VS  01-21-22 @ 08:09  Lying BP: --/-- HR: --  Sitting BP: 131/86 HR: 78  Standing BP: --/-- HR: --  Site: --  Mode: --  Orthostatic VS  01-20-22 @ 20:14  Lying BP: --/-- HR: --  Sitting BP: 118/70 HR: 82  Standing BP: --/-- HR: --  Site: --  Mode: --  Orthostatic VS  01-20-22 @ 08:26  Lying BP: --/-- HR: --  Sitting BP: 132/87 HR: 77  Standing BP: --/-- HR: --  Site: --  Mode: --  Orthostatic VS  01-19-22 @ 20:19  Lying BP: --/-- HR: --  Sitting BP: 135/84 HR: 77  Standing BP: 126/81 HR: 93  Site: --  Mode: --   Vital Signs Last 24 Hrs  T(C): 36.4 (01-21-22 @ 08:09), Max: 36.7 (01-20-22 @ 20:14)  T(F): 97.6 (01-21-22 @ 08:09), Max: 98.1 (01-20-22 @ 20:14)  HR: --  BP: --  BP(mean): --  RR: --  SpO2: --    Orthostatic VS  01-21-22 @ 08:09  Lying BP: --/-- HR: --  Sitting BP: 131/86 HR: 78  Standing BP: --/-- HR: --  Site: --  Mode: --  Orthostatic VS  01-20-22 @ 20:14  Lying BP: --/-- HR: --  Sitting BP: 118/70 HR: 82  Standing BP: --/-- HR: --  Site: --  Mode: --  Orthostatic VS  01-20-22 @ 08:26  Lying BP: --/-- HR: --  Sitting BP: 132/87 HR: 77  Standing BP: --/-- HR: --  Site: --  Mode: --  Orthostatic VS  01-19-22 @ 20:19  Lying BP: --/-- HR: --  Sitting BP: 135/84 HR: 77  Standing BP: 126/81 HR: 93  Site: --  Mode: --

## 2022-01-21 NOTE — BH INPATIENT PSYCHIATRY PROGRESS NOTE - CASE SUMMARY
Pt remains depressed with intermittent passive SI (no intent or plan) though appearing more hopeful and noted to leave his room a bit more and attended a group today. Remains vaguely paranoid as well though this is less intense. Remains anxious about the future but accepts reassurance and reality testing. Remains in good behavioral control, compliant with meds, and sleeping well. Will c/w Aristada, Effexor, Depakote, and Atarax. Dispo to rehab, pending acceptance.

## 2022-01-21 NOTE — BH INPATIENT PSYCHIATRY PROGRESS NOTE - PRN MEDS
MEDICATIONS  (PRN):  acetaminophen     Tablet .. 650 milliGRAM(s) Oral every 6 hours PRN Mild Pain (1 - 3), Moderate Pain (4 - 6)  aluminum hydroxide/magnesium hydroxide/simethicone Suspension 30 milliLiter(s) Oral every 4 hours PRN Dyspepsia  diphenhydrAMINE Injectable 50 milliGRAM(s) IntraMuscular once PRN agitation  haloperidol    Injectable 5 milliGRAM(s) IntraMuscular once PRN severe agitation  loperamide 2 milliGRAM(s) Oral every 4 hours PRN diarrhea  LORazepam   Injectable 2 milliGRAM(s) IntraMuscular once PRN severe agitation  nicotine  Polacrilex Gum 2 milliGRAM(s) Oral two times a day PRN Nicotine replacement  OLANZapine 5 milliGRAM(s) Oral every 6 hours PRN agitation   MEDICATIONS  (PRN):  acetaminophen     Tablet .. 650 milliGRAM(s) Oral every 6 hours PRN Mild Pain (1 - 3), Moderate Pain (4 - 6)  aluminum hydroxide/magnesium hydroxide/simethicone Suspension 30 milliLiter(s) Oral every 4 hours PRN Dyspepsia  diphenhydrAMINE Injectable 50 milliGRAM(s) IntraMuscular once PRN agitation  haloperidol    Injectable 5 milliGRAM(s) IntraMuscular once PRN severe agitation  loperamide 2 milliGRAM(s) Oral every 4 hours PRN diarrhea  LORazepam   Injectable 2 milliGRAM(s) IntraMuscular once PRN severe agitation  melatonin. 3 milliGRAM(s) Oral at bedtime PRN Insomnia  nicotine  Polacrilex Gum 2 milliGRAM(s) Oral two times a day PRN Nicotine replacement  OLANZapine 5 milliGRAM(s) Oral every 6 hours PRN agitation

## 2022-01-21 NOTE — BH INPATIENT PSYCHIATRY PROGRESS NOTE - NSBHFUPINTERVALHXFT_PSY_A_CORE
Patient had episode of watery diarrhea overnight. Patient compliant with medications and in good behavioral control. Patient was feeling better yesterday afternoon, however, today pt. continues to endorse "anxious" as mood and feels less hopeful. Patient also continues to worry about his future and feeling hopeless that things will get better. Pt. is slightly less paranoid, though still thinks that others outside are talking about him. Patient agrees to go to groups in the future and was happy about going to group previously. Patient also endorses new cigarette cravings from previous habit, although he knows he doesn't want to smoke. Patient cooperative about going outside more often to get out of bed.     Patient maintains passive SI when he thinks about being back on the street, but otherwise does not have SI with intent or plan. Denies AVH or HI. Is most hopeful when thinking about his family. Today patient endorses neck pain and headache as soon as we start conversation but patient still continues to have discussions with us.  No acute overnight events. Patient compliant with medications and in good behavioral control. Pt says that today he feels "weird." He continues to feel like people are talking about him even though he knows they are not. Says that he sometimes feels hopeless about his future although he wants to go to rehab then home and has no intention to drink alcohol again. Went to one group yesterday and enjoyed it. Appetite is good but says sometimes it's difficult for him to fall asleep. Denies any side effects to his medications. Denies current passive SI or active SI but has conditional SI at the thought of being homeless again. Denies AVH and HI. Patient had two to three episodes of watery diarrhea yesterday, saying that he has abdominal pain. Says that sometimes he feels dizzy and agreed to drink more water.

## 2022-01-21 NOTE — BH INPATIENT PSYCHIATRY PROGRESS NOTE - NSBHASSESSSUMMFT_PSY_ALL_CORE
Pt remains in good behavioral control and is cooperative and pleasant. Psychosis also remains improved though pt with ongoing persecutory and referential delusions (though without full delusional conviction, more in the realm of paranoid ideation). Pt remains depressed with passive conditional SI when thinking about relapsing though appearing more hopeful when thinking about family. Most likely 2/2 MDD with psychosis. Team continues to provide reassurance and reality testing. Neg. Romberg test regarding dizziness (1/19). Cigarette cravings starting back 1/19.     Plan:  1. Pt s/p Aristada loading on 12/30/21, next monthly Aristada 882mg due 1/27/22.   2. C/w Depakote 1000mg qhs.    3. C/w Effexor XR to 187.5 mg daily  4. C/w Atarax 50mg BID for anxiety  5. Start Nicorette gum for cigarette cravings (1/20/22)  5. With regards to vague physical complaints - pt does not objectively appear unwell or unsteady. He is eating and drinking, and vitals and recent labs (including CBC, CMP, B12, folate, ammonia level) have been within normal limits. Pt's confusion and AMS that he initially presented with have also improved significantly/resolved. Pt remains on iron supplementation and Hb has been uptrending. Given that pt presented to [place] prior to current hospitalization with AMS, r/o dx include Wernicke's encephalopathy, however he was treated with IV thiamine during hospitalization at [place] (and oral supplementation just recently d/c). Will continue to monitor. Of note MMSE (1/10) at 27. Repeat CBC and CMP this AM within normal limits.   6. Dispo - to rehab in [place], pending placement       Pt remains in good behavioral control and is cooperative and pleasant. Psychosis also remains improved though pt with ongoing persecutory and referential delusions (though without full delusional conviction, more in the realm of paranoid ideation). Pt remains depressed with passive conditional SI when thinking about relapsing though appearing more hopeful when thinking about family. Most likely 2/2 MDD with psychosis. Team continues to provide reassurance and reality testing. Neg. Romberg test regarding dizziness (1/19). Cigarette cravings starting back 1/19.     Plan:  1. Pt s/p Aristada loading on 12/30/21, next monthly Aristada 882mg due 1/27/22.   2. C/w Depakote 1000mg qhs.    3. C/w Effexor XR to 187.5 mg daily  4. C/w Atarax 50mg BID for anxiety  5. Nicorette gum for cigarette cravings (1/20/22)  6. Melatonin 3mg QHs PRN for insomnia  7. With regards to vague physical complaints - pt does not objectively appear unwell or unsteady. He is eating and drinking, and vitals and recent labs (including CBC, CMP, B12, folate, ammonia level) have been within normal limits. Pt's confusion and AMS that he initially presented with have also improved significantly/resolved. Pt remains on iron supplementation and Hb has been uptrending. Given that pt presented to [place] prior to current hospitalization with AMS, r/o dx include Wernicke's encephalopathy, however he was treated with IV thiamine during hospitalization at [place] (and oral supplementation just recently d/c). Will continue to monitor. Of note MMSE (1/10) at 27. Repeat CBC and CMP this AM within normal limits.   8. Dispo - to rehab in [place], pending placement

## 2022-01-22 RX ADMIN — Medication 50 MILLIGRAM(S): at 08:11

## 2022-01-22 RX ADMIN — Medication 50 MILLIGRAM(S): at 20:12

## 2022-01-22 RX ADMIN — Medication 187.5 MILLIGRAM(S): at 08:11

## 2022-01-22 RX ADMIN — DIVALPROEX SODIUM 1000 MILLIGRAM(S): 500 TABLET, DELAYED RELEASE ORAL at 20:12

## 2022-01-22 RX ADMIN — Medication 325 MILLIGRAM(S): at 08:11

## 2022-01-23 RX ADMIN — Medication 187.5 MILLIGRAM(S): at 08:10

## 2022-01-23 RX ADMIN — Medication 50 MILLIGRAM(S): at 20:22

## 2022-01-23 RX ADMIN — Medication 325 MILLIGRAM(S): at 08:11

## 2022-01-23 RX ADMIN — DIVALPROEX SODIUM 1000 MILLIGRAM(S): 500 TABLET, DELAYED RELEASE ORAL at 20:22

## 2022-01-23 RX ADMIN — Medication 2 MILLIGRAM(S): at 08:10

## 2022-01-23 RX ADMIN — Medication 50 MILLIGRAM(S): at 08:10

## 2022-01-24 PROCEDURE — 99232 SBSQ HOSP IP/OBS MODERATE 35: CPT

## 2022-01-24 RX ORDER — DIVALPROEX SODIUM 500 MG/1
500 TABLET, DELAYED RELEASE ORAL AT BEDTIME
Refills: 0 | Status: DISCONTINUED | OUTPATIENT
Start: 2022-01-24 | End: 2022-01-26

## 2022-01-24 RX ORDER — VENLAFAXINE HCL 75 MG
225 CAPSULE, EXT RELEASE 24 HR ORAL DAILY
Refills: 0 | Status: DISCONTINUED | OUTPATIENT
Start: 2022-01-25 | End: 2022-02-15

## 2022-01-24 RX ADMIN — Medication 187.5 MILLIGRAM(S): at 08:06

## 2022-01-24 RX ADMIN — Medication 50 MILLIGRAM(S): at 08:06

## 2022-01-24 RX ADMIN — DIVALPROEX SODIUM 500 MILLIGRAM(S): 500 TABLET, DELAYED RELEASE ORAL at 21:04

## 2022-01-24 RX ADMIN — Medication 325 MILLIGRAM(S): at 08:06

## 2022-01-24 RX ADMIN — Medication 50 MILLIGRAM(S): at 21:04

## 2022-01-24 NOTE — BH INPATIENT PSYCHIATRY PROGRESS NOTE - NSBHASSESSSUMMFT_PSY_ALL_CORE
Pt remains in good behavioral control and is cooperative and pleasant. Psychosis also remains improved though pt with ongoing persecutory and referential delusions (though without full delusional conviction, more in the realm of paranoid ideation). Pt remains depressed with passive conditional SI when thinking about relapsing though appearing more hopeful when thinking about family. Most likely 2/2 MDD with psychosis. Team continues to provide reassurance and reality testing. Neg. Romberg test regarding dizziness (1/19). Cigarette cravings starting back 1/19.     Plan:  1. Pt s/p Aristada loading on 12/30/21, next monthly Aristada 882mg due 1/27/22.   2. C/w Depakote 1000mg qhs.    3. C/w Effexor XR to 187.5 mg daily  4. C/w Atarax 50mg BID for anxiety  5. Nicorette gum for cigarette cravings (1/20/22)  6. Melatonin 3mg QHs PRN for insomnia  7. With regards to vague physical complaints - pt does not objectively appear unwell or unsteady. He is eating and drinking, and vitals and recent labs (including CBC, CMP, B12, folate, ammonia level) have been within normal limits. Pt's confusion and AMS that he initially presented with have also improved significantly/resolved. Pt remains on iron supplementation and Hb has been uptrending. Given that pt presented to [place] prior to current hospitalization with AMS, r/o dx include Wernicke's encephalopathy, however he was treated with IV thiamine during hospitalization at [place] (and oral supplementation just recently d/c). Will continue to monitor. Of note MMSE (1/10) at 27. Repeat CBC and CMP this AM within normal limits.   8. Dispo - to rehab in [place], pending placement       Pt remains in good behavioral control and is cooperative and pleasant. Psychosis also remains improved though pt with ongoing persecutory and referential delusions (though without full delusional conviction, more in the realm of paranoid ideation). Pt remains depressed with passive conditional SI when thinking about relapsing though appearing more hopeful when thinking about family. Most likely 2/2 MDD with psychosis. Team continues to provide reassurance and reality testing. Neg. Romberg test regarding dizziness (1/19). Cigarette cravings starting back 1/19. Overall depression has improved since admission but has improvement has plateaued, with continual hopelessness about future.    Plan:  1. Pt s/p Aristada loading on 12/30/21, next monthly Aristada 882mg due 1/27/22.   2. Decrease Depakote 500mg qhs with plans to d/c    3. Increase Effexor XR to 225 mg daily  4. C/w Atarax 50mg BID for anxiety  5. Nicorette gum for cigarette cravings (1/20/22)  6. Melatonin 3mg QHs PRN for insomnia  7. Considering ECT  8. With regards to vague physical complaints - pt does not objectively appear unwell or unsteady. He is eating and drinking, and vitals and recent labs (including CBC, CMP, B12, folate, ammonia level) have been within normal limits. Pt's confusion and AMS that he initially presented with have also improved significantly/resolved. Pt remains on iron supplementation and Hb has been uptrending. Given that pt presented to [place] prior to current hospitalization with AMS, r/o dx include Wernicke's encephalopathy, however he was treated with IV thiamine during hospitalization at [place] (and oral supplementation just recently d/c). Will continue to monitor. Of note MMSE (1/10) at 27. Repeat CBC and CMP this AM within normal limits.   9. Dispo - to rehab in [place], pending placement

## 2022-01-24 NOTE — BH INPATIENT PSYCHIATRY PROGRESS NOTE - MSE UNSTRUCTURED FT
Patient is a 50yo man who appears his stated age. He is alert and oriented to person, place, and time with fair grooming and hygiene. Patient is cooperative and calm. Speech is normal volume and rate. Patient describes his mood as "weird" and affect is blunted, depressed and is congruent with mood. Patient is linear but perserverative. Thought content includes hopelessness but denying passive/active SI (does endose conditional SI about being homeless). Maintains paranoid ideations about being talked about while he's inside his room. A&Ox3. Memory intact. Insight and judgment are fair. Behavioral control is intact.  Patient is a 50yo man who appears his stated age. He is alert and oriented to person, place, and time with fair grooming and hygiene. Patient is cooperative and calm. Speech is normal volume and rate. Patient describes his mood as "pretty good" and affect is blunted, depressed and is congruent with mood. Patient is linear but perseverative. Thought content includes hopelessness but denying passive/active SI (does endorse conditional SI about being homeless). Maintains paranoid ideations about being talked about while he's inside his room. A&Ox3. Memory intact. Insight and judgment are fair. Behavioral control is intact.

## 2022-01-24 NOTE — BH INPATIENT PSYCHIATRY PROGRESS NOTE - NSBHFUPINTERVALHXFT_PSY_A_CORE
No acute overnight events. Patient compliant with medications and in good behavioral control. Pt says that today he feels "pretty good." Says that his diarrhea and abdominal pain has greatly improved, and his only issue is intermittent tingling in his hands. Has gone to groups and enjoyed them. He continues to feel like people are talking about him even though he knows they are not. Says that he sometimes feels hopeless about his future although he wants to go to rehab then home and has no intention to drink alcohol again. Went to one group yesterday and enjoyed it. Appetite is good but says sometimes it's difficult for him to fall asleep. Denies any side effects to his medications. Denies current passive SI or active SI but has conditional SI at the thought of being homeless again. Denies AVH and HI.  No acute overnight events. Patient compliant with medications and in good behavioral control. Pt says that today he feels "pretty good." Says that his diarrhea and abdominal pain has greatly improved, and his only issue is intermittent tingling in his hands. Has gone to groups and enjoyed them. He continues to feel like people are talking about him even though he knows they are not. Says that he sometimes feels hopeless about his future although he wants to go to rehab then home and has no intention to drink alcohol again. Participated in a group yesterday where he socialized with other members on the floor, enjoyed it. Appetite is good. Says sleeps well but has nightmares. Denies any side effects to his medications. Denies current passive SI or active SI but has conditional SI at the thought of being homeless again. Denies AVH and HI.

## 2022-01-24 NOTE — BH INPATIENT PSYCHIATRY PROGRESS NOTE - CASE SUMMARY
Though pt has exhibited some slight improvements in mood recently (at times more hopeful, somewhat more interactive in the unit milieu) he overall remains largely depressed, hopeless, with ongoing passive SI (no intent or plan), guilt that borders on pathological in nature, and with self-loathing thoughts. Paranoia about being harmed remains and pt very referential. C/w monthly Aristada. Titrate Effexor to 225mg daily. Pt likely to benefit from ECT for psychotic depression, will continue to investigate issues with insurance.

## 2022-01-24 NOTE — BH INPATIENT PSYCHIATRY PROGRESS NOTE - NSBHMETABOLIC_PSY_ALL_CORE_FT
BMI:   HbA1c: A1C with Estimated Average Glucose Result: 5.4 % (12-21-21 @ 09:46)    Glucose: POCT Blood Glucose.: 95 mg/dL (11-30-21 @ 05:53)    BP: 127/76 (01-24-22 @ 08:17) (127/76 - 136/92)  Lipid Panel: Date/Time: 12-21-21 @ 09:46  Cholesterol, Serum: 148  Direct LDL: --  HDL Cholesterol, Serum: 40  Total Cholesterol/HDL Ration Measurement: --  Triglycerides, Serum: 55

## 2022-01-24 NOTE — BH INPATIENT PSYCHIATRY PROGRESS NOTE - CURRENT MEDICATION
MEDICATIONS  (STANDING):  diVALproex ER 1000 milliGRAM(s) Oral at bedtime  ferrous    sulfate 325 milliGRAM(s) Oral daily  hydrOXYzine hydrochloride 50 milliGRAM(s) Oral two times a day  venlafaxine .5 milliGRAM(s) Oral daily    MEDICATIONS  (PRN):  acetaminophen     Tablet .. 650 milliGRAM(s) Oral every 6 hours PRN Mild Pain (1 - 3), Moderate Pain (4 - 6)  aluminum hydroxide/magnesium hydroxide/simethicone Suspension 30 milliLiter(s) Oral every 4 hours PRN Dyspepsia  diphenhydrAMINE Injectable 50 milliGRAM(s) IntraMuscular once PRN agitation  haloperidol    Injectable 5 milliGRAM(s) IntraMuscular once PRN severe agitation  loperamide 2 milliGRAM(s) Oral every 4 hours PRN diarrhea  LORazepam   Injectable 2 milliGRAM(s) IntraMuscular once PRN severe agitation  melatonin. 3 milliGRAM(s) Oral at bedtime PRN Insomnia  nicotine  Polacrilex Gum 2 milliGRAM(s) Oral two times a day PRN Nicotine replacement  OLANZapine 5 milliGRAM(s) Oral every 6 hours PRN agitation   MEDICATIONS  (STANDING):  diVALproex  milliGRAM(s) Oral at bedtime  ferrous    sulfate 325 milliGRAM(s) Oral daily  hydrOXYzine hydrochloride 50 milliGRAM(s) Oral two times a day    MEDICATIONS  (PRN):  acetaminophen     Tablet .. 650 milliGRAM(s) Oral every 6 hours PRN Mild Pain (1 - 3), Moderate Pain (4 - 6)  aluminum hydroxide/magnesium hydroxide/simethicone Suspension 30 milliLiter(s) Oral every 4 hours PRN Dyspepsia  diphenhydrAMINE Injectable 50 milliGRAM(s) IntraMuscular once PRN agitation  haloperidol    Injectable 5 milliGRAM(s) IntraMuscular once PRN severe agitation  loperamide 2 milliGRAM(s) Oral every 4 hours PRN diarrhea  LORazepam   Injectable 2 milliGRAM(s) IntraMuscular once PRN severe agitation  melatonin. 3 milliGRAM(s) Oral at bedtime PRN Insomnia  nicotine  Polacrilex Gum 2 milliGRAM(s) Oral two times a day PRN Nicotine replacement  OLANZapine 5 milliGRAM(s) Oral every 6 hours PRN agitation

## 2022-01-24 NOTE — BH INPATIENT PSYCHIATRY PROGRESS NOTE - NSBHCHARTREVIEWVS_PSY_A_CORE FT
Vital Signs Last 24 Hrs  T(C): 36.4 (01-24-22 @ 08:17), Max: 37.1 (01-23-22 @ 19:13)  T(F): 97.6 (01-24-22 @ 08:17), Max: 98.8 (01-23-22 @ 19:13)  HR: 83 (01-24-22 @ 08:17) (83 - 83)  BP: 127/76 (01-24-22 @ 08:17) (127/76 - 127/76)  BP(mean): --  RR: --  SpO2: --    Orthostatic VS  01-23-22 @ 19:13  Lying BP: --/-- HR: --  Sitting BP: 140/77 HR: 89  Standing BP: 125/80 HR: 101  Site: --  Mode: --  Orthostatic VS  01-23-22 @ 07:52  Lying BP: --/-- HR: --  Sitting BP: 124/88 HR: 79  Standing BP: --/-- HR: --  Site: --  Mode: --

## 2022-01-24 NOTE — BH INPATIENT PSYCHIATRY PROGRESS NOTE - PRN MEDS
MEDICATIONS  (PRN):  acetaminophen     Tablet .. 650 milliGRAM(s) Oral every 6 hours PRN Mild Pain (1 - 3), Moderate Pain (4 - 6)  aluminum hydroxide/magnesium hydroxide/simethicone Suspension 30 milliLiter(s) Oral every 4 hours PRN Dyspepsia  diphenhydrAMINE Injectable 50 milliGRAM(s) IntraMuscular once PRN agitation  haloperidol    Injectable 5 milliGRAM(s) IntraMuscular once PRN severe agitation  loperamide 2 milliGRAM(s) Oral every 4 hours PRN diarrhea  LORazepam   Injectable 2 milliGRAM(s) IntraMuscular once PRN severe agitation  melatonin. 3 milliGRAM(s) Oral at bedtime PRN Insomnia  nicotine  Polacrilex Gum 2 milliGRAM(s) Oral two times a day PRN Nicotine replacement  OLANZapine 5 milliGRAM(s) Oral every 6 hours PRN agitation

## 2022-01-25 PROCEDURE — 99232 SBSQ HOSP IP/OBS MODERATE 35: CPT

## 2022-01-25 RX ORDER — LANOLIN ALCOHOL/MO/W.PET/CERES
3 CREAM (GRAM) TOPICAL AT BEDTIME
Refills: 0 | Status: DISCONTINUED | OUTPATIENT
Start: 2022-01-25 | End: 2022-02-15

## 2022-01-25 RX ADMIN — Medication 325 MILLIGRAM(S): at 08:11

## 2022-01-25 RX ADMIN — Medication 50 MILLIGRAM(S): at 20:15

## 2022-01-25 RX ADMIN — Medication 225 MILLIGRAM(S): at 08:11

## 2022-01-25 RX ADMIN — Medication 3 MILLIGRAM(S): at 20:15

## 2022-01-25 RX ADMIN — Medication 650 MILLIGRAM(S): at 12:49

## 2022-01-25 RX ADMIN — Medication 50 MILLIGRAM(S): at 08:11

## 2022-01-25 RX ADMIN — Medication 650 MILLIGRAM(S): at 11:49

## 2022-01-25 RX ADMIN — DIVALPROEX SODIUM 500 MILLIGRAM(S): 500 TABLET, DELAYED RELEASE ORAL at 20:14

## 2022-01-25 NOTE — BH INPATIENT PSYCHIATRY PROGRESS NOTE - CURRENT MEDICATION
MEDICATIONS  (STANDING):  diVALproex  milliGRAM(s) Oral at bedtime  ferrous    sulfate 325 milliGRAM(s) Oral daily  hydrOXYzine hydrochloride 50 milliGRAM(s) Oral two times a day  melatonin. 3 milliGRAM(s) Oral at bedtime  venlafaxine  milliGRAM(s) Oral daily    MEDICATIONS  (PRN):  acetaminophen     Tablet .. 650 milliGRAM(s) Oral every 6 hours PRN Mild Pain (1 - 3), Moderate Pain (4 - 6)  aluminum hydroxide/magnesium hydroxide/simethicone Suspension 30 milliLiter(s) Oral every 4 hours PRN Dyspepsia  diphenhydrAMINE Injectable 50 milliGRAM(s) IntraMuscular once PRN agitation  haloperidol    Injectable 5 milliGRAM(s) IntraMuscular once PRN severe agitation  loperamide 2 milliGRAM(s) Oral every 4 hours PRN diarrhea  LORazepam   Injectable 2 milliGRAM(s) IntraMuscular once PRN severe agitation  nicotine  Polacrilex Gum 2 milliGRAM(s) Oral two times a day PRN Nicotine replacement  OLANZapine 5 milliGRAM(s) Oral every 6 hours PRN agitation   MEDICATIONS  (STANDING):  ferrous    sulfate 325 milliGRAM(s) Oral daily  hydrOXYzine hydrochloride 50 milliGRAM(s) Oral two times a day  melatonin. 3 milliGRAM(s) Oral at bedtime  venlafaxine  milliGRAM(s) Oral daily    MEDICATIONS  (PRN):  acetaminophen     Tablet .. 650 milliGRAM(s) Oral every 6 hours PRN Mild Pain (1 - 3), Moderate Pain (4 - 6)  aluminum hydroxide/magnesium hydroxide/simethicone Suspension 30 milliLiter(s) Oral every 4 hours PRN Dyspepsia  diphenhydrAMINE Injectable 50 milliGRAM(s) IntraMuscular once PRN agitation  haloperidol    Injectable 5 milliGRAM(s) IntraMuscular once PRN severe agitation  loperamide 2 milliGRAM(s) Oral every 4 hours PRN diarrhea  LORazepam   Injectable 2 milliGRAM(s) IntraMuscular once PRN severe agitation  nicotine  Polacrilex Gum 2 milliGRAM(s) Oral two times a day PRN Nicotine replacement  OLANZapine 5 milliGRAM(s) Oral every 6 hours PRN agitation  traZODone 50 milliGRAM(s) Oral at bedtime PRN Insomnia

## 2022-01-25 NOTE — BH INPATIENT PSYCHIATRY PROGRESS NOTE - NSBHCHARTREVIEWVS_PSY_A_CORE FT
Vital Signs Last 24 Hrs  T(C): 36.3 (01-25-22 @ 08:14), Max: 36.6 (01-24-22 @ 18:46)  T(F): 97.3 (01-25-22 @ 08:14), Max: 97.9 (01-24-22 @ 18:46)  HR: --  BP: --  BP(mean): --  RR: --  SpO2: --    Orthostatic VS  01-25-22 @ 08:14  Lying BP: --/-- HR: --  Sitting BP: 157/84 HR: 78  Standing BP: --/-- HR: --  Site: --  Mode: --  Orthostatic VS  01-24-22 @ 18:46  Lying BP: --/-- HR: --  Sitting BP: 134/79 HR: 85  Standing BP: --/-- HR: --  Site: --  Mode: --  Orthostatic VS  01-23-22 @ 19:13  Lying BP: --/-- HR: --  Sitting BP: 140/77 HR: 89  Standing BP: 125/80 HR: 101  Site: --  Mode: --   Vital Signs Last 24 Hrs  T(C): 36.4 (01-26-22 @ 08:19), Max: 36.9 (01-25-22 @ 19:20)  T(F): 97.5 (01-26-22 @ 08:19), Max: 98.5 (01-25-22 @ 19:20)  HR: 116 (01-26-22 @ 08:19) (116 - 116)  BP: 126/91 (01-26-22 @ 08:19) (126/91 - 126/91)  BP(mean): --  RR: --  SpO2: --    Orthostatic VS  01-26-22 @ 11:45  Lying BP: --/-- HR: --  Sitting BP: 139/78 HR: 98  Standing BP: 129/78 HR: 95  Site: --  Mode: --  Orthostatic VS  01-25-22 @ 19:20  Lying BP: --/-- HR: --  Sitting BP: 151/84 HR: 86  Standing BP: --/-- HR: --  Site: upper left arm  Mode: electronic  Orthostatic VS  01-25-22 @ 08:14  Lying BP: --/-- HR: --  Sitting BP: 157/84 HR: 78  Standing BP: --/-- HR: --  Site: --  Mode: --  Orthostatic VS  01-24-22 @ 18:46  Lying BP: --/-- HR: --  Sitting BP: 134/79 HR: 85  Standing BP: --/-- HR: --  Site: --  Mode: --

## 2022-01-25 NOTE — BH INPATIENT PSYCHIATRY PROGRESS NOTE - CASE SUMMARY
Pt with some improvements in depression and psychosis though remains with low mood, passive SI, hopelessness and with ongoing paranoia and referentiality.  Pt with some improvements in depression and psychosis though remains with low mood, passive SI, hopelessness and with ongoing paranoia and ideas of reference, also guilt that borders on pathological/delusional in nature. Despite this pt noted to be a bit more interactive in unit milieu today (sitting in the day room, out of his room more). Monthly Aristada 882mg due 1/27. Will titrate Effexor to 225mg daily. C/w Atarax 50mg BID for anxiety. Taper Depakote to 500mg with goal to d/c given no clear need/indication.

## 2022-01-25 NOTE — BH INPATIENT PSYCHIATRY PROGRESS NOTE - NSBHFUPINTERVALHXFT_PSY_A_CORE
No acute overnight events. Patient compliant with medications and in good behavioral control. Pt says that today he feels "down." Says that he feels like a burden on the world and his family and that he will "die alone." Says that overall he continues to feel hopeless. His appetite was good last night. Says that he "didn't sleep at all" last night and that he is having trouble concentrating. Says that he has been on the unit more often in the last day which makes him feel better. Denies passive death wish or SI with intent or plan but does endorse conditional SI when thinking about being homeless again. Denies AVH or HI but continues to have paranoia that others are saying bad things about him. Asking for ECT.

## 2022-01-25 NOTE — BH INPATIENT PSYCHIATRY PROGRESS NOTE - NSBHASSESSSUMMFT_PSY_ALL_CORE
Pt remains in good behavioral control and is cooperative and pleasant. Psychosis also remains improved though pt with ongoing persecutory and referential delusions (though without full delusional conviction, more in the realm of paranoid ideation). Pt remains depressed with passive conditional SI when thinking about relapsing though appearing more hopeful when thinking about family. Most likely 2/2 MDD with psychosis. Team continues to provide reassurance and reality testing. Neg. Romberg test regarding dizziness (1/19). Cigarette cravings starting back 1/19. Overall depression has improved since admission but has improvement has plateaued, with continual hopelessness about future and constant guilt/feeling like he is a burden.    Plan:  1. Pt s/p Aristada loading on 12/30/21, next monthly Aristada 882mg due 1/27/22.   2. Continue Depakote 500mg qhs with plans to d/c    3. Continue Effexor XR to 225 mg daily  4. C/w Atarax 50mg BID for anxiety  5. Nicorette gum for cigarette cravings (1/20/22)  6. Melatonin 3mg QHs for insomnia  7. Considering ECT  8. With regards to vague physical complaints - pt does not objectively appear unwell or unsteady. He is eating and drinking, and vitals and recent labs (including CBC, CMP, B12, folate, ammonia level) have been within normal limits. Pt's confusion and AMS that he initially presented with have also improved significantly/resolved. Pt remains on iron supplementation and Hb has been uptrending. Given that pt presented to [place] prior to current hospitalization with AMS, r/o dx include Wernicke's encephalopathy, however he was treated with IV thiamine during hospitalization at [place] (and oral supplementation just recently d/c). Will continue to monitor. Of note MMSE (1/10) at 27. Repeat CBC and CMP this AM within normal limits.   9. Dispo - to rehab in [place], pending placement

## 2022-01-25 NOTE — BH INPATIENT PSYCHIATRY PROGRESS NOTE - NSBHMETABOLIC_PSY_ALL_CORE_FT
BMI:   HbA1c: A1C with Estimated Average Glucose Result: 5.4 % (12-21-21 @ 09:46)    Glucose: POCT Blood Glucose.: 95 mg/dL (11-30-21 @ 05:53)    BP: 127/76 (01-24-22 @ 08:17) (127/76 - 127/76)  Lipid Panel: Date/Time: 12-21-21 @ 09:46  Cholesterol, Serum: 148  Direct LDL: --  HDL Cholesterol, Serum: 40  Total Cholesterol/HDL Ration Measurement: --  Triglycerides, Serum: 55   BMI:   HbA1c: A1C with Estimated Average Glucose Result: 5.4 % (12-21-21 @ 09:46)    Glucose: POCT Blood Glucose.: 95 mg/dL (11-30-21 @ 05:53)    BP: 126/91 (01-26-22 @ 08:19) (126/91 - 127/76)  Lipid Panel: Date/Time: 12-21-21 @ 09:46  Cholesterol, Serum: 148  Direct LDL: --  HDL Cholesterol, Serum: 40  Total Cholesterol/HDL Ration Measurement: --  Triglycerides, Serum: 55

## 2022-01-25 NOTE — BH INPATIENT PSYCHIATRY PROGRESS NOTE - PRN MEDS
MEDICATIONS  (PRN):  acetaminophen     Tablet .. 650 milliGRAM(s) Oral every 6 hours PRN Mild Pain (1 - 3), Moderate Pain (4 - 6)  aluminum hydroxide/magnesium hydroxide/simethicone Suspension 30 milliLiter(s) Oral every 4 hours PRN Dyspepsia  diphenhydrAMINE Injectable 50 milliGRAM(s) IntraMuscular once PRN agitation  haloperidol    Injectable 5 milliGRAM(s) IntraMuscular once PRN severe agitation  loperamide 2 milliGRAM(s) Oral every 4 hours PRN diarrhea  LORazepam   Injectable 2 milliGRAM(s) IntraMuscular once PRN severe agitation  nicotine  Polacrilex Gum 2 milliGRAM(s) Oral two times a day PRN Nicotine replacement  OLANZapine 5 milliGRAM(s) Oral every 6 hours PRN agitation   MEDICATIONS  (PRN):  acetaminophen     Tablet .. 650 milliGRAM(s) Oral every 6 hours PRN Mild Pain (1 - 3), Moderate Pain (4 - 6)  aluminum hydroxide/magnesium hydroxide/simethicone Suspension 30 milliLiter(s) Oral every 4 hours PRN Dyspepsia  diphenhydrAMINE Injectable 50 milliGRAM(s) IntraMuscular once PRN agitation  haloperidol    Injectable 5 milliGRAM(s) IntraMuscular once PRN severe agitation  loperamide 2 milliGRAM(s) Oral every 4 hours PRN diarrhea  LORazepam   Injectable 2 milliGRAM(s) IntraMuscular once PRN severe agitation  nicotine  Polacrilex Gum 2 milliGRAM(s) Oral two times a day PRN Nicotine replacement  OLANZapine 5 milliGRAM(s) Oral every 6 hours PRN agitation  traZODone 50 milliGRAM(s) Oral at bedtime PRN Insomnia

## 2022-01-25 NOTE — BH INPATIENT PSYCHIATRY PROGRESS NOTE - MSE UNSTRUCTURED FT
Patient is a 50yo man who appears his stated age. He is alert and oriented to person, place, and time with fair grooming and hygiene. Patient is cooperative and calm. Speech is normal volume and rate. Patient describes his mood as "down" and affect is blunted, depressed and is congruent with mood. Patient is linear but perseverative. Thought content includes hopelessness but denying passive/active SI (does endorse conditional SI about being homeless). Maintains paranoid ideations about being talked about while he's inside his room. A&Ox3. Memory intact. Insight and judgment are fair. Behavioral control is intact.

## 2022-01-26 RX ORDER — TRAZODONE HCL 50 MG
50 TABLET ORAL AT BEDTIME
Refills: 0 | Status: DISCONTINUED | OUTPATIENT
Start: 2022-01-26 | End: 2022-02-15

## 2022-01-26 RX ORDER — ARIPIPRAZOLE 15 MG/1
882 TABLET ORAL ONCE
Refills: 0 | Status: COMPLETED | OUTPATIENT
Start: 2022-01-27 | End: 2022-01-27

## 2022-01-26 RX ADMIN — Medication 50 MILLIGRAM(S): at 20:21

## 2022-01-26 RX ADMIN — Medication 3 MILLIGRAM(S): at 20:21

## 2022-01-26 RX ADMIN — Medication 225 MILLIGRAM(S): at 08:14

## 2022-01-26 RX ADMIN — Medication 325 MILLIGRAM(S): at 08:14

## 2022-01-26 RX ADMIN — Medication 50 MILLIGRAM(S): at 08:14

## 2022-01-26 NOTE — BH INPATIENT PSYCHIATRY PROGRESS NOTE - NSBHCHARTREVIEWVS_PSY_A_CORE FT
Vital Signs Last 24 Hrs  T(C): 36.4 (01-26-22 @ 08:19), Max: 36.9 (01-25-22 @ 19:20)  T(F): 97.5 (01-26-22 @ 08:19), Max: 98.5 (01-25-22 @ 19:20)  HR: 116 (01-26-22 @ 08:19) (116 - 116)  BP: 126/91 (01-26-22 @ 08:19) (126/91 - 126/91)  BP(mean): --  RR: --  SpO2: --    Orthostatic VS  01-26-22 @ 11:45  Lying BP: --/-- HR: --  Sitting BP: 139/78 HR: 98  Standing BP: 129/78 HR: 95  Site: --  Mode: --  Orthostatic VS  01-25-22 @ 19:20  Lying BP: --/-- HR: --  Sitting BP: 151/84 HR: 86  Standing BP: --/-- HR: --  Site: upper left arm  Mode: electronic  Orthostatic VS  01-25-22 @ 08:14  Lying BP: --/-- HR: --  Sitting BP: 157/84 HR: 78  Standing BP: --/-- HR: --  Site: --  Mode: --  Orthostatic VS  01-24-22 @ 18:46  Lying BP: --/-- HR: --  Sitting BP: 134/79 HR: 85  Standing BP: --/-- HR: --  Site: --  Mode: --   Vital Signs Last 24 Hrs  T(C): 36.5 (01-27-22 @ 08:35), Max: 37.4 (01-26-22 @ 19:31)  T(F): 97.7 (01-27-22 @ 08:35), Max: 99.3 (01-26-22 @ 19:31)  HR: 98 (01-27-22 @ 08:35) (98 - 98)  BP: 156/83 (01-27-22 @ 08:35) (156/83 - 156/83)  BP(mean): --  RR: --  SpO2: --    Orthostatic VS  01-26-22 @ 19:31  Lying BP: --/-- HR: --  Sitting BP: 142/86 HR: 79  Standing BP: 139/81 HR: 91  Site: --  Mode: --  Orthostatic VS  01-26-22 @ 11:45  Lying BP: --/-- HR: --  Sitting BP: 139/78 HR: 98  Standing BP: 129/78 HR: 95  Site: --  Mode: --  Orthostatic VS  01-25-22 @ 19:20  Lying BP: --/-- HR: --  Sitting BP: 151/84 HR: 86  Standing BP: --/-- HR: --  Site: upper left arm  Mode: electronic

## 2022-01-26 NOTE — BH INPATIENT PSYCHIATRY PROGRESS NOTE - NSBHMETABOLIC_PSY_ALL_CORE_FT
BMI:   HbA1c: A1C with Estimated Average Glucose Result: 5.4 % (12-21-21 @ 09:46)    Glucose: POCT Blood Glucose.: 95 mg/dL (11-30-21 @ 05:53)    BP: 126/91 (01-26-22 @ 08:19) (126/91 - 127/76)  Lipid Panel: Date/Time: 12-21-21 @ 09:46  Cholesterol, Serum: 148  Direct LDL: --  HDL Cholesterol, Serum: 40  Total Cholesterol/HDL Ration Measurement: --  Triglycerides, Serum: 55   BMI:   HbA1c: A1C with Estimated Average Glucose Result: 5.4 % (12-21-21 @ 09:46)    Glucose: POCT Blood Glucose.: 95 mg/dL (11-30-21 @ 05:53)    BP: 156/83 (01-27-22 @ 08:35) (126/91 - 156/83)  Lipid Panel: Date/Time: 12-21-21 @ 09:46  Cholesterol, Serum: 148  Direct LDL: --  HDL Cholesterol, Serum: 40  Total Cholesterol/HDL Ration Measurement: --  Triglycerides, Serum: 55

## 2022-01-26 NOTE — BH INPATIENT PSYCHIATRY PROGRESS NOTE - NSBHFUPINTERVALHXFT_PSY_A_CORE
No acute overnight events. Patient compliant with medications and in good behavioral control. Pt says that today he feels "sad." Says that he would like to stay in the hospital and get ECT because he continues to feel depressed and paranoid that people are saying bad things about him. Says that he feels hopeless. His appetite is good. Says he is sleeping poorly. Denies passive death wish or SI with intent or plan but does endorse conditional SI when thinking about being homeless again. Denies AVH or HI.

## 2022-01-26 NOTE — BH INPATIENT PSYCHIATRY PROGRESS NOTE - PRN MEDS
MEDICATIONS  (PRN):  acetaminophen     Tablet .. 650 milliGRAM(s) Oral every 6 hours PRN Mild Pain (1 - 3), Moderate Pain (4 - 6)  aluminum hydroxide/magnesium hydroxide/simethicone Suspension 30 milliLiter(s) Oral every 4 hours PRN Dyspepsia  diphenhydrAMINE Injectable 50 milliGRAM(s) IntraMuscular once PRN agitation  haloperidol    Injectable 5 milliGRAM(s) IntraMuscular once PRN severe agitation  loperamide 2 milliGRAM(s) Oral every 4 hours PRN diarrhea  LORazepam   Injectable 2 milliGRAM(s) IntraMuscular once PRN severe agitation  nicotine  Polacrilex Gum 2 milliGRAM(s) Oral two times a day PRN Nicotine replacement  OLANZapine 5 milliGRAM(s) Oral every 6 hours PRN agitation  traZODone 50 milliGRAM(s) Oral at bedtime PRN Insomnia

## 2022-01-26 NOTE — BH INPATIENT PSYCHIATRY PROGRESS NOTE - CASE SUMMARY
Pt with some improvements in depression and psychosis though such improvement appear only transient and overall pt remains with very low mood, intermittent passive SI, hopelessness and with ongoing paranoia and ideas of reference (remains very distressed over belief that patients and staff are talking about him negatively outside of his room, belief that hospital is "kicking me out" everyday), also guilt that borders on pathological/delusional in nature. Monthly Aristada 882mg due tomorrow 1/27. Will c/w Effexor 225mg daily and Atarax 50mg BID for anxiety. D/c Depakote given no clear need/indication. Still considering ECT to treat psychotic depression.

## 2022-01-26 NOTE — BH INPATIENT PSYCHIATRY PROGRESS NOTE - CURRENT MEDICATION
MEDICATIONS  (STANDING):  ferrous    sulfate 325 milliGRAM(s) Oral daily  hydrOXYzine hydrochloride 50 milliGRAM(s) Oral two times a day  melatonin. 3 milliGRAM(s) Oral at bedtime  venlafaxine  milliGRAM(s) Oral daily    MEDICATIONS  (PRN):  acetaminophen     Tablet .. 650 milliGRAM(s) Oral every 6 hours PRN Mild Pain (1 - 3), Moderate Pain (4 - 6)  aluminum hydroxide/magnesium hydroxide/simethicone Suspension 30 milliLiter(s) Oral every 4 hours PRN Dyspepsia  diphenhydrAMINE Injectable 50 milliGRAM(s) IntraMuscular once PRN agitation  haloperidol    Injectable 5 milliGRAM(s) IntraMuscular once PRN severe agitation  loperamide 2 milliGRAM(s) Oral every 4 hours PRN diarrhea  LORazepam   Injectable 2 milliGRAM(s) IntraMuscular once PRN severe agitation  nicotine  Polacrilex Gum 2 milliGRAM(s) Oral two times a day PRN Nicotine replacement  OLANZapine 5 milliGRAM(s) Oral every 6 hours PRN agitation  traZODone 50 milliGRAM(s) Oral at bedtime PRN Insomnia   MEDICATIONS  (STANDING):  ARIPiprazole lauroxil Injectable, Long Acting (ARISTADA) 882 milliGRAM(s) IntraMuscular once  ferrous    sulfate 325 milliGRAM(s) Oral daily  hydrOXYzine hydrochloride 50 milliGRAM(s) Oral two times a day  melatonin. 3 milliGRAM(s) Oral at bedtime  venlafaxine  milliGRAM(s) Oral daily    MEDICATIONS  (PRN):  acetaminophen     Tablet .. 650 milliGRAM(s) Oral every 6 hours PRN Mild Pain (1 - 3), Moderate Pain (4 - 6)  aluminum hydroxide/magnesium hydroxide/simethicone Suspension 30 milliLiter(s) Oral every 4 hours PRN Dyspepsia  diphenhydrAMINE Injectable 50 milliGRAM(s) IntraMuscular once PRN agitation  haloperidol    Injectable 5 milliGRAM(s) IntraMuscular once PRN severe agitation  loperamide 2 milliGRAM(s) Oral every 4 hours PRN diarrhea  LORazepam   Injectable 2 milliGRAM(s) IntraMuscular once PRN severe agitation  nicotine  Polacrilex Gum 2 milliGRAM(s) Oral two times a day PRN Nicotine replacement  OLANZapine 5 milliGRAM(s) Oral every 6 hours PRN agitation  traZODone 50 milliGRAM(s) Oral at bedtime PRN Insomnia

## 2022-01-26 NOTE — BH INPATIENT PSYCHIATRY PROGRESS NOTE - NSBHASSESSSUMMFT_PSY_ALL_CORE
Pt remains in good behavioral control and is cooperative and pleasant. Psychosis also remains improved though pt with ongoing persecutory and referential delusions (though without full delusional conviction, more in the realm of paranoid ideation). Pt remains depressed with passive conditional SI when thinking about relapsing though appearing more hopeful when thinking about family. Most likely 2/2 MDD with psychosis. Team continues to provide reassurance and reality testing. Neg. Romberg test regarding dizziness (1/19). Cigarette cravings starting back 1/19. Overall has improved since admission but has plateaued in improvement in the last couple of weeks with limited improvement in depression and paranoia symptoms.    Plan:  1. Pt s/p Aristada loading on 12/30/21, next monthly Aristada 882mg due tomorrow 1/27/22.   2. D/c Depakote tonight  3. Continue Effexor XR to 225 mg daily  4. C/w Atarax 50mg BID for anxiety  5. Nicorette gum for cigarette cravings (1/20/22)  6. Melatonin 3mg QHs for insomnia  7. Pursuing ECT   8. With regards to vague physical complaints - pt does not objectively appear unwell or unsteady. He is eating and drinking, and vitals and recent labs (including CBC, CMP, B12, folate, ammonia level) have been within normal limits. Pt's confusion and AMS that he initially presented with have also improved significantly/resolved. Pt remains on iron supplementation and Hb has been uptrending. Given that pt presented to [facility] prior to current hospitalization with AMS, r/o dx include Wernicke's encephalopathy, however he was treated with IV thiamine during hospitalization at [facility] (and oral supplementation just recently d/c). Will continue to monitor. Of note MMSE (1/10) at 27. Repeat CBC and CMP this AM within normal limits.   9. Dispo - to rehab in [place], pending placement

## 2022-01-27 LAB
ALBUMIN SERPL ELPH-MCNC: 4.1 G/DL — SIGNIFICANT CHANGE UP (ref 3.3–5)
ALP SERPL-CCNC: 61 U/L — SIGNIFICANT CHANGE UP (ref 40–120)
ALT FLD-CCNC: 24 U/L — SIGNIFICANT CHANGE UP (ref 4–41)
ANION GAP SERPL CALC-SCNC: 8 MMOL/L — SIGNIFICANT CHANGE UP (ref 7–14)
AST SERPL-CCNC: 19 U/L — SIGNIFICANT CHANGE UP (ref 4–40)
BILIRUB SERPL-MCNC: 0.2 MG/DL — SIGNIFICANT CHANGE UP (ref 0.2–1.2)
BUN SERPL-MCNC: 14 MG/DL — SIGNIFICANT CHANGE UP (ref 7–23)
CALCIUM SERPL-MCNC: 9 MG/DL — SIGNIFICANT CHANGE UP (ref 8.4–10.5)
CHLORIDE SERPL-SCNC: 101 MMOL/L — SIGNIFICANT CHANGE UP (ref 98–107)
CO2 SERPL-SCNC: 26 MMOL/L — SIGNIFICANT CHANGE UP (ref 22–31)
CREAT SERPL-MCNC: 0.63 MG/DL — SIGNIFICANT CHANGE UP (ref 0.5–1.3)
GLUCOSE SERPL-MCNC: 88 MG/DL — SIGNIFICANT CHANGE UP (ref 70–99)
HCT VFR BLD CALC: 45.7 % — SIGNIFICANT CHANGE UP (ref 39–50)
HGB BLD-MCNC: 14.1 G/DL — SIGNIFICANT CHANGE UP (ref 13–17)
MAGNESIUM SERPL-MCNC: 1.7 MG/DL — SIGNIFICANT CHANGE UP (ref 1.6–2.6)
MCHC RBC-ENTMCNC: 23.2 PG — LOW (ref 27–34)
MCHC RBC-ENTMCNC: 30.9 GM/DL — LOW (ref 32–36)
MCV RBC AUTO: 75.2 FL — LOW (ref 80–100)
NRBC # BLD: 0 /100 WBCS — SIGNIFICANT CHANGE UP
NRBC # FLD: 0 K/UL — SIGNIFICANT CHANGE UP
PLATELET # BLD AUTO: 209 K/UL — SIGNIFICANT CHANGE UP (ref 150–400)
POTASSIUM SERPL-MCNC: 4 MMOL/L — SIGNIFICANT CHANGE UP (ref 3.5–5.3)
POTASSIUM SERPL-SCNC: 4 MMOL/L — SIGNIFICANT CHANGE UP (ref 3.5–5.3)
PROT SERPL-MCNC: 6.7 G/DL — SIGNIFICANT CHANGE UP (ref 6–8.3)
RBC # BLD: 6.08 M/UL — HIGH (ref 4.2–5.8)
RBC # FLD: 23.8 % — HIGH (ref 10.3–14.5)
SARS-COV-2 RNA SPEC QL NAA+PROBE: SIGNIFICANT CHANGE UP
SODIUM SERPL-SCNC: 135 MMOL/L — SIGNIFICANT CHANGE UP (ref 135–145)
WBC # BLD: 9.49 K/UL — SIGNIFICANT CHANGE UP (ref 3.8–10.5)
WBC # FLD AUTO: 9.49 K/UL — SIGNIFICANT CHANGE UP (ref 3.8–10.5)

## 2022-01-27 PROCEDURE — 99232 SBSQ HOSP IP/OBS MODERATE 35: CPT | Mod: 25

## 2022-01-27 PROCEDURE — 93010 ELECTROCARDIOGRAM REPORT: CPT

## 2022-01-27 RX ADMIN — Medication 225 MILLIGRAM(S): at 08:34

## 2022-01-27 RX ADMIN — ARIPIPRAZOLE 882 MILLIGRAM(S): 15 TABLET ORAL at 15:00

## 2022-01-27 RX ADMIN — Medication 50 MILLIGRAM(S): at 20:59

## 2022-01-27 RX ADMIN — Medication 50 MILLIGRAM(S): at 08:34

## 2022-01-27 RX ADMIN — Medication 325 MILLIGRAM(S): at 08:34

## 2022-01-27 RX ADMIN — Medication 3 MILLIGRAM(S): at 20:59

## 2022-01-27 NOTE — BH INPATIENT PSYCHIATRY PROGRESS NOTE - NSBHCHARTREVIEWVS_PSY_A_CORE FT
Vital Signs Last 24 Hrs  T(C): 36.5 (01-27-22 @ 08:35), Max: 37.4 (01-26-22 @ 19:31)  T(F): 97.7 (01-27-22 @ 08:35), Max: 99.3 (01-26-22 @ 19:31)  HR: 98 (01-27-22 @ 08:35) (98 - 98)  BP: 156/83 (01-27-22 @ 08:35) (156/83 - 156/83)  BP(mean): --  RR: --  SpO2: --    Orthostatic VS  01-26-22 @ 19:31  Lying BP: --/-- HR: --  Sitting BP: 142/86 HR: 79  Standing BP: 139/81 HR: 91  Site: --  Mode: --  Orthostatic VS  01-26-22 @ 11:45  Lying BP: --/-- HR: --  Sitting BP: 139/78 HR: 98  Standing BP: 129/78 HR: 95  Site: --  Mode: --  Orthostatic VS  01-25-22 @ 19:20  Lying BP: --/-- HR: --  Sitting BP: 151/84 HR: 86  Standing BP: --/-- HR: --  Site: upper left arm  Mode: electronic   Vital Signs Last 24 Hrs  T(C): 36.1 (01-28-22 @ 12:02), Max: 36.6 (01-27-22 @ 19:21)  T(F): 96.9 (01-28-22 @ 12:02), Max: 97.9 (01-28-22 @ 11:49)  HR: 105 (01-28-22 @ 12:30) (85 - 105)  BP: 150/92 (01-28-22 @ 12:30) (139/83 - 150/92)  BP(mean): --  RR: 20 (01-28-22 @ 12:30) (17 - 22)  SpO2: 93% (01-28-22 @ 12:30) (93% - 100%)    Orthostatic VS  01-28-22 @ 08:16  Lying BP: --/-- HR: --  Sitting BP: 137/81 HR: 79  Standing BP: --/-- HR: --  Site: --  Mode: --  Orthostatic VS  01-27-22 @ 19:21  Lying BP: --/-- HR: --  Sitting BP: 125/79 HR: 85  Standing BP: 121/70 HR: 87  Site: upper right arm  Mode: electronic  Orthostatic VS  01-26-22 @ 19:31  Lying BP: --/-- HR: --  Sitting BP: 142/86 HR: 79  Standing BP: 139/81 HR: 91  Site: --  Mode: --

## 2022-01-27 NOTE — BH INPATIENT PSYCHIATRY PROGRESS NOTE - NSBHMETABOLIC_PSY_ALL_CORE_FT
BMI:   HbA1c: A1C with Estimated Average Glucose Result: 5.4 % (12-21-21 @ 09:46)    Glucose: POCT Blood Glucose.: 95 mg/dL (11-30-21 @ 05:53)    BP: 156/83 (01-27-22 @ 08:35) (126/91 - 156/83)  Lipid Panel: Date/Time: 12-21-21 @ 09:46  Cholesterol, Serum: 148  Direct LDL: --  HDL Cholesterol, Serum: 40  Total Cholesterol/HDL Ration Measurement: --  Triglycerides, Serum: 55   BMI:   HbA1c: A1C with Estimated Average Glucose Result: 5.4 % (12-21-21 @ 09:46)    Glucose: POCT Blood Glucose.: 95 mg/dL (11-30-21 @ 05:53)    BP: 150/92 (01-28-22 @ 12:30) (126/91 - 156/83)  Lipid Panel: Date/Time: 12-21-21 @ 09:46  Cholesterol, Serum: 148  Direct LDL: --  HDL Cholesterol, Serum: 40  Total Cholesterol/HDL Ration Measurement: --  Triglycerides, Serum: 55

## 2022-01-27 NOTE — BH INPATIENT PSYCHIATRY PROGRESS NOTE - NSBHASSESSSUMMFT_PSY_ALL_CORE
Pt remains in good behavioral control and is cooperative and pleasant. Psychosis also remains improved though pt with ongoing persecutory and referential delusions (though without full delusional conviction, more in the realm of paranoid ideation). Pt remains depressed with passive conditional SI when thinking about relapsing though appearing more hopeful when thinking about family. Most likely 2/2 MDD with psychosis. Team continues to provide reassurance and reality testing. Neg. Romberg test regarding dizziness (1/19). Cigarette cravings starting back 1/19. Overall has improved since admission but has plateaued in improvement in the last couple of weeks with limited improvement in depression and paranoia symptoms, asking for ECT.    Plan:  1. Pt s/p Aristada loading on 12/30/21, monthly Aristada 882mg today 1/27/22. Next dose due 2/27/22.  3. Continue Effexor XR to 225 mg daily  4. C/w Atarax 50mg BID for anxiety  5. Nicorette gum for cigarette cravings (1/20/22)  6. Melatonin 3mg QHs for insomnia  7. ECT   -CBC, CMP, COVID-19 PCR, and EKG today  -ECT consult today  -Ordered ECT for tomorrow pending consult  8. With regards to vague physical complaints - pt does not objectively appear unwell or unsteady. He is eating and drinking, and vitals and recent labs (including CBC, CMP, B12, folate, ammonia level) have been within normal limits. Pt's confusion and AMS that he initially presented with have also improved significantly/resolved. Pt remains on iron supplementation and Hb has been uptrending. Given that pt presented to [facility] prior to current hospitalization with AMS, r/o dx include Wernicke's encephalopathy, however he was treated with IV thiamine during hospitalization at [facility] (and oral supplementation just recently d/c). Will continue to monitor. Of note MMSE (1/10) at 27 and2/27 was 29. Repeat CBC and CMP wnl 1/27/22.  9. Dispo - to rehab in [place], pending placement

## 2022-01-27 NOTE — ECT CONSULT NOTE - NSECTASSESSRECOMM_PSY_ALL_CORE
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        More than 50% of the time was spent in counselling and/or coordination of care, including but not limited to discussing with patient and family risk and benefits of ECT, the usual trajectory of response with acute course of ECT, obtaining informed consent for ECT, reviewing ECT fasting guidelines, reviewing the need for periodic history and physical and labwork. Patient was asked to obtain labwork (CBC, BMP), EKG and medical clearance.  Referring psychiatrist was contacted.     Will see dental for poor dentita for clearance.  Anesthesia should be aware of metal plate in neck to ensure they can maintain an airway during each procedure.

## 2022-01-27 NOTE — ECT CONSULT NOTE - NSECTMENTALSTATUSEXAM_PSY_ALL_CORE
Conscious, cooperative, alert.   No psychomotor agitation/retardation.   Good eye contact.   Speech: regular rate and rhythm,   Mood: "Depressed" Affect: appropriate, full range.   Thought Process: linear, goal directed   Thought Content: No Death wish, No Suicidal ideation/intent/plan, No homicidal ideation/intent/plan. No delusions   Perception: No hallucinations   Insight and Judgement: fair  Impulse Control: fair at this time.

## 2022-01-27 NOTE — ECT CONSULT NOTE - NSBHSOCIALHXDETAILSFT_PSY_A_CORE
h/o gastric bypass 2007; surgical plate in lower C-spine (pt unsure of exact location but states it was at the bottom on neck; able to look up from seated position but has limited range of movement); h/o shoulder repair

## 2022-01-27 NOTE — ECT CONSULT NOTE - DESCRIPTION
R/O Wernicke's encephalopathy.  Per pt had past dx of DM and HTN but now after gastric bypass in 2007 and wt loss from 400lb, sugar and BP are stable    ALL: "oxycontin" with reaction of severe diarrhea (side effect not clear allergy)  No h/o adverse reaction to anesthesia.

## 2022-01-27 NOTE — BH INPATIENT PSYCHIATRY PROGRESS NOTE - MSE UNSTRUCTURED FT
Patient is a 52yo man who appears his stated age. He is alert and oriented to person, place, and time with fair grooming and hygiene. Patient is cooperative and calm. Speech is normal volume and rate. Patient describes his mood as "depressed" and affect is blunted, depressed and is congruent with mood. Patient is linear and goal-directed. Thought content includes hopelessness and worthlessness but denying passive/active SI (does endorse conditional SI about being homeless). Maintains paranoid ideations about being talked about while he's inside his room. A&Ox3. Memory intact. Insight and judgment are fair. Behavioral control is intact.     MMSE completed 1/27/22 - 29/30
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
## 2022-01-27 NOTE — BH INPATIENT PSYCHIATRY PROGRESS NOTE - CURRENT MEDICATION
MEDICATIONS  (STANDING):  ARIPiprazole lauroxil Injectable, Long Acting (ARISTADA) 882 milliGRAM(s) IntraMuscular once  ferrous    sulfate 325 milliGRAM(s) Oral daily  hydrOXYzine hydrochloride 50 milliGRAM(s) Oral two times a day  melatonin. 3 milliGRAM(s) Oral at bedtime  venlafaxine  milliGRAM(s) Oral daily    MEDICATIONS  (PRN):  acetaminophen     Tablet .. 650 milliGRAM(s) Oral every 6 hours PRN Mild Pain (1 - 3), Moderate Pain (4 - 6)  aluminum hydroxide/magnesium hydroxide/simethicone Suspension 30 milliLiter(s) Oral every 4 hours PRN Dyspepsia  diphenhydrAMINE Injectable 50 milliGRAM(s) IntraMuscular once PRN agitation  haloperidol    Injectable 5 milliGRAM(s) IntraMuscular once PRN severe agitation  loperamide 2 milliGRAM(s) Oral every 4 hours PRN diarrhea  LORazepam   Injectable 2 milliGRAM(s) IntraMuscular once PRN severe agitation  nicotine  Polacrilex Gum 2 milliGRAM(s) Oral two times a day PRN Nicotine replacement  OLANZapine 5 milliGRAM(s) Oral every 6 hours PRN agitation  traZODone 50 milliGRAM(s) Oral at bedtime PRN Insomnia   MEDICATIONS  (STANDING):  ferrous    sulfate 325 milliGRAM(s) Oral daily  hydrOXYzine hydrochloride 50 milliGRAM(s) Oral two times a day  melatonin. 3 milliGRAM(s) Oral at bedtime  venlafaxine  milliGRAM(s) Oral daily    MEDICATIONS  (PRN):  acetaminophen     Tablet .. 650 milliGRAM(s) Oral every 6 hours PRN Mild Pain (1 - 3), Moderate Pain (4 - 6)  aluminum hydroxide/magnesium hydroxide/simethicone Suspension 30 milliLiter(s) Oral every 4 hours PRN Dyspepsia  diphenhydrAMINE Injectable 50 milliGRAM(s) IntraMuscular once PRN agitation  haloperidol    Injectable 5 milliGRAM(s) IntraMuscular once PRN severe agitation  loperamide 2 milliGRAM(s) Oral every 4 hours PRN diarrhea  LORazepam   Injectable 2 milliGRAM(s) IntraMuscular once PRN severe agitation  nicotine  Polacrilex Gum 2 milliGRAM(s) Oral two times a day PRN Nicotine replacement  OLANZapine 5 milliGRAM(s) Oral every 6 hours PRN agitation  traZODone 50 milliGRAM(s) Oral at bedtime PRN Insomnia

## 2022-01-27 NOTE — ECT CONSULT NOTE - CURRENT MEDICATION
MEDICATIONS  (STANDING):  ferrous    sulfate 325 milliGRAM(s) Oral daily  hydrOXYzine hydrochloride 50 milliGRAM(s) Oral two times a day  melatonin. 3 milliGRAM(s) Oral at bedtime  venlafaxine  milliGRAM(s) Oral daily    MEDICATIONS  (PRN):  acetaminophen     Tablet .. 650 milliGRAM(s) Oral every 6 hours PRN Mild Pain (1 - 3), Moderate Pain (4 - 6)  aluminum hydroxide/magnesium hydroxide/simethicone Suspension 30 milliLiter(s) Oral every 4 hours PRN Dyspepsia  diphenhydrAMINE Injectable 50 milliGRAM(s) IntraMuscular once PRN agitation  haloperidol    Injectable 5 milliGRAM(s) IntraMuscular once PRN severe agitation  loperamide 2 milliGRAM(s) Oral every 4 hours PRN diarrhea  LORazepam   Injectable 2 milliGRAM(s) IntraMuscular once PRN severe agitation  nicotine  Polacrilex Gum 2 milliGRAM(s) Oral two times a day PRN Nicotine replacement  OLANZapine 5 milliGRAM(s) Oral every 6 hours PRN agitation  traZODone 50 milliGRAM(s) Oral at bedtime PRN Insomnia

## 2022-01-27 NOTE — BH INPATIENT PSYCHIATRY PROGRESS NOTE - NSBHFUPINTERVALHXFT_PSY_A_CORE
No acute overnight events. Patient compliant with medications and in good behavioral control. Pt says that today he feels "hopeless." Continues to have low mood and is feeling paranoid, thinking that everyone is out to get him/talking about him. Said throughout interview, "I must have done something bad in my past life to have this life" and is conditionally suicidal at the thought of being on the streets again. Denies current passive death wish or SI with intent or plan. Denies AVH or HI. Appetite is good. Sleep is okay. Was seen in groups yesterday conversing with other patients. Says that he wants to start ECT as quickly as possible because he does not want to continue living this way.

## 2022-01-28 PROCEDURE — 90870 ELECTROCONVULSIVE THERAPY: CPT

## 2022-01-28 PROCEDURE — 90853 GROUP PSYCHOTHERAPY: CPT

## 2022-01-28 PROCEDURE — 99232 SBSQ HOSP IP/OBS MODERATE 35: CPT | Mod: 25

## 2022-01-28 RX ORDER — MIDAZOLAM HYDROCHLORIDE 1 MG/ML
4 INJECTION, SOLUTION INTRAMUSCULAR; INTRAVENOUS ONCE
Refills: 0 | Status: DISCONTINUED | OUTPATIENT
Start: 2022-01-28 | End: 2022-01-28

## 2022-01-28 RX ADMIN — Medication 225 MILLIGRAM(S): at 08:02

## 2022-01-28 RX ADMIN — Medication 50 MILLIGRAM(S): at 20:28

## 2022-01-28 RX ADMIN — MIDAZOLAM HYDROCHLORIDE 4 MILLIGRAM(S): 1 INJECTION, SOLUTION INTRAMUSCULAR; INTRAVENOUS at 12:14

## 2022-01-28 RX ADMIN — Medication 650 MILLIGRAM(S): at 14:03

## 2022-01-28 RX ADMIN — Medication 3 MILLIGRAM(S): at 20:28

## 2022-01-28 RX ADMIN — Medication 325 MILLIGRAM(S): at 08:02

## 2022-01-28 RX ADMIN — Medication 50 MILLIGRAM(S): at 08:02

## 2022-01-28 NOTE — BH INPATIENT PSYCHIATRY PROGRESS NOTE - NSBHMETABOLIC_PSY_ALL_CORE_FT
BMI:   HbA1c: A1C with Estimated Average Glucose Result: 5.4 % (12-21-21 @ 09:46)    Glucose: POCT Blood Glucose.: 95 mg/dL (11-30-21 @ 05:53)    BP: 156/83 (01-27-22 @ 08:35) (126/91 - 156/83)  Lipid Panel: Date/Time: 12-21-21 @ 09:46  Cholesterol, Serum: 148  Direct LDL: --  HDL Cholesterol, Serum: 40  Total Cholesterol/HDL Ration Measurement: --  Triglycerides, Serum: 55   BMI:   HbA1c: A1C with Estimated Average Glucose Result: 5.4 % (12-21-21 @ 09:46)    Glucose: POCT Blood Glucose.: 95 mg/dL (11-30-21 @ 05:53)    BP: 139/87 (01-28-22 @ 13:00) (126/91 - 156/83)  Lipid Panel: Date/Time: 12-21-21 @ 09:46  Cholesterol, Serum: 148  Direct LDL: --  HDL Cholesterol, Serum: 40  Total Cholesterol/HDL Ration Measurement: --  Triglycerides, Serum: 55

## 2022-01-28 NOTE — BH INPATIENT PSYCHIATRY PROGRESS NOTE - NSBHASSESSSUMMFT_PSY_ALL_CORE
Pt remains in good behavioral control and is cooperative and pleasant. Psychosis also remains improved though pt with ongoing persecutory and referential delusions (though without full delusional conviction, more in the realm of paranoid ideation). Pt remains depressed with passive conditional SI when thinking about relapsing though appearing more hopeful when thinking about family. Most likely 2/2 MDD with psychosis. Team continues to provide reassurance and reality testing. Neg. Romberg test regarding dizziness (1/19). Cigarette cravings starting back 1/19. Today will get ECT and is hopeful for it.     Plan:  1. Pt s/p Aristada loading on 12/30/21, monthly Aristada 882mg 1/27/22. Next dose due 2/27/22.  3. Continue Effexor XR to 225 mg daily  4. C/w Atarax 50mg BID for anxiety  5. Nicorette gum for cigarette cravings (1/20/22)  6. Melatonin 3mg QHs for insomnia  7. ECT   -CBC, CMP, COVID-19 PCR, and EKG wnl  -Dental consult today  -ECT today and then MW  8. With regards to vague physical complaints - pt does not objectively appear unwell or unsteady. He is eating and drinking, and vitals and recent labs (including CBC, CMP, B12, folate, ammonia level) have been within normal limits. Pt's confusion and AMS that he initially presented with have also improved significantly/resolved. Pt remains on iron supplementation and Hb has been uptrending. Given that pt presented to [facility] prior to current hospitalization with AMS, r/o dx include Wernicke's encephalopathy, however he was treated with IV thiamine during hospitalization at [facility] (and oral supplementation just recently d/c). Will continue to monitor. Of note MMSE (1/10) at 27 and2/27 was 29. Repeat CBC and CMP wnl 1/27/22.  9. Dispo - to rehab in [place], pending placement

## 2022-01-28 NOTE — BH INPATIENT PSYCHIATRY PROGRESS NOTE - MSE UNSTRUCTURED FT
Patient is a 50yo man who appears his stated age. He is alert and oriented to person, place, and time with fair grooming and hygiene. Patient is cooperative and calm. Speech is normal volume and rate. Patient describes his mood as "hopeful because of ECT" and affect is constricted, depressed, slightly hopeful and is congruent with mood. Patient is linear and goal-directed. Thought content denies passive/active SI (does endorse conditional SI about being homeless). Maintains paranoid ideations about being talked about while he's inside his room. A&Ox3. Memory intact. Insight and judgment are fair. Behavioral control is intact.     MMSE completed 1/27/22 - 29/30

## 2022-01-28 NOTE — BH INPATIENT PSYCHIATRY PROGRESS NOTE - NSBHCHARTREVIEWVS_PSY_A_CORE FT
Vital Signs Last 24 Hrs  T(C): 36.1 (01-28-22 @ 08:16), Max: 36.6 (01-27-22 @ 19:21)  T(F): 96.9 (01-28-22 @ 08:16), Max: 97.8 (01-27-22 @ 19:21)  HR: --  BP: --  BP(mean): --  RR: --  SpO2: --    Orthostatic VS  01-28-22 @ 08:16  Lying BP: --/-- HR: --  Sitting BP: 137/81 HR: 79  Standing BP: --/-- HR: --  Site: --  Mode: --  Orthostatic VS  01-27-22 @ 19:21  Lying BP: --/-- HR: --  Sitting BP: 125/79 HR: 85  Standing BP: 121/70 HR: 87  Site: upper right arm  Mode: electronic  Orthostatic VS  01-26-22 @ 19:31  Lying BP: --/-- HR: --  Sitting BP: 142/86 HR: 79  Standing BP: 139/81 HR: 91  Site: --  Mode: --  Orthostatic VS  01-26-22 @ 11:45  Lying BP: --/-- HR: --  Sitting BP: 139/78 HR: 98  Standing BP: 129/78 HR: 95  Site: --  Mode: --   Vital Signs Last 24 Hrs  T(C): 36.4 (01-28-22 @ 13:00), Max: 36.6 (01-27-22 @ 19:21)  T(F): 97.5 (01-28-22 @ 13:00), Max: 97.9 (01-28-22 @ 11:49)  HR: 86 (01-28-22 @ 13:00) (85 - 105)  BP: 139/87 (01-28-22 @ 13:00) (139/83 - 150/92)  BP(mean): --  RR: 18 (01-28-22 @ 13:00) (17 - 22)  SpO2: 98% (01-28-22 @ 13:00) (93% - 100%)    Orthostatic VS  01-28-22 @ 13:26  Lying BP: --/-- HR: --  Sitting BP: 140/90 HR: 93  Standing BP: --/-- HR: --  Site: --  Mode: --  Orthostatic VS  01-28-22 @ 08:16  Lying BP: --/-- HR: --  Sitting BP: 137/81 HR: 79  Standing BP: --/-- HR: --  Site: --  Mode: --  Orthostatic VS  01-27-22 @ 19:21  Lying BP: --/-- HR: --  Sitting BP: 125/79 HR: 85  Standing BP: 121/70 HR: 87  Site: upper right arm  Mode: electronic  Orthostatic VS  01-26-22 @ 19:31  Lying BP: --/-- HR: --  Sitting BP: 142/86 HR: 79  Standing BP: 139/81 HR: 91  Site: --  Mode: --

## 2022-01-28 NOTE — BH INPATIENT PSYCHIATRY PROGRESS NOTE - CASE SUMMARY
Pt remains depressed with passive death wish, though hopeful that ECT will help his symptoms. Remains with paranoia and referential beliefs though with some improved insight. Largely isolative though has been attending groups and noted to make more of an effort to spend time in unit community on occasion. Remains in good behavioral control. Motivated to attend rehab upon discharge. C/w Effexor (now at max dose) and monthly Aristada. Dental consult scheduled for this morning and ECT #1 this afternoon.

## 2022-01-28 NOTE — BH INPATIENT PSYCHIATRY PROGRESS NOTE - NSBHFUPINTERVALHXFT_PSY_A_CORE
No acute overnight events. Patient compliant with medications and in good behavioral control. Pt says that today he feels "hopeful for ECT." Continues to have low mood and is feeling paranoid, but thinks that he is able to think that some of these thoughts are likely untrue. Excited for ECT today, thinking that he will feel better afterwards. No current passive death wish or SI with intent or plan. Denies AVH or HI. Appetite is good. Sleep is okay.

## 2022-01-29 RX ADMIN — Medication 225 MILLIGRAM(S): at 08:11

## 2022-01-29 RX ADMIN — Medication 325 MILLIGRAM(S): at 08:10

## 2022-01-29 RX ADMIN — Medication 3 MILLIGRAM(S): at 20:33

## 2022-01-29 RX ADMIN — Medication 50 MILLIGRAM(S): at 20:33

## 2022-01-29 RX ADMIN — Medication 50 MILLIGRAM(S): at 08:11

## 2022-01-30 LAB — SARS-COV-2 RNA SPEC QL NAA+PROBE: SIGNIFICANT CHANGE UP

## 2022-01-30 RX ADMIN — Medication 50 MILLIGRAM(S): at 07:46

## 2022-01-30 RX ADMIN — Medication 325 MILLIGRAM(S): at 07:45

## 2022-01-30 RX ADMIN — Medication 650 MILLIGRAM(S): at 07:52

## 2022-01-30 RX ADMIN — Medication 50 MILLIGRAM(S): at 20:43

## 2022-01-30 RX ADMIN — Medication 225 MILLIGRAM(S): at 07:45

## 2022-01-30 RX ADMIN — OLANZAPINE 5 MILLIGRAM(S): 15 TABLET, FILM COATED ORAL at 10:34

## 2022-01-30 RX ADMIN — Medication 3 MILLIGRAM(S): at 20:44

## 2022-01-31 PROCEDURE — 90870 ELECTROCONVULSIVE THERAPY: CPT

## 2022-01-31 PROCEDURE — 99232 SBSQ HOSP IP/OBS MODERATE 35: CPT | Mod: 25

## 2022-01-31 RX ORDER — MIDAZOLAM HYDROCHLORIDE 1 MG/ML
4 INJECTION, SOLUTION INTRAMUSCULAR; INTRAVENOUS ONCE
Refills: 0 | Status: DISCONTINUED | OUTPATIENT
Start: 2022-01-31 | End: 2022-01-31

## 2022-01-31 RX ORDER — MIDAZOLAM HYDROCHLORIDE 1 MG/ML
2 INJECTION, SOLUTION INTRAMUSCULAR; INTRAVENOUS ONCE
Refills: 0 | Status: DISCONTINUED | OUTPATIENT
Start: 2022-01-31 | End: 2022-01-31

## 2022-01-31 RX ADMIN — Medication 225 MILLIGRAM(S): at 08:02

## 2022-01-31 RX ADMIN — Medication 50 MILLIGRAM(S): at 20:09

## 2022-01-31 RX ADMIN — Medication 325 MILLIGRAM(S): at 08:03

## 2022-01-31 RX ADMIN — Medication 3 MILLIGRAM(S): at 20:09

## 2022-01-31 RX ADMIN — MIDAZOLAM HYDROCHLORIDE 2 MILLIGRAM(S): 1 INJECTION, SOLUTION INTRAMUSCULAR; INTRAVENOUS at 11:23

## 2022-01-31 RX ADMIN — Medication 50 MILLIGRAM(S): at 08:03

## 2022-01-31 NOTE — BH INPATIENT PSYCHIATRY PROGRESS NOTE - CURRENT MEDICATION
MEDICATIONS  (STANDING):  ferrous    sulfate 325 milliGRAM(s) Oral daily  hydrOXYzine hydrochloride 50 milliGRAM(s) Oral two times a day  melatonin. 3 milliGRAM(s) Oral at bedtime  midazolam Injectable 2 milliGRAM(s) IV Push once  venlafaxine  milliGRAM(s) Oral daily    MEDICATIONS  (PRN):  acetaminophen     Tablet .. 650 milliGRAM(s) Oral every 6 hours PRN Mild Pain (1 - 3), Moderate Pain (4 - 6)  aluminum hydroxide/magnesium hydroxide/simethicone Suspension 30 milliLiter(s) Oral every 4 hours PRN Dyspepsia  diphenhydrAMINE Injectable 50 milliGRAM(s) IntraMuscular once PRN agitation  haloperidol    Injectable 5 milliGRAM(s) IntraMuscular once PRN severe agitation  loperamide 2 milliGRAM(s) Oral every 4 hours PRN diarrhea  LORazepam   Injectable 2 milliGRAM(s) IntraMuscular once PRN severe agitation  nicotine  Polacrilex Gum 2 milliGRAM(s) Oral two times a day PRN Nicotine replacement  OLANZapine 5 milliGRAM(s) Oral every 6 hours PRN agitation  traZODone 50 milliGRAM(s) Oral at bedtime PRN Insomnia   MEDICATIONS  (STANDING):  ferrous    sulfate 325 milliGRAM(s) Oral daily  hydrOXYzine hydrochloride 50 milliGRAM(s) Oral two times a day  melatonin. 3 milliGRAM(s) Oral at bedtime  venlafaxine  milliGRAM(s) Oral daily    MEDICATIONS  (PRN):  acetaminophen     Tablet .. 650 milliGRAM(s) Oral every 6 hours PRN Mild Pain (1 - 3), Moderate Pain (4 - 6)  aluminum hydroxide/magnesium hydroxide/simethicone Suspension 30 milliLiter(s) Oral every 4 hours PRN Dyspepsia  diphenhydrAMINE Injectable 50 milliGRAM(s) IntraMuscular once PRN agitation  haloperidol    Injectable 5 milliGRAM(s) IntraMuscular once PRN severe agitation  loperamide 2 milliGRAM(s) Oral every 4 hours PRN diarrhea  LORazepam   Injectable 2 milliGRAM(s) IntraMuscular once PRN severe agitation  nicotine  Polacrilex Gum 2 milliGRAM(s) Oral two times a day PRN Nicotine replacement  OLANZapine 5 milliGRAM(s) Oral every 6 hours PRN agitation  traZODone 50 milliGRAM(s) Oral at bedtime PRN Insomnia

## 2022-01-31 NOTE — BH INPATIENT PSYCHIATRY PROGRESS NOTE - MSE UNSTRUCTURED FT
Patient is a 50yo man who appears his stated age. He is alert and oriented to person, place, and time with fair grooming and hygiene. Patient is cooperative and calm. Speech is normal volume and rate. Patient describes his mood as "hopeless" in the AM then "hopeful for ECT" later in the day and affect is constricted, depressed, slightly hopeful and is congruent with mood. Patient is linear and goal-directed. Thought content endorses passive death wish but denies active SI (does endorse conditional SI about being homeless). Maintains paranoid ideations about being talked about while he's inside his room. A&Ox3. Memory intact. Insight and judgment are fair. Behavioral control is intact.     MMSE completed 1/27/22 - 29/30

## 2022-01-31 NOTE — BH INPATIENT PSYCHIATRY PROGRESS NOTE - NSBHFUPINTERVALHXFT_PSY_A_CORE
No acute overnight events. Patient compliant with medications and in good behavioral control. Pt. has PRN Zyprexa this weekend for anxiety d/t "commotion on the floor." When seen early in the morning, pt. was feeling "worthless and hopeless." Says that he is paranoid and thinks that the other staff/pts dislike him and are talking about him. Has low mood and passive death wish with no SI with intent or plan. No AVH or HI. When speaking to him later in the day, pt. is more pleasant and hopeful for ECT, no longer feeling hopeless or worthless. Sleep and appetite are both good.

## 2022-01-31 NOTE — BH INPATIENT PSYCHIATRY PROGRESS NOTE - NSBHASSESSSUMMFT_PSY_ALL_CORE
Pt remains in good behavioral control and is cooperative and pleasant. Psychosis also remains improved though pt with ongoing persecutory and referential delusions (though without full delusional conviction, more in the realm of paranoid ideation). Pt remains depressed with passive conditional SI when thinking about relapsing though appearing more hopeful when thinking about family. Most likely 2/2 MDD with psychosis. Team continues to provide reassurance and reality testing. Neg. Romberg test regarding dizziness (1/19). Cigarette cravings starting back 1/19. Continues to struggle to be hopeful vs hopeless, but happy to get ECT and overall is doing better.    Plan:  1. Pt s/p Aristada loading on 12/30/21, monthly Aristada 882mg 1/27/22. Next dose due 2/27/22.  3. Continue Effexor XR to 225 mg daily  4. C/w Atarax 50mg BID for anxiety  5. Nicorette gum for cigarette cravings (1/20/22)  6. Melatonin 3mg QHs for insomnia  7. ECT MWF (started 1/28/22)  -CBC, CMP, COVID-19 PCR, and EKG wnl  -Dental consult- normal mouth guard  8. With regards to vague physical complaints - pt does not objectively appear unwell or unsteady. He is eating and drinking, and vitals and recent labs (including CBC, CMP, B12, folate, ammonia level) have been within normal limits. Pt's confusion and AMS that he initially presented with have also improved significantly/resolved. Pt remains on iron supplementation and Hb has been uptrending. Given that pt presented to [facility] prior to current hospitalization with AMS, r/o dx include Wernicke's encephalopathy, however he was treated with IV thiamine during hospitalization at [facility] (and oral supplementation just recently d/c). Will continue to monitor. Of note MMSE (1/10) at 27 and2/27 was 29. Repeat CBC and CMP wnl 1/27/22.  9. Dispo - to rehab in [place], pending placement       Pt remains in good behavioral control and is cooperative and pleasant. Psychosis also remains improved though pt with ongoing persecutory and referential delusions (though without full delusional conviction, more in the realm of paranoid ideation). Pt remains depressed with passive conditional SI when thinking about relapsing though appearing more hopeful when thinking about family. Most likely 2/2 MDD with psychosis. Team continues to provide reassurance and reality testing. Neg. Romberg test regarding dizziness (1/19). Cigarette cravings starting back 1/19. Continues to struggle to be hopeful vs hopeless, but happy to get ECT and overall is doing better.    Plan:  1. Pt s/p Aristada loading on 12/30/21, monthly Aristada 882mg 1/27/22. Next dose due 2/27/22.  3. Continue Effexor XR to 225 mg daily  4. C/w Atarax 50mg BID for anxiety  5. Nicorette gum for cigarette cravings (1/20/22)  6. Melatonin 3mg QHs for insomnia  7. ECT MW (started 1/28/22), treatment #2 today  -CBC, CMP, COVID-19 PCR, and EKG wnl  -Dental consult- normal mouth guard  8. With regards to vague physical complaints - pt does not objectively appear unwell or unsteady. He is eating and drinking, and vitals and recent labs (including CBC, CMP, B12, folate, ammonia level) have been within normal limits. Pt's confusion and AMS that he initially presented with have also improved significantly/resolved. Pt remains on iron supplementation and Hb has been uptrending. Given that pt presented to [place] prior to current hospitalization with AMS, r/o dx include Wernicke's encephalopathy, however he was treated with IV thiamine during hospitalization at [place] (and oral supplementation just recently d/c). Will continue to monitor. Of note MMSE (1/10) at 27 and2/27 was 29. Repeat CBC and CMP wnl 1/27/22.  9. Dispo - to rehab in [place], pending placement

## 2022-01-31 NOTE — BH INPATIENT PSYCHIATRY PROGRESS NOTE - CASE SUMMARY
Pt remains depressed and with ongoing psychotic paranoia and referential thoughts, though with slight improvement (better able to reality test, engaging more in unit community and attending groups). Still with passive death wish and conditional SI regarding fear of relapse on substances and homelessness. Remains in good behavioral control. Still at times with somatic complaints that appear more-so related to anxiety. ECT #2 scheduled for today. C/w Effexor and monthly Aristada

## 2022-01-31 NOTE — BH INPATIENT PSYCHIATRY PROGRESS NOTE - NSBHCHARTREVIEWVS_PSY_A_CORE FT
Vital Signs Last 24 Hrs  T(C): 36.1 (01-31-22 @ 11:18), Max: 36.9 (01-31-22 @ 10:41)  T(F): 97 (01-31-22 @ 11:18), Max: 98.4 (01-31-22 @ 10:41)  HR: 100 (01-31-22 @ 11:20) (77 - 100)  BP: 168/88 (01-31-22 @ 11:20) (121/80 - 168/88)  BP(mean): --  RR: 17 (01-31-22 @ 11:20) (16 - 17)  SpO2: 98% (01-31-22 @ 11:20) (98% - 99%)    Orthostatic VS  01-30-22 @ 19:25  Lying BP: --/-- HR: --  Sitting BP: 132/77 HR: 81  Standing BP: 123/85 HR: 92  Site: --  Mode: --  Orthostatic VS  01-29-22 @ 18:58  Lying BP: --/-- HR: --  Sitting BP: 141/85 HR: 87  Standing BP: --/-- HR: --  Site: --  Mode: --   Vital Signs Last 24 Hrs  T(C): 36.4 (02-01-22 @ 07:57), Max: 36.4 (02-01-22 @ 07:57)  T(F): 97.5 (02-01-22 @ 07:57), Max: 97.5 (02-01-22 @ 07:57)  HR: 79 (01-31-22 @ 13:04) (79 - 79)  BP: 150/90 (01-31-22 @ 13:04) (150/90 - 150/90)  BP(mean): --  RR: --  SpO2: --    Orthostatic VS  02-01-22 @ 07:57  Lying BP: --/-- HR: --  Sitting BP: 159/95 HR: 63  Standing BP: --/-- HR: --  Site: --  Mode: --  Orthostatic VS  01-31-22 @ 19:36  Lying BP: --/-- HR: --  Sitting BP: 143/79 HR: 90  Standing BP: --/-- HR: --  Site: upper left arm  Mode: electronic  Orthostatic VS  01-30-22 @ 19:25  Lying BP: --/-- HR: --  Sitting BP: 132/77 HR: 81  Standing BP: 123/85 HR: 92  Site: --  Mode: --

## 2022-01-31 NOTE — BH INPATIENT PSYCHIATRY PROGRESS NOTE - NSBHMETABOLIC_PSY_ALL_CORE_FT
BMI:   HbA1c: A1C with Estimated Average Glucose Result: 5.4 % (12-21-21 @ 09:46)    Glucose: POCT Blood Glucose.: 95 mg/dL (11-30-21 @ 05:53)    BP: 168/88 (01-31-22 @ 11:20) (121/80 - 168/88)  Lipid Panel: Date/Time: 12-21-21 @ 09:46  Cholesterol, Serum: 148  Direct LDL: --  HDL Cholesterol, Serum: 40  Total Cholesterol/HDL Ration Measurement: --  Triglycerides, Serum: 55   BMI:   HbA1c: A1C with Estimated Average Glucose Result: 5.4 % (12-21-21 @ 09:46)    Glucose: POCT Blood Glucose.: 95 mg/dL (11-30-21 @ 05:53)    BP: 150/90 (01-31-22 @ 13:04) (121/80 - 168/88)  Lipid Panel: Date/Time: 12-21-21 @ 09:46  Cholesterol, Serum: 148  Direct LDL: --  HDL Cholesterol, Serum: 40  Total Cholesterol/HDL Ration Measurement: --  Triglycerides, Serum: 55

## 2022-02-01 PROCEDURE — 99232 SBSQ HOSP IP/OBS MODERATE 35: CPT

## 2022-02-01 RX ADMIN — Medication 50 MILLIGRAM(S): at 20:50

## 2022-02-01 RX ADMIN — Medication 325 MILLIGRAM(S): at 08:08

## 2022-02-01 RX ADMIN — Medication 50 MILLIGRAM(S): at 08:08

## 2022-02-01 RX ADMIN — Medication 225 MILLIGRAM(S): at 08:08

## 2022-02-01 RX ADMIN — Medication 3 MILLIGRAM(S): at 20:51

## 2022-02-01 NOTE — BH INPATIENT PSYCHIATRY PROGRESS NOTE - NSBHCHARTREVIEWVS_PSY_A_CORE FT
Vital Signs Last 24 Hrs  T(C): 36.4 (02-01-22 @ 07:57), Max: 36.5 (01-31-22 @ 12:00)  T(F): 97.5 (02-01-22 @ 07:57), Max: 97.7 (01-31-22 @ 12:00)  HR: 79 (01-31-22 @ 13:04) (79 - 103)  BP: 150/90 (01-31-22 @ 13:04) (121/80 - 168/88)  BP(mean): --  RR: 20 (01-31-22 @ 12:00) (16 - 20)  SpO2: 98% (01-31-22 @ 12:00) (95% - 99%)    Orthostatic VS  02-01-22 @ 07:57  Lying BP: --/-- HR: --  Sitting BP: 159/95 HR: 63  Standing BP: --/-- HR: --  Site: --  Mode: --  Orthostatic VS  01-31-22 @ 19:36  Lying BP: --/-- HR: --  Sitting BP: 143/79 HR: 90  Standing BP: --/-- HR: --  Site: upper left arm  Mode: electronic  Orthostatic VS  01-30-22 @ 19:25  Lying BP: --/-- HR: --  Sitting BP: 132/77 HR: 81  Standing BP: 123/85 HR: 92  Site: --  Mode: --   Vital Signs Last 24 Hrs  T(C): 36.2 (02-02-22 @ 08:15), Max: 37.2 (02-01-22 @ 19:29)  T(F): 97.1 (02-02-22 @ 08:15), Max: 99 (02-01-22 @ 19:29)  HR: --  BP: --  BP(mean): --  RR: --  SpO2: --    Orthostatic VS  02-02-22 @ 08:15  Lying BP: --/-- HR: --  Sitting BP: 128/94 HR: 84  Standing BP: --/-- HR: --  Site: --  Mode: --  Orthostatic VS  02-01-22 @ 19:29  Lying BP: --/-- HR: --  Sitting BP: 128/79 HR: 89  Standing BP: --/-- HR: --  Site: --  Mode: --  Orthostatic VS  02-01-22 @ 07:57  Lying BP: --/-- HR: --  Sitting BP: 159/95 HR: 63  Standing BP: --/-- HR: --  Site: --  Mode: --  Orthostatic VS  01-31-22 @ 19:36  Lying BP: --/-- HR: --  Sitting BP: 143/79 HR: 90  Standing BP: --/-- HR: --  Site: upper left arm  Mode: electronic

## 2022-02-01 NOTE — BH INPATIENT PSYCHIATRY PROGRESS NOTE - CASE SUMMARY
Pt remains depressed and paranoid though reports some improvement in anxiety, states he is feeling calmer. Still with belief that others are talking about him outside his room and targeting him. Better able to reality test and remains more interactive in unit milieu (going to some groups). Still with passive death wish and conditional SI regarding fear of relapsing and ending up back on the street. ECT #3 planned for tomorrow. C/w monthly Aristada and Effexor.

## 2022-02-01 NOTE — BH INPATIENT PSYCHIATRY PROGRESS NOTE - NSBHASSESSSUMMFT_PSY_ALL_CORE
Pt remains in good behavioral control and is cooperative and pleasant. Psychosis also remains improved though pt with ongoing persecutory and referential delusions (though without full delusional conviction, more in the realm of paranoid ideation). Pt remains depressed with passive conditional SI when thinking about relapsing though appearing more hopeful when thinking about family. Most likely 2/2 MDD with psychosis. Team continues to provide reassurance and reality testing. Neg. Romberg test regarding dizziness (1/19). Cigarette cravings starting back 1/19. Today feels overall less paranoid and hopeful for recovery.    Plan:  1. Pt s/p Aristada loading on 12/30/21, monthly Aristada 882mg 1/27/22. Next dose due 2/27/22.  3. Continue Effexor XR to 225 mg daily  4. C/w Atarax 50mg BID for anxiety  5. Nicorette gum for cigarette cravings (1/20/22)  6. Melatonin 3mg QHs for insomnia  7. ECT MWF (started 1/28/22)  -CBC, CMP, COVID-19 PCR, and EKG wnl  -Dental consult- normal mouth guard  8. With regards to vague physical complaints - pt does not objectively appear unwell or unsteady. He is eating and drinking, and vitals and recent labs (including CBC, CMP, B12, folate, ammonia level) have been within normal limits. Pt's confusion and AMS that he initially presented with have also improved significantly/resolved. Pt remains on iron supplementation and Hb has been uptrending. Given that pt presented to [place] [facility] prior to current hospitalization with AMS, r/o dx include Wernicke's encephalopathy, however he was treated with IV thiamine during hospitalization at [place] [facility] (and oral supplementation just recently d/c). Will continue to monitor. Of note MMSE (1/10) at 27 and2/27 was 29. Repeat CBC and CMP wnl 1/27/22.  9. Dispo - to rehab in [place], pending placement

## 2022-02-01 NOTE — BH INPATIENT PSYCHIATRY PROGRESS NOTE - NSBHMETABOLIC_PSY_ALL_CORE_FT
BMI:   HbA1c: A1C with Estimated Average Glucose Result: 5.4 % (12-21-21 @ 09:46)    Glucose: POCT Blood Glucose.: 95 mg/dL (11-30-21 @ 05:53)    BP: 150/90 (01-31-22 @ 13:04) (121/80 - 168/88)  Lipid Panel: Date/Time: 12-21-21 @ 09:46  Cholesterol, Serum: 148  Direct LDL: --  HDL Cholesterol, Serum: 40  Total Cholesterol/HDL Ration Measurement: --  Triglycerides, Serum: 55

## 2022-02-01 NOTE — BH INPATIENT PSYCHIATRY PROGRESS NOTE - NSBHFUPINTERVALHXFT_PSY_A_CORE
No acute overnight events. Patient compliant with medications and in good behavioral control. No PRN meds. Pt. saying that he is doing okay today, feeling more hopeful than previously. Said that his mouth hurt since ECT this morning but will take Tylenol. Says that he is conditionally suicidal over the thought of feeling the way he is feeling right now forever, but is currently hopeful that things will get better. Wants to continue with ECT. Still paranoid but feels slightly less paranoid today. Appetite and sleep are good. No HI or AVH.

## 2022-02-01 NOTE — BH INPATIENT PSYCHIATRY PROGRESS NOTE - MSE UNSTRUCTURED FT
Patient is a 52yo man who appears his stated age. He is alert and oriented to person, place, and time with fair grooming and hygiene. Patient is cooperative and calm. Speech is normal volume and rate. Patient describes his mood as "hopeful" and affect is constricted, depressed, slightly hopeful and is congruent with mood. Patient is linear and goal-directed. Thought content endorses passive death wish but denies active SI (does endorse conditional SI about living the way he feels forever). Maintains paranoid ideations about being talked about while he's inside his room. A&Ox3. Memory intact. Insight and judgment are fair. Behavioral control is intact.     MMSE completed 1/27/22 - 29/30

## 2022-02-02 PROCEDURE — 90870 ELECTROCONVULSIVE THERAPY: CPT

## 2022-02-02 PROCEDURE — 99232 SBSQ HOSP IP/OBS MODERATE 35: CPT | Mod: 25

## 2022-02-02 RX ORDER — MIDAZOLAM HYDROCHLORIDE 1 MG/ML
2 INJECTION, SOLUTION INTRAMUSCULAR; INTRAVENOUS ONCE
Refills: 0 | Status: DISCONTINUED | OUTPATIENT
Start: 2022-02-02 | End: 2022-02-02

## 2022-02-02 RX ADMIN — Medication 650 MILLIGRAM(S): at 12:51

## 2022-02-02 RX ADMIN — Medication 50 MILLIGRAM(S): at 07:54

## 2022-02-02 RX ADMIN — MIDAZOLAM HYDROCHLORIDE 2 MILLIGRAM(S): 1 INJECTION, SOLUTION INTRAMUSCULAR; INTRAVENOUS at 15:18

## 2022-02-02 RX ADMIN — Medication 3 MILLIGRAM(S): at 20:45

## 2022-02-02 RX ADMIN — Medication 50 MILLIGRAM(S): at 20:45

## 2022-02-02 RX ADMIN — Medication 325 MILLIGRAM(S): at 07:54

## 2022-02-02 RX ADMIN — Medication 225 MILLIGRAM(S): at 07:53

## 2022-02-02 NOTE — BH INPATIENT PSYCHIATRY PROGRESS NOTE - NSBHCHARTREVIEWVS_PSY_A_CORE FT
Vital Signs Last 24 Hrs  T(C): 36.2 (02-02-22 @ 08:15), Max: 37.2 (02-01-22 @ 19:29)  T(F): 97.1 (02-02-22 @ 08:15), Max: 99 (02-01-22 @ 19:29)  HR: --  BP: --  BP(mean): --  RR: --  SpO2: --    Orthostatic VS  02-02-22 @ 08:15  Lying BP: --/-- HR: --  Sitting BP: 128/94 HR: 84  Standing BP: --/-- HR: --  Site: --  Mode: --  Orthostatic VS  02-01-22 @ 19:29  Lying BP: --/-- HR: --  Sitting BP: 128/79 HR: 89  Standing BP: --/-- HR: --  Site: --  Mode: --  Orthostatic VS  02-01-22 @ 07:57  Lying BP: --/-- HR: --  Sitting BP: 159/95 HR: 63  Standing BP: --/-- HR: --  Site: --  Mode: --  Orthostatic VS  01-31-22 @ 19:36  Lying BP: --/-- HR: --  Sitting BP: 143/79 HR: 90  Standing BP: --/-- HR: --  Site: upper left arm  Mode: electronic   Vital Signs Last 24 Hrs  T(C): 36.3 (02-02-22 @ 16:00), Max: 37.2 (02-01-22 @ 19:29)  T(F): 97.4 (02-02-22 @ 16:00), Max: 99 (02-01-22 @ 19:29)  HR: 82 (02-02-22 @ 16:00) (82 - 88)  BP: 142/84 (02-02-22 @ 16:00) (142/84 - 169/93)  BP(mean): --  RR: 16 (02-02-22 @ 16:00) (15 - 20)  SpO2: 97% (02-02-22 @ 16:00) (93% - 100%)    Orthostatic VS  02-02-22 @ 16:15  Lying BP: --/-- HR: --  Sitting BP: 152/82 HR: 81  Standing BP: --/-- HR: --  Site: --  Mode: --  Orthostatic VS  02-02-22 @ 08:15  Lying BP: --/-- HR: --  Sitting BP: 128/94 HR: 84  Standing BP: --/-- HR: --  Site: --  Mode: --  Orthostatic VS  02-01-22 @ 19:29  Lying BP: --/-- HR: --  Sitting BP: 128/79 HR: 89  Standing BP: --/-- HR: --  Site: --  Mode: --  Orthostatic VS  02-01-22 @ 07:57  Lying BP: --/-- HR: --  Sitting BP: 159/95 HR: 63  Standing BP: --/-- HR: --  Site: --  Mode: --  Orthostatic VS  01-31-22 @ 19:36  Lying BP: --/-- HR: --  Sitting BP: 143/79 HR: 90  Standing BP: --/-- HR: --  Site: upper left arm  Mode: electronic

## 2022-02-02 NOTE — BH INPATIENT PSYCHIATRY PROGRESS NOTE - CASE SUMMARY
Difficult to engage pt in meaningful interview today as he is preoccupied with feeling hungry in context of not being able to eat pre-ECT treatment. Continues to report slight improvement in mood though irritable this morning. Still with passive and conditional SI related to uncertainty in his future. Still with vague paranoia, though able to reality test. Noted by staff that mood tends to fluctuate. ECT #3 today. C/w monthly Aristada and Effexor.

## 2022-02-02 NOTE — BH INPATIENT PSYCHIATRY PROGRESS NOTE - NSBHFUPINTERVALHXFT_PSY_A_CORE
No acute overnight events. Patient compliant with medications and in good behavioral control. No PRN meds. Says that he is hungry and that the worst part of ECT is fasting prior to the procedure. Says that if he could remove his hunger from his feelings, his mood would be improved compared to admission. Says that he continues to be paranoid that people are speaking poorly about him and that people are out to get him, but these feelings have lessened. Continues to be conditionally suicidal over going back to previous life, but currently denies passive death wish or SI. Appetite and sleep are good. No HI or AVH.

## 2022-02-02 NOTE — BH INPATIENT PSYCHIATRY PROGRESS NOTE - MSE UNSTRUCTURED FT
Patient is a 50yo man who appears his stated age. He is alert and oriented to person, place, and time with fair grooming and hygiene. Patient is cooperative and calm. Speech is normal volume and rate. Patient describes his mood as "good" and affect is constricted, depressed, slightly hopeful and is congruent with mood. Patient is linear and goal-directed. Thought content endorses passive death wish but denies active SI (does endorse conditional SI about going back to previous life). Paranoia decreasing. A&Ox3. Memory intact. Insight and judgment are fair. Behavioral control is intact.     MMSE completed 1/27/22 - 29/30

## 2022-02-02 NOTE — BH INPATIENT PSYCHIATRY PROGRESS NOTE - NSBHASSESSSUMMFT_PSY_ALL_CORE
Pt remains in good behavioral control and is cooperative and pleasant. Started on ECT on 1/28/22 for MDD with psychosis with some improvement, will continue to monitor mood and paranoia.     Plan:  1. Pt s/p Aristada loading on 12/30/21, monthly Aristada 882mg 1/27/22. Next dose due 2/27/22.  3. Continue Effexor  mg daily  4. C/w Atarax 50mg BID for anxiety  5. Nicorette gum for cigarette cravings (1/20/22)  6. Melatonin 3mg QHs for insomnia  7. ECT MWF (started 1/28/22)  -CBC, CMP, COVID-19 PCR, and EKG wnl  -Dental consult- normal mouth guard  8. With regards to vague physical complaints - pt does not objectively appear unwell or unsteady. He is eating and drinking, and vitals and recent labs (including CBC, CMP, B12, folate, ammonia level) have been within normal limits. Pt's confusion and AMS that he initially presented with have also improved significantly/resolved. Pt remains on iron supplementation and Hb has been uptrending. Given that pt presented to [place] prior to current hospitalization with AMS, r/o dx include Wernicke's encephalopathy, however he was treated with IV thiamine during hospitalization at [place] (and oral supplementation just recently d/c). Will continue to monitor. Of note MMSE (1/10) at 27 and2/27 was 29. Repeat CBC and CMP wnl 1/27/22.  9. Dispo - to rehab in [place], pending placement

## 2022-02-02 NOTE — BH INPATIENT PSYCHIATRY PROGRESS NOTE - NSBHMETABOLIC_PSY_ALL_CORE_FT
BMI:   HbA1c: A1C with Estimated Average Glucose Result: 5.4 % (12-21-21 @ 09:46)    Glucose: POCT Blood Glucose.: 95 mg/dL (11-30-21 @ 05:53)    BP: 150/90 (01-31-22 @ 13:04) (121/80 - 168/88)  Lipid Panel: Date/Time: 12-21-21 @ 09:46  Cholesterol, Serum: 148  Direct LDL: --  HDL Cholesterol, Serum: 40  Total Cholesterol/HDL Ration Measurement: --  Triglycerides, Serum: 55   BMI:   HbA1c: A1C with Estimated Average Glucose Result: 5.4 % (12-21-21 @ 09:46)    Glucose: POCT Blood Glucose.: 95 mg/dL (11-30-21 @ 05:53)    BP: 142/84 (02-02-22 @ 16:00) (121/80 - 169/93)  Lipid Panel: Date/Time: 12-21-21 @ 09:46  Cholesterol, Serum: 148  Direct LDL: --  HDL Cholesterol, Serum: 40  Total Cholesterol/HDL Ration Measurement: --  Triglycerides, Serum: 55

## 2022-02-03 LAB — SARS-COV-2 RNA SPEC QL NAA+PROBE: SIGNIFICANT CHANGE UP

## 2022-02-03 PROCEDURE — 99232 SBSQ HOSP IP/OBS MODERATE 35: CPT

## 2022-02-03 PROCEDURE — 90853 GROUP PSYCHOTHERAPY: CPT

## 2022-02-03 RX ADMIN — Medication 3 MILLIGRAM(S): at 20:35

## 2022-02-03 RX ADMIN — Medication 50 MILLIGRAM(S): at 20:35

## 2022-02-03 RX ADMIN — Medication 325 MILLIGRAM(S): at 08:23

## 2022-02-03 RX ADMIN — Medication 225 MILLIGRAM(S): at 08:23

## 2022-02-03 RX ADMIN — Medication 50 MILLIGRAM(S): at 08:23

## 2022-02-03 NOTE — BH PSYCHOLOGY - GROUP THERAPY NOTE - NSPSYCHOLGRPGENPT_PSY_A_CORE FT
Patient attended the cognitive behavior therapy group session. Group session focused on the topic of changing patterns of limited thinking.  Discussion revolved around the eight different patterns as well as identifying the group's recognition of their own patterns of limited thinking.  Group expectations and guidelines, as well as confidentiality and its limitations, were addressed. Principles of cognitive behavior therapy related to today's group topic were reviewed and discussed.  Group members illustrated understanding of these concepts by giving personal examples based on their current experiences. Discussions stemmed from the group topics to the causal connection between thoughts, feelings, and behaviors.
Patient attended the cognitive behavior therapy group session. Group focused on topics including understanding the connection between cognitive processes and learning to tolerate moments of distress.  Coping methods such as creating a list of pros and cons as well as the techniques of distraction and self-soothing were discussed.  Group expectations and guidelines as well as confidentiality and its limitations were addressed. Principles of cognitive behavior therapy related to today's group topic were reviewed.  Group members illustrated understanding of these concepts by giving personal examples based on their current experiences. Discussions stemmed from the group topics to the causal connection between thoughts, feelings, and behaviors.
Patient attended the cognitive behavior therapy group session. Group session focused on the topic of anxiety.  Discussion revolved around the definitions of anxiety, fear, and panic attacks as well as the exploration of strategies to reduce its negative effects.  Group expectations and guidelines, as well as confidentiality and its limitations, were addressed. Principles of cognitive behavior therapy related to today's group topic were reviewed and discussed. Group members illustrated understanding of these concepts by giving personal examples based on their current experiences. Discussions stemmed from the group topics to the causal connection between thoughts, feelings, and behaviors.

## 2022-02-03 NOTE — BH PSYCHOLOGY - GROUP THERAPY NOTE - NSBHPSYCHOLPARTICIPCOMMENT_PSY_A_CORE FT
Patient appeared attentive and interested in the group discussion.  Although the patient did not contribute spontaneously during the group session, patient was able to read from the worksheet when prompted. Patient was able to engage with the  and other members appropriately. With the assistance of the magnifying glass provided to him, patient was able to read several times during the session.
Patient appeared attentive and interested in the group discussion.  Although the patient did not contribute spontaneously or volunteer to read during the group session, patient was able to follow along with the group discussion. Patient was able to engage with the  appropriately.
Patient appeared attentive and interested in the group discussion.  Although the patient did not contribute spontaneously or volunteer to read during the group session, patient was able to follow along with the group discussion. Patient was able to engage with the  appropriately. Patient claimed he did not have reading glasses but appeared to understand the materials presented.

## 2022-02-03 NOTE — BH PSYCHOLOGY - GROUP THERAPY NOTE - NSBHPSYCHOLRESPONSE_PSY_A_CORE
Symptoms reduced/Coping skills acquired/Accepted support

## 2022-02-03 NOTE — BH INPATIENT PSYCHIATRY PROGRESS NOTE - NSBHCHARTREVIEWVS_PSY_A_CORE FT
Vital Signs Last 24 Hrs  T(C): 36.6 (02-03-22 @ 08:31), Max: 36.6 (02-02-22 @ 14:53)  T(F): 97.8 (02-03-22 @ 08:31), Max: 97.8 (02-02-22 @ 14:53)  HR: 81 (02-03-22 @ 08:31) (81 - 88)  BP: 127/78 (02-03-22 @ 08:31) (127/78 - 169/93)  BP(mean): --  RR: 16 (02-02-22 @ 16:00) (15 - 20)  SpO2: 97% (02-02-22 @ 16:00) (93% - 100%)    Orthostatic VS  02-02-22 @ 16:15  Lying BP: --/-- HR: --  Sitting BP: 152/82 HR: 81  Standing BP: --/-- HR: --  Site: --  Mode: --  Orthostatic VS  02-02-22 @ 08:15  Lying BP: --/-- HR: --  Sitting BP: 128/94 HR: 84  Standing BP: --/-- HR: --  Site: --  Mode: --  Orthostatic VS  02-01-22 @ 19:29  Lying BP: --/-- HR: --  Sitting BP: 128/79 HR: 89  Standing BP: --/-- HR: --  Site: --  Mode: --   Vital Signs Last 24 Hrs  T(C): 36.6 (02-04-22 @ 12:30), Max: 37.1 (02-03-22 @ 20:10)  T(F): 97.9 (02-04-22 @ 12:30), Max: 98.7 (02-03-22 @ 20:10)  HR: 95 (02-04-22 @ 12:30) (95 - 106)  BP: 143/83 (02-04-22 @ 12:30) (133/80 - 145/93)  BP(mean): --  RR: 16 (02-04-22 @ 12:30) (16 - 19)  SpO2: 96% (02-04-22 @ 12:30) (92% - 96%)    Orthostatic VS  02-04-22 @ 07:56  Lying BP: --/-- HR: --  Sitting BP: 122/80 HR: 60  Standing BP: --/-- HR: --  Site: --  Mode: --  Orthostatic VS  02-02-22 @ 16:15  Lying BP: --/-- HR: --  Sitting BP: 152/82 HR: 81  Standing BP: --/-- HR: --  Site: --  Mode: --

## 2022-02-03 NOTE — BH INPATIENT PSYCHIATRY PROGRESS NOTE - CASE SUMMARY
Pt remains depressed with passive SI and ongoing paranoia, at times irritable as well, though with slight improvement in mood and anxiety and still making attempts to engage in groups on the unit (also planning to attend virtual AA meeting this evening). Still with somatic complaints that are most likely related to anxiety. ECT #4 planned for tomorrow. C/w Effexor and monthly Aristada.

## 2022-02-03 NOTE — BH INPATIENT PSYCHIATRY PROGRESS NOTE - NSBHMETABOLIC_PSY_ALL_CORE_FT
Clothing BMI:   HbA1c: A1C with Estimated Average Glucose Result: 5.4 % (12-21-21 @ 09:46)    Glucose: POCT Blood Glucose.: 95 mg/dL (11-30-21 @ 05:53)    BP: 127/78 (02-03-22 @ 08:31) (121/80 - 169/93)  Lipid Panel: Date/Time: 12-21-21 @ 09:46  Cholesterol, Serum: 148  Direct LDL: --  HDL Cholesterol, Serum: 40  Total Cholesterol/HDL Ration Measurement: --  Triglycerides, Serum: 55   BMI:   HbA1c: A1C with Estimated Average Glucose Result: 5.4 % (12-21-21 @ 09:46)    Glucose: POCT Blood Glucose.: 95 mg/dL (11-30-21 @ 05:53)    BP: 143/83 (02-04-22 @ 12:30) (127/78 - 169/93)  Lipid Panel: Date/Time: 12-21-21 @ 09:46  Cholesterol, Serum: 148  Direct LDL: --  HDL Cholesterol, Serum: 40  Total Cholesterol/HDL Ration Measurement: --  Triglycerides, Serum: 55

## 2022-02-03 NOTE — BH INPATIENT PSYCHIATRY PROGRESS NOTE - NSBHFUPINTERVALHXFT_PSY_A_CORE
No acute overnight events. Patient compliant with medications and in good behavioral control. No PRN meds needed for agitation. Today says he's doing poorly d/t headache and stomach pain. His mood is overall "low." Appetite is good but sleep remains poor. Overall says his paranoia remains constant, worrying that someone on the unit "want to kill him." Will continue with ECT.  Continues to be conditionally suicidal over going back to previous life and endorses passive death wish but denies SI with intent or plan. No HI or AVH.

## 2022-02-03 NOTE — BH PSYCHOLOGY - GROUP THERAPY NOTE - NSPSYCHOLGRPGENPROB_PSY_A_CORE
academic/vocational/social dysfunction/anxiety/depression/other...

## 2022-02-03 NOTE — BH INPATIENT PSYCHIATRY PROGRESS NOTE - MSE UNSTRUCTURED FT
Patient is a 50yo man who appears his stated age. He is alert and oriented to person, place, and time with fair grooming and hygiene. Patient is cooperative and calm. Speech is normal volume and rate. Patient describes his mood as "low" and affect is constricted, depressed, and irritable. Patient is linear and goal-directed. Thought content endorses passive death wish but denies active SI (does endorse conditional SI about going back to previous life). Paranoia worsened today. A&Ox3. Memory intact. Insight and judgment are fair. Behavioral control is intact.     MMSE completed 1/27/22 - 29/30

## 2022-02-03 NOTE — BH PSYCHOLOGY - GROUP THERAPY NOTE - NSPSYCHOLGRPGENGOAL_PSY_A_CORE
assessment/decrease symptoms/improve level of independent functioning/improve social/vocational/coping skills/psychoeducation/treatment compliance

## 2022-02-03 NOTE — BH PSYCHOLOGY - GROUP THERAPY NOTE - NSPSYCHOLGRPGENINT_PSY_A_CORE
cognitive/behavioral therapy/problem solving techniques discussed/stress management/supported coping skills/supportive therapy/treatment compliance encouraged/social skills training

## 2022-02-03 NOTE — BH PSYCHOLOGY - GROUP THERAPY NOTE - NSPSYCHOLGRPGENPROB_PSY_A_CORE FT
Severe recurrent major depressive disorder with psychotic features   F33.3  Polysubstance abuse   F19.10  Severe alcohol use disorder   F10.20  R/O Schizoaffective disorder, depressive type   F25.1  Identify and utilize 2 coping skills that meet their needs  

## 2022-02-04 PROCEDURE — 99232 SBSQ HOSP IP/OBS MODERATE 35: CPT

## 2022-02-04 RX ORDER — MIDAZOLAM HYDROCHLORIDE 1 MG/ML
2 INJECTION, SOLUTION INTRAMUSCULAR; INTRAVENOUS ONCE
Refills: 0 | Status: DISCONTINUED | OUTPATIENT
Start: 2022-02-04 | End: 2022-02-04

## 2022-02-04 RX ADMIN — Medication 50 MILLIGRAM(S): at 08:45

## 2022-02-04 RX ADMIN — Medication 3 MILLIGRAM(S): at 21:23

## 2022-02-04 RX ADMIN — MIDAZOLAM HYDROCHLORIDE 2 MILLIGRAM(S): 1 INJECTION, SOLUTION INTRAMUSCULAR; INTRAVENOUS at 11:50

## 2022-02-04 RX ADMIN — Medication 50 MILLIGRAM(S): at 21:23

## 2022-02-04 RX ADMIN — Medication 225 MILLIGRAM(S): at 08:45

## 2022-02-04 RX ADMIN — Medication 325 MILLIGRAM(S): at 08:45

## 2022-02-04 NOTE — BH INPATIENT PSYCHIATRY PROGRESS NOTE - CASE SUMMARY
Pt with very little improvement in mood and paranoia; remains depressed (and often irritable) with passive SI and ongoing paranoia. Also hopeless and helpless (at times also help-rejecting and resistant to staff's repeated attempts to reassure pt). Still with somatic complaints that are most likely related to anxiety. Upset about need to remain NPO prior to ECT treatments in a regressed manner. ECT #4 planned for today. C/w Effexor and monthly Aristada.

## 2022-02-04 NOTE — BH INPATIENT PSYCHIATRY PROGRESS NOTE - NSBHCHARTREVIEWVS_PSY_A_CORE FT
Vital Signs Last 24 Hrs  T(C): 36.1 (02-04-22 @ 07:56), Max: 37.1 (02-03-22 @ 20:10)  T(F): 97 (02-04-22 @ 07:56), Max: 98.7 (02-03-22 @ 20:10)  HR: --  BP: --  BP(mean): --  RR: --  SpO2: --    Orthostatic VS  02-04-22 @ 07:56  Lying BP: --/-- HR: --  Sitting BP: 122/80 HR: 60  Standing BP: --/-- HR: --  Site: --  Mode: --  Orthostatic VS  02-02-22 @ 16:15  Lying BP: --/-- HR: --  Sitting BP: 152/82 HR: 81  Standing BP: --/-- HR: --  Site: --  Mode: --   Vital Signs Last 24 Hrs  T(C): 36.6 (02-04-22 @ 12:30), Max: 37.1 (02-03-22 @ 20:10)  T(F): 97.9 (02-04-22 @ 12:30), Max: 98.7 (02-03-22 @ 20:10)  HR: 95 (02-04-22 @ 12:30) (95 - 106)  BP: 143/83 (02-04-22 @ 12:30) (133/80 - 145/93)  BP(mean): --  RR: 16 (02-04-22 @ 12:30) (16 - 19)  SpO2: 96% (02-04-22 @ 12:30) (92% - 96%)    Orthostatic VS  02-04-22 @ 07:56  Lying BP: --/-- HR: --  Sitting BP: 122/80 HR: 60  Standing BP: --/-- HR: --  Site: --  Mode: --  Orthostatic VS  02-02-22 @ 16:15  Lying BP: --/-- HR: --  Sitting BP: 152/82 HR: 81  Standing BP: --/-- HR: --  Site: --  Mode: --

## 2022-02-04 NOTE — BH INPATIENT PSYCHIATRY PROGRESS NOTE - MSE UNSTRUCTURED FT
Patient is a 50yo man who appears his stated age. He is alert and oriented to person, place, and time with fair grooming and hygiene. Patient is cooperative and calm but irritable. Makes fair eye contact. Speech is normal volume and rate. Patient describes his mood as "low" and affect is constricted, depressed, and irritable. Patient is linear and goal-directed. Thought content endorses passive death wish but denies active SI (does endorse conditional SI about going back to previous life). Continues paranoia. A&Ox3. Memory intact. Insight and judgment are fair. Behavioral control is intact.     MMSE completed 1/27/22 - 29/30

## 2022-02-04 NOTE — BH INPATIENT PSYCHIATRY PROGRESS NOTE - NSBHMETABOLIC_PSY_ALL_CORE_FT
BMI:   HbA1c: A1C with Estimated Average Glucose Result: 5.4 % (12-21-21 @ 09:46)    Glucose: POCT Blood Glucose.: 95 mg/dL (11-30-21 @ 05:53)    BP: 127/78 (02-03-22 @ 08:31) (127/78 - 169/93)  Lipid Panel: Date/Time: 12-21-21 @ 09:46  Cholesterol, Serum: 148  Direct LDL: --  HDL Cholesterol, Serum: 40  Total Cholesterol/HDL Ration Measurement: --  Triglycerides, Serum: 55   BMI:   HbA1c: A1C with Estimated Average Glucose Result: 5.4 % (12-21-21 @ 09:46)    Glucose: POCT Blood Glucose.: 95 mg/dL (11-30-21 @ 05:53)    BP: 143/83 (02-04-22 @ 12:30) (127/78 - 169/93)  Lipid Panel: Date/Time: 12-21-21 @ 09:46  Cholesterol, Serum: 148  Direct LDL: --  HDL Cholesterol, Serum: 40  Total Cholesterol/HDL Ration Measurement: --  Triglycerides, Serum: 55

## 2022-02-04 NOTE — BH INPATIENT PSYCHIATRY PROGRESS NOTE - NSBHFUPINTERVALHXFT_PSY_A_CORE
No acute overnight events. Patient compliant with medications and in good behavioral control. No PRN meds needed for agitation. Per nursing, pt. is irritable d/t being hungry from being NPO for ECT but otherwise in good behavioral control. Pt seen in bed in his room. Says that he has a "low mood." Says that he enjoyed his AA meeting yesterday. Pt says he is hungry and does not enjoy being NPO before ECT. Now is irritable. Says that he is "annoyed" with the team for asking him to go to groups. Says that he is still paranoid. Continues to have conditional SI about being in current state. Denies current SI with intent or plan. Denies HI or AVH.

## 2022-02-04 NOTE — BH INPATIENT PSYCHIATRY PROGRESS NOTE - NSBHASSESSSUMMFT_PSY_ALL_CORE
Pt remains in good behavioral control and is cooperative although more irritable. Started on ECT on 1/28/22 for MDD with psychosis with some improvement, will continue to monitor mood and paranoia. Continues to be hopeful for ECT.    Plan:  1. Pt s/p Aristada loading on 12/30/21, monthly Aristada 882mg 1/27/22. Next dose due 2/27/22.  3. C/w Effexor  mg daily  4. C/w Atarax 50mg BID for anxiety  5. Nicorette gum for cigarette cravings (1/20/22)  6. Melatonin 3mg QHs for insomnia  7. ECT MWF (started 1/28/22)  -CBC, CMP, COVID-19 PCR, and EKG wnl  -Dental consult- normal mouth guard  8. With regards to vague physical complaints - pt does not objectively appear unwell or unsteady. He is eating and drinking, and vitals and recent labs (including CBC, CMP, B12, folate, ammonia level) have been within normal limits. Pt's confusion and AMS that he initially presented with have also improved significantly/resolved. Pt remains on iron supplementation and Hb has been uptrending. Given that pt presented to [place] prior to current hospitalization with AMS, r/o dx include Wernicke's encephalopathy, however he was treated with IV thiamine during hospitalization at [place] (and oral supplementation just recently d/c). Will continue to monitor. Of note MMSE (1/10) at 27 and2/27 was 29. Repeat CBC and CMP wnl 1/27/22.  9. Dispo - to rehab in [place], pending placement

## 2022-02-05 RX ADMIN — Medication 325 MILLIGRAM(S): at 08:11

## 2022-02-05 RX ADMIN — Medication 50 MILLIGRAM(S): at 20:50

## 2022-02-05 RX ADMIN — Medication 225 MILLIGRAM(S): at 08:12

## 2022-02-05 RX ADMIN — Medication 3 MILLIGRAM(S): at 20:50

## 2022-02-05 RX ADMIN — Medication 50 MILLIGRAM(S): at 08:12

## 2022-02-06 LAB — SARS-COV-2 RNA SPEC QL NAA+PROBE: SIGNIFICANT CHANGE UP

## 2022-02-06 RX ADMIN — Medication 225 MILLIGRAM(S): at 08:12

## 2022-02-06 RX ADMIN — Medication 50 MILLIGRAM(S): at 08:12

## 2022-02-06 RX ADMIN — Medication 325 MILLIGRAM(S): at 08:12

## 2022-02-06 RX ADMIN — Medication 3 MILLIGRAM(S): at 20:25

## 2022-02-06 RX ADMIN — Medication 50 MILLIGRAM(S): at 20:25

## 2022-02-07 PROCEDURE — 99232 SBSQ HOSP IP/OBS MODERATE 35: CPT

## 2022-02-07 RX ORDER — MIDAZOLAM HYDROCHLORIDE 1 MG/ML
2 INJECTION, SOLUTION INTRAMUSCULAR; INTRAVENOUS ONCE
Refills: 0 | Status: DISCONTINUED | OUTPATIENT
Start: 2022-02-07 | End: 2022-02-07

## 2022-02-07 RX ADMIN — Medication 50 MILLIGRAM(S): at 21:08

## 2022-02-07 RX ADMIN — Medication 225 MILLIGRAM(S): at 08:21

## 2022-02-07 RX ADMIN — Medication 50 MILLIGRAM(S): at 08:21

## 2022-02-07 RX ADMIN — Medication 3 MILLIGRAM(S): at 21:08

## 2022-02-07 RX ADMIN — Medication 325 MILLIGRAM(S): at 08:22

## 2022-02-07 RX ADMIN — MIDAZOLAM HYDROCHLORIDE 2 MILLIGRAM(S): 1 INJECTION, SOLUTION INTRAMUSCULAR; INTRAVENOUS at 12:30

## 2022-02-07 NOTE — BH INPATIENT PSYCHIATRY PROGRESS NOTE - NSBHMETABOLIC_PSY_ALL_CORE_FT
BMI:   HbA1c: A1C with Estimated Average Glucose Result: 5.4 % (12-21-21 @ 09:46)    Glucose: POCT Blood Glucose.: 95 mg/dL (11-30-21 @ 05:53)    BP: 149/85 (02-07-22 @ 13:38) (128/86 - 153/89)  Lipid Panel: Date/Time: 12-21-21 @ 09:46  Cholesterol, Serum: 148  Direct LDL: --  HDL Cholesterol, Serum: 40  Total Cholesterol/HDL Ration Measurement: --  Triglycerides, Serum: 55

## 2022-02-07 NOTE — BH INPATIENT PSYCHIATRY PROGRESS NOTE - NSBHFUPINTERVALHXFT_PSY_A_CORE
No acute overnight events. Patient compliant with medications and in good behavioral control. No PRN meds needed for agitation. Per nursing, pt. appearing brighter, seen smiling more, and going to groups. Pt. seen in room. Overall appears less tense and brighter. Says that his mood is still low and his sleep is just okay, appetite is good. Says that he has been going to groups and feeling more social. Called his family this weekend and had a nice conversation with them. Says that he still has paranoia but his response is vague with no specific examples. Does not have any issues with ECT. Endorses passive death wish and conditional SI in the context of homelessness but denies active SI with intent or plan. Denies HI or AVH.

## 2022-02-07 NOTE — BH INPATIENT PSYCHIATRY PROGRESS NOTE - NSBHASSESSSUMMFT_PSY_ALL_CORE
Pt remains in good behavioral control and is cooperative although more irritable. Started on ECT on 1/28/22 for MDD with psychosis with some improvement, will continue to monitor mood and paranoia. Continues to be hopeful for ECT.    Plan:  1. Pt s/p Aristada loading on 12/30/21, monthly Aristada 882mg 1/27/22. Next dose due 2/27/22.  3. C/w Effexor  mg daily  4. C/w Atarax 50mg BID for anxiety  5. Nicorette gum for cigarette cravings (1/20/22)  6. Melatonin 3mg QHs for insomnia  7. ECT MWF (started 1/28/22)  -CBC, CMP, COVID-19 PCR, and EKG wnl  -Dental consult- normal mouth guard  8. With regards to vague physical complaints - pt does not objectively appear unwell or unsteady. He is eating and drinking, and vitals and recent labs (including CBC, CMP, B12, folate, ammonia level) have been within normal limits. Pt's confusion and AMS that he initially presented with have also improved significantly/resolved. Pt remains on iron supplementation and Hb has been uptrending. Given that pt presented to [place] prior to current hospitalization with AMS, r/o dx include Wernicke's encephalopathy, however he was treated with IV thiamine during hospitalization at [place] (and oral supplementation just recently d/c). Will continue to monitor. Of note MMSE (1/10) at 27 and2/27 was 29. Repeat CBC and CMP wnl 1/27/22.  9. Dispo - to rehab in [place], pending placement

## 2022-02-07 NOTE — BH INPATIENT PSYCHIATRY PROGRESS NOTE - NSBHCHARTREVIEWVS_PSY_A_CORE FT
Vital Signs Last 24 Hrs  T(C): 36.7 (02-07-22 @ 13:38), Max: 37 (02-07-22 @ 11:49)  T(F): 98 (02-07-22 @ 13:38), Max: 98.6 (02-07-22 @ 11:49)  HR: 79 (02-07-22 @ 13:38) (66 - 89)  BP: 149/85 (02-07-22 @ 13:38) (132/82 - 153/89)  BP(mean): --  RR: 18 (02-07-22 @ 13:15) (16 - 21)  SpO2: 95% (02-07-22 @ 13:15) (93% - 100%)     Vital Signs Last 24 Hrs  T(C): 36.4 (02-08-22 @ 08:14), Max: 36.8 (02-07-22 @ 18:47)  T(F): 97.5 (02-08-22 @ 08:14), Max: 98.2 (02-07-22 @ 18:47)  HR: 79 (02-07-22 @ 13:38) (66 - 89)  BP: 149/85 (02-07-22 @ 13:38) (132/82 - 151/77)  BP(mean): --  RR: 18 (02-07-22 @ 13:15) (16 - 21)  SpO2: 95% (02-07-22 @ 13:15) (93% - 100%)    Orthostatic VS  02-08-22 @ 08:14  Lying BP: --/-- HR: --  Sitting BP: 130/80 HR: 92  Standing BP: --/-- HR: --  Site: --  Mode: --

## 2022-02-07 NOTE — BH INPATIENT PSYCHIATRY PROGRESS NOTE - CASE SUMMARY
Pt continues to demonstrate some improvement in mood, anxiety, and paranoia. Appears brighter and noted to be more engaged on the unit, attending groups. Still with intermittent vague paranoia though this is much less intense/frequent. Also with ongoing passive SI, though no intent or plan. ECT #5 planned for today. C/w monthly Aristada and Effexor.

## 2022-02-07 NOTE — BH INPATIENT PSYCHIATRY PROGRESS NOTE - MSE UNSTRUCTURED FT
Patient is a 50yo man who appears his stated age. He is alert and oriented to person, place, and time with fair grooming and hygiene. Patient is cooperative and calm but irritable. Makes fair eye contact. Speech is normal volume and rate. Patient describes his mood as "low" and affect is constricted, depressed, but not irritable. Patient is linear and goal-directed. Thought content endorses passive death wish but denies active SI (does endorse conditional SI about going back to previous life and homeless). Endorses paranoia but vague with no specific examples. A&Ox3. Memory intact. Insight and judgment are fair. Behavioral control is intact.     MMSE completed 1/27/22 - 29/30

## 2022-02-08 PROCEDURE — 99232 SBSQ HOSP IP/OBS MODERATE 35: CPT

## 2022-02-08 RX ADMIN — Medication 50 MILLIGRAM(S): at 20:19

## 2022-02-08 RX ADMIN — Medication 325 MILLIGRAM(S): at 08:13

## 2022-02-08 RX ADMIN — Medication 225 MILLIGRAM(S): at 08:13

## 2022-02-08 RX ADMIN — Medication 50 MILLIGRAM(S): at 08:13

## 2022-02-08 RX ADMIN — Medication 3 MILLIGRAM(S): at 20:19

## 2022-02-08 NOTE — BH INPATIENT PSYCHIATRY PROGRESS NOTE - NSBHASSESSSUMMFT_PSY_ALL_CORE
Pt remains in good behavioral control and causing no issues on the unit, overall improving, seen smiling and going to groups. Started on ECT on 1/28/22 for MDD with psychosis with some improvement, will continue to monitor mood and paranoia. Hopeful for ECT and discharge to rehab facility.    Plan:  1. Pt s/p Aristada loading on 12/30/21, monthly Aristada 882mg 1/27/22. Next dose due 2/27/22.  3. C/w Effexor  mg daily  4. C/w Atarax 50mg BID for anxiety  5. Nicorette gum for cigarette cravings (1/20/22)  6. Melatonin 3mg QHs for insomnia  7. ECT MWF (started 1/28/22)  8. MMSE (1/10) at 27 and 2/27 was 29.  9. Dispo - to rehab in [place], possible Tuesday 2/15     Mood and related psychosis continuing to improve; pt appears brighter and less paranoid, noted to be more engaged in therapeutic milieu of unit. Remains in good behavioral control. Pt more hopeful about the future and denying S/I/I/P. Remains motivated to attend rehab.     Plan:  1. C/w acute course of ECT 3x/week, treatment #6 tomorrow 2/9  2. Pt s/p Aristada loading on 12/30/21, and first monthly maintenance Aristada 882mg administered 1/27/22. Next dose due 2/27/22.  3. C/w Effexor XR 225mg daily  4. C/w Atarax 50mg BID for anxiety  5. Nicorette gum for cigarette cravings   6. Melatonin 3mg QHs for insomnia  7. Dispo - to rehab in [place] once pt completes acute course of ECT

## 2022-02-08 NOTE — BH INPATIENT PSYCHIATRY PROGRESS NOTE - NSBHFUPINTERVALHXFT_PSY_A_CORE
No acute overnight events. Patient compliant with medications and in good behavioral control. No PRN meds needed for agitation. Per nursing, pt. appearing brighter, seen smiling more, going to groups, and doing overall better. Pt. seen on the unit. Overall appears less tense and brighter. Says that his mood is "okay." Says his sleep was poor last night, waking up panicking and with anxiety throughout the night. Appetite remains good. Does not remember going to group but agrees that he is improving since ECT, feeling like his mood has improved. Spoke to nephew recently. Does not bring up paranoia during interview. Does not have any issues with ECT. Denies passive death wish or active suicidal ideation with intent or plan. Denies homicidal ideation or auditory or visual hallucinations. No acute overnight events. Patient compliant with medications and in good behavioral control. No PRN meds needed. Per nursing, pt. appearing brighter, seen smiling more, going to groups, and doing overall better. Pt. seen on the unit. Overall appears less tense and brighter. Says that his mood is "okay." Says his sleep was poor last night, waking up panicking and with anxiety throughout the night. Appetite remains good. Does not remember going to group but agrees that he is improving since ECT, feeling like his mood has improved. Spoke to nephew recently. Does not bring up paranoia during interview. Does not have any issues with ECT. Denies passive death wish or active suicidal ideation with intent or plan. Denies homicidal ideation or auditory or visual hallucinations.

## 2022-02-08 NOTE — BH INPATIENT PSYCHIATRY PROGRESS NOTE - NSBHCHARTREVIEWVS_PSY_A_CORE FT
Vital Signs Last 24 Hrs  T(C): 36.4 (02-08-22 @ 08:14), Max: 36.8 (02-07-22 @ 18:47)  T(F): 97.5 (02-08-22 @ 08:14), Max: 98.2 (02-07-22 @ 18:47)  HR: 79 (02-07-22 @ 13:38) (66 - 89)  BP: 149/85 (02-07-22 @ 13:38) (132/82 - 151/77)  BP(mean): --  RR: 18 (02-07-22 @ 13:15) (16 - 21)  SpO2: 95% (02-07-22 @ 13:15) (93% - 100%)    Orthostatic VS  02-08-22 @ 08:14  Lying BP: --/-- HR: --  Sitting BP: 130/80 HR: 92  Standing BP: --/-- HR: --  Site: --  Mode: --   Vital Signs Last 24 Hrs  T(C): 36.4 (02-08-22 @ 08:14), Max: 36.8 (02-07-22 @ 18:47)  T(F): 97.5 (02-08-22 @ 08:14), Max: 98.2 (02-07-22 @ 18:47)  HR: --  BP: --  BP(mean): --  RR: --  SpO2: --    Orthostatic VS  02-08-22 @ 08:14  Lying BP: --/-- HR: --  Sitting BP: 130/80 HR: 92  Standing BP: --/-- HR: --  Site: --  Mode: --

## 2022-02-08 NOTE — BH INPATIENT PSYCHIATRY PROGRESS NOTE - MSE UNSTRUCTURED FT
Patient is a 52yo man, seen on the unit. He is alert and oriented to person, place, and time with fair grooming and hygiene. Patient is cooperative, calm and pleasant. Makes fair eye contact. Speech is normal volume and rate. Patient describes his mood as "okay" and affect is constricted, depressed, but improving. Patient is linear and goal-directed. Thought content denies passive death wish and active SI (does endorse conditional SI about going back to previous life and homelessness). Does not mention paranoia. Memory intact. Insight and judgment are fair. Behavioral control is intact.     MMSE completed 1/27/22 - 29/30 Patient is a 50yo man with fair grooming and hygiene. Patient is cooperative, calm and pleasant. Makes fair eye contact. No psychomotor abnormalities noted. Speech is normal in volume and rate. Stated mood is "okay." Affect is depressed and constricted, though brighter compared to prior. Thought process is linear and goal-directed. Thought content: denies S/I/I/P, no mention of paranoia previously experienced or other delusions. No perceptual disturbances noted. Insight and judgment are fair. Impulse control is intact.     MMSE completed 1/27/22 - 29/30

## 2022-02-08 NOTE — BH INPATIENT PSYCHIATRY PROGRESS NOTE - NSBHCHARTREVIEWLAB_PSY_A_CORE FT
CBC Full  -  ( 20 Jan 2022 11:26 )  WBC Count : 7.84 K/uL  RBC Count : 6.52 M/uL  Hemoglobin : 14.9 g/dL  Hematocrit : 48.8 %  Platelet Count - Automated : 252 K/uL  Mean Cell Volume : 74.8 fL  Mean Cell Hemoglobin : 22.9 pg  Mean Cell Hemoglobin Concentration : 30.5 gm/dL  Auto Neutrophil # : x  Auto Lymphocyte # : x  Auto Monocyte # : x  Auto Eosinophil # : x  Auto Basophil # : x  Auto Neutrophil % : x  Auto Lymphocyte % : x  Auto Monocyte % : x  Auto Eosinophil % : x  Auto Basophil % : x    01-20    139  |  100  |  14  ----------------------------<  81  4.3   |  24  |  0.55    Ca    9.6      20 Jan 2022 11:26  Mg     1.90     01-20    TPro  7.2  /  Alb  4.3  /  TBili  0.4  /  DBili  x   /  AST  18  /  ALT  22  /  AlkPhos  67  01-20  
Admission labs reviewed and no acute findings  +utox benzo
CBC Full  -  ( 20 Jan 2022 11:26 )  WBC Count : 7.84 K/uL  RBC Count : 6.52 M/uL  Hemoglobin : 14.9 g/dL  Hematocrit : 48.8 %  Platelet Count - Automated : 252 K/uL  Mean Cell Volume : 74.8 fL  Mean Cell Hemoglobin : 22.9 pg  Mean Cell Hemoglobin Concentration : 30.5 gm/dL  Auto Neutrophil # : x  Auto Lymphocyte # : x  Auto Monocyte # : x  Auto Eosinophil # : x  Auto Basophil # : x  Auto Neutrophil % : x  Auto Lymphocyte % : x  Auto Monocyte % : x  Auto Eosinophil % : x  Auto Basophil % : x    01-20    139  |  100  |  14  ----------------------------<  81  4.3   |  24  |  0.55    Ca    9.6      20 Jan 2022 11:26  Mg     1.90     01-20    TPro  7.2  /  Alb  4.3  /  TBili  0.4  /  DBili  x   /  AST  18  /  ALT  22  /  AlkPhos  67  01-20  
CBC Full  -  ( 21 Dec 2021 09:46 )  WBC Count : 6.79 K/uL  RBC Count : 4.91 M/uL  Hemoglobin : 10.5 g/dL  Hematocrit : 36.6 %  Platelet Count - Automated : 268 K/uL  Mean Cell Volume : 74.5 fL  Mean Cell Hemoglobin : 21.4 pg  Mean Cell Hemoglobin Concentration : 28.7 gm/dL  Auto Neutrophil # : 3.65 K/uL  Auto Lymphocyte # : 2.03 K/uL  Auto Monocyte # : 0.90 K/uL  Auto Eosinophil # : 0.14 K/uL  Auto Basophil # : 0.04 K/uL  Auto Neutrophil % : 53.7 %  Auto Lymphocyte % : 29.9 %  Auto Monocyte % : 13.3 %  Auto Eosinophil % : 2.1 %  Auto Basophil % : 0.6 %    12-21    142  |  105  |  19  ----------------------------<  81  4.2   |  28  |  0.58    Ca    8.9      21 Dec 2021 09:46    TPro  5.8<L>  /  Alb  3.7  /  TBili  0.2  /  DBili  x   /  AST  18  /  ALT  17  /  AlkPhos  65  12-21    lipid panel and a1c within normal limits  Depakote level 25
CBC Full  -  ( 20 Jan 2022 11:26 )  WBC Count : 7.84 K/uL  RBC Count : 6.52 M/uL  Hemoglobin : 14.9 g/dL  Hematocrit : 48.8 %  Platelet Count - Automated : 252 K/uL  Mean Cell Volume : 74.8 fL  Mean Cell Hemoglobin : 22.9 pg  Mean Cell Hemoglobin Concentration : 30.5 gm/dL  Auto Neutrophil # : x  Auto Lymphocyte # : x  Auto Monocyte # : x  Auto Eosinophil # : x  Auto Basophil # : x  Auto Neutrophil % : x  Auto Lymphocyte % : x  Auto Monocyte % : x  Auto Eosinophil % : x  Auto Basophil % : x    01-20    139  |  100  |  14  ----------------------------<  81  4.3   |  24  |  0.55    Ca    9.6      20 Jan 2022 11:26  Mg     1.90     01-20    TPro  7.2  /  Alb  4.3  /  TBili  0.4  /  DBili  x   /  AST  18  /  ALT  22  /  AlkPhos  67  01-20  

## 2022-02-08 NOTE — BH INPATIENT PSYCHIATRY PROGRESS NOTE - NSBHCONSDANGERSELF_PSY_A_CORE
unable to care for self

## 2022-02-08 NOTE — BH INPATIENT PSYCHIATRY PROGRESS NOTE - CASE SUMMARY
Mood, anxiety, and related psychosis continuing to improve. Pt appears brighter, calmer, and noted to engage more in therapeutic milieu of unit (less isolative, attending more groups). He is more hopeful about the future, motivated to attend rehab, and now denying SI/I/P. Remains in good behavioral control. ECT #6 tomorrow. C/w monthly Aristada and Effexor. Dispo to rehab in [place] once acute course of ECT is complete.

## 2022-02-09 PROCEDURE — 99233 SBSQ HOSP IP/OBS HIGH 50: CPT | Mod: 25

## 2022-02-09 PROCEDURE — 90870 ELECTROCONVULSIVE THERAPY: CPT

## 2022-02-09 RX ORDER — MIDAZOLAM HYDROCHLORIDE 1 MG/ML
2 INJECTION, SOLUTION INTRAMUSCULAR; INTRAVENOUS ONCE
Refills: 0 | Status: DISCONTINUED | OUTPATIENT
Start: 2022-02-09 | End: 2022-02-09

## 2022-02-09 RX ADMIN — MIDAZOLAM HYDROCHLORIDE 2 MILLIGRAM(S): 1 INJECTION, SOLUTION INTRAMUSCULAR; INTRAVENOUS at 13:36

## 2022-02-09 RX ADMIN — Medication 50 MILLIGRAM(S): at 08:04

## 2022-02-09 RX ADMIN — Medication 50 MILLIGRAM(S): at 20:41

## 2022-02-09 RX ADMIN — Medication 225 MILLIGRAM(S): at 08:03

## 2022-02-09 RX ADMIN — Medication 3 MILLIGRAM(S): at 20:41

## 2022-02-09 RX ADMIN — Medication 325 MILLIGRAM(S): at 08:04

## 2022-02-09 NOTE — BH INPATIENT PSYCHIATRY PROGRESS NOTE - CASE SUMMARY
Mood, anxiety, and related psychosis continuing to improve. Pt appears brighter, calmer, and noted to engage more in therapeutic milieu of unit (less isolative, attending more groups). He is more hopeful about the future, motivated to attend rehab, and now denying SI/I/P. Remains in good behavioral control. Has also been in touch more with family and looking forward to reuniting with them upon discharge. ECT #6 today. C/w monthly Aristada and Effexor. Dispo to rehab in [place] once acute course of ECT is complete.

## 2022-02-09 NOTE — BH INPATIENT PSYCHIATRY PROGRESS NOTE - NSBHCHARTREVIEWVS_PSY_A_CORE FT
Vital Signs Last 24 Hrs  T(C): 36.1 (02-09-22 @ 14:20), Max: 37.3 (02-08-22 @ 20:05)  T(F): 97 (02-09-22 @ 14:20), Max: 99.2 (02-08-22 @ 20:05)  HR: 80 (02-09-22 @ 14:20) (71 - 96)  BP: 154/95 (02-09-22 @ 14:20) (136/86 - 155/98)  BP(mean): --  RR: 18 (02-09-22 @ 14:20) (16 - 22)  SpO2: 100% (02-09-22 @ 14:20) (94% - 100%)    Orthostatic VS  02-08-22 @ 08:14  Lying BP: --/-- HR: --  Sitting BP: 130/80 HR: 92  Standing BP: --/-- HR: --  Site: --  Mode: --

## 2022-02-09 NOTE — BH INPATIENT PSYCHIATRY PROGRESS NOTE - MSE UNSTRUCTURED FT
Patient is a 50yo man with fair grooming and hygiene. Patient is cooperative, calm and pleasant. Makes fair eye contact. No psychomotor abnormalities noted. Speech is normal in rate, rhythm, and volume. Stated mood is "okay." Affect is depressed and constricted, though remains brighter than previously. Thought process is linear and goal-directed. Thought content: denies S/I/I/P, no mention of paranoia previously experienced or other delusions. No perceptual disturbances noted. Insight and judgment are fair. Impulse control is intact.     MMSE completed 1/27/22 - 29/30

## 2022-02-09 NOTE — BH INPATIENT PSYCHIATRY PROGRESS NOTE - NSBHASSESSSUMMFT_PSY_ALL_CORE
Mood and related psychosis continuing to improve; pt appears brighter and less paranoid, noted to be more engaged in therapeutic milieu of unit. Remains in good behavioral control. Pt more hopeful about the future and denying S/I/I/P. Remains motivated to attend rehab. Reports good effect from ECT.    Plan:  1. C/w acute course of ECT 3x/week, treatment #6 today 2/9  2. Pt s/p Aristada loading on 12/30/21, and first monthly maintenance Aristada 882mg administered 1/27/22. Next dose due 2/27/22.  3. C/w Effexor XR 225mg daily  4. C/w Atarax 50mg BID for anxiety  5. Nicorette gum for cigarette cravings   6. Melatonin 3mg QHs for insomnia  7. Dispo - to rehab in [place] once pt completes acute course of ECT

## 2022-02-09 NOTE — BH INPATIENT PSYCHIATRY PROGRESS NOTE - NSBHMETABOLIC_PSY_ALL_CORE_FT
BMI: BMI (kg/m2): 33.2 (02-09-22 @ 13:11)  HbA1c: A1C with Estimated Average Glucose Result: 5.4 % (12-21-21 @ 09:46)    Glucose: POCT Blood Glucose.: 95 mg/dL (11-30-21 @ 05:53)    BP: 154/95 (02-09-22 @ 14:20) (132/82 - 155/98)  Lipid Panel: Date/Time: 12-21-21 @ 09:46  Cholesterol, Serum: 148  Direct LDL: --  HDL Cholesterol, Serum: 40  Total Cholesterol/HDL Ration Measurement: --  Triglycerides, Serum: 55

## 2022-02-09 NOTE — BH INPATIENT PSYCHIATRY PROGRESS NOTE - NSBHFUPINTERVALHXFT_PSY_A_CORE
No acute overnight events. Patient adherent with medications and in good behavioral control. No PRN meds needed. Per nursing, pt. appearing brighter, seen smiling more, conversing with peers. Pt. seen in room. Mood is "okay" today, saying that it is consistently better than on admission. Sleep and appetite remain good. Says that he is more social with better mood since starting ECT. Denies passive death wish or active suicidal ideation with intent or plan. Does not bring up paranoia during interview. Denies homicidal ideation or auditory or visual hallucinations. Spoke to mom yesterday, went well.  No acute overnight events. Patient adherent with medications and in good behavioral control. No PRN meds needed. Per nursing, pt. appearing brighter, seen smiling more, conversing with peers. Pt. seen in room. Mood is "okay" today, saying that it is consistently better than on admission. Sleep and appetite remain good. Says that he is more social with better mood since starting ECT. Denies passive death wish or active suicidal intent or plan. Does not bring up paranoia during interview. Denies homicidal ideation or auditory or visual hallucinations. Spoke to mom yesterday, went well.

## 2022-02-10 RX ADMIN — Medication 50 MILLIGRAM(S): at 20:44

## 2022-02-10 RX ADMIN — Medication 225 MILLIGRAM(S): at 08:16

## 2022-02-10 RX ADMIN — Medication 325 MILLIGRAM(S): at 08:16

## 2022-02-10 RX ADMIN — Medication 3 MILLIGRAM(S): at 20:44

## 2022-02-10 RX ADMIN — Medication 50 MILLIGRAM(S): at 08:16

## 2022-02-10 NOTE — BH DISCHARGE NOTE NURSING/SOCIAL WORK/PSYCH REHAB - NSDCPRGOAL_PSY_ALL_CORE
Writer met with patient to safety plan for discharge and discuss the patient’s progress throughout the inpatient stay. Patient was receptive to safety planning and readily identified coping skills, triggers, social support, and reasons for living. Pt was admitted in the context of paranoia and depression. On arrival to unit, pt appeared incredibly disheveled, depressed, and was isolative. Pt was often very inappropriate with staff members during discussion and did not meaningfully engage with others. During the inpatient stay, the patient became intermittently visible on the unit, started to participate in groups, and with time, medication and treatment compliance, support from the treatment team, and immense encouragement, pt became much brighter and less paranoid/depressed overall. Pt engaged in ECT treatment and endorsed improvements with ECT. Pt reported wish and goal to remain sober and not engage in alcohol and drug use. Pt became better able to meaningfully engage in discussion with both his peers and the treatment team. Pt started to endorse that he was feeling "much better" and began to display this in his overall affect and mood. At discharge, the patient presents as thankful, calm, and cooperative. Pt reported his thanks to the treatment team for their assistance and wish to stay "well." The patient was able to meet his psychiatric rehabilitation goal during stay. The patient exhibits medication compliance, fair ADLs, and good behavioral control. The patient denies SI/HI/AH/VH upon discharge from unit today.

## 2022-02-10 NOTE — BH TREATMENT PLAN - NSBHPRIMARYDX_PSY_ALL_CORE
Schizophrenia    
Schizophrenia    
Severe recurrent major depressive disorder with psychotic features    
Schizophrenia    
Severe recurrent major depressive disorder with psychotic features    
Severe recurrent major depressive disorder with psychotic features

## 2022-02-10 NOTE — BH TREATMENT PLAN - NSTXIMPULSINTERMD_PSY_ALL_CORE
Treatment with medications and ECT
Treatment with medications
Treatment with medications and ECT
Treatment with medications and ECT

## 2022-02-10 NOTE — BH INPATIENT PSYCHIATRY PROGRESS NOTE - MSE UNSTRUCTURED FT
Patient is a 50yo man with fair grooming and hygiene. Patient is cooperative, calm and pleasant. Makes fair eye contact. No psychomotor abnormalities noted. Speech is normal in rate, rhythm, and volume. Stated mood is "okay." Affect is euthymic and constricted, appearing brighter. Thought process is linear and goal-directed. Thought content: denies S/I/I/P, only intermittent paranoia but much less than before and no other delusions. No perceptual disturbances noted. Insight and judgment are fair. Impulse control is intact.     MMSE completed 1/27/22 - 29/30

## 2022-02-10 NOTE — BH DISCHARGE NOTE NURSING/SOCIAL WORK/PSYCH REHAB - NSCDUDCCRISIS_PSY_A_CORE
[facility] Well  1 [phone] [facility]-WELL [phone])  Text "WELL" to 06859  Website: www.[employer]/.[facility] 1 (972) 751-UKBH (1783) Website: www.safehorizon.org/.National Suicide Prevention Lifeline 4 (005) 546-1200/.  [facility]  1 (527) LIFENET (545-3990)/.  [facility]  [address] [place] [address] [phone]   Hours:  Monday through Friday from 9 AM to 3 PM/.  [place] [employer] [facility] - [facility] Crisis Line  [phone], Option 1 [facility] Well  1 [phone]) [facility]-WELL [phone])  Text "WELL" to 79560  Website: www.CNZZ/.[facility] 1 (692) 261-IKBG (4387) Website: www.safehorizon.org/.National Suicide Prevention Lifeline 1 (166) 332-0018/.  [facility]  1 (318) LIFENET (113-5654)/.  [facility]’s Behavioral [facility] Crisis [facility]  [address] [place] [address] [phone]   Hours:  Monday through Friday from 9 AM to 3 PM [facility] Well  1 [phone] [facility]-WELL [phone])  Text "WELL" to 09349  Website: www.[employer]/.[facility] 1 (426) 601-MXTC (6446) Website: www.safehorizon.org/.National Suicide Prevention Lifeline 9 (962) 768-2295/.  [facility]  1 (671) LIFENET (895-2763)/.  [facility]’s Behavioral [facility] Crisis [facility]  [address] [place] [address] [phone]   Hours:  Monday through Friday from 9 AM to 3 PM/.  [place] Dept of  Affairs - [facility] Crisis Line  [phone], Option 1

## 2022-02-10 NOTE — BH TREATMENT PLAN - NSTXPROBDCHOUS_PSY_ALL_CORE
DISCHARGE ISSUE - LACK OF APPROPRIATE HOUSING

## 2022-02-10 NOTE — BH TREATMENT PLAN - NSTXDCHOUSGOAL_PSY_ALL_CORE
Will agree to participate in housing process
Will meet with care coordinator and accept services
Will agree to participate in housing process

## 2022-02-10 NOTE — BH INPATIENT PSYCHIATRY PROGRESS NOTE - NSBHASSESSSUMMFT_PSY_ALL_CORE
Mood and related psychosis continuing to improve; pt appears brighter and less paranoid, noted to be more engaged in therapeutic milieu of unit. Remains in good behavioral control. Pt more hopeful about the future and denying S/I/I/P. Remains motivated to attend rehab. Reports good effect from ECT. Feels hopeful for future and wants to go to rehab facility on d/c.    Plan:  1. C/w acute course of ECT 3x/week, treatment #7 tomorrow 2/11/22  2. Pt s/p Aristada loading on 12/30/21, and first monthly maintenance Aristada 882mg administered 1/27/22. Next dose due 2/27/22.  3. C/w Effexor XR 225mg daily  4. C/w Atarax 50mg BID for anxiety  5. Nicorette gum for cigarette cravings   6. Melatonin 3mg QHs for insomnia  7. Dispo - to rehab in [place] once pt completes acute course of ECT

## 2022-02-10 NOTE — BH INPATIENT PSYCHIATRY PROGRESS NOTE - NSBHCHARTREVIEWVS_PSY_A_CORE FT
Vital Signs Last 24 Hrs  T(C): 36.1 (02-10-22 @ 08:21), Max: 36.6 (02-09-22 @ 19:04)  T(F): 97 (02-10-22 @ 08:21), Max: 97.9 (02-09-22 @ 19:04)  HR: 80 (02-10-22 @ 08:21) (71 - 96)  BP: 145/85 (02-10-22 @ 08:21) (136/86 - 155/98)  BP(mean): --  RR: 18 (02-09-22 @ 14:20) (16 - 22)  SpO2: 100% (02-09-22 @ 14:20) (94% - 100%)     Vital Signs Last 24 Hrs  T(C): 37 (02-11-22 @ 12:55), Max: 37 (02-11-22 @ 12:55)  T(F): 98.6 (02-11-22 @ 12:55), Max: 98.6 (02-11-22 @ 12:55)  HR: 77 (02-11-22 @ 13:10) (77 - 92)  BP: 151/90 (02-11-22 @ 13:10) (143/85 - 163/93)  BP(mean): --  RR: 18 (02-11-22 @ 13:10) (12 - 19)  SpO2: 100% (02-11-22 @ 13:10) (99% - 100%)    Orthostatic VS  02-11-22 @ 08:19  Lying BP: --/-- HR: --  Sitting BP: 140/55 HR: 74  Standing BP: --/-- HR: --  Site: --  Mode: --

## 2022-02-10 NOTE — BH TREATMENT PLAN - NSTXPSYCHOGOAL_PSY_ALL_CORE
Will verbalize 1 strategy to successfully cope with psychotic symptoms

## 2022-02-10 NOTE — BH INPATIENT PSYCHIATRY PROGRESS NOTE - CASE SUMMARY
Mood and paranoia continuing to improve. Pt consistently denying SIIP and less isolative (attendings groups, more social with peers). Remains in good behavioral control and compliant with meds. Feeling much more hopeful about the future and more consistently in touch with family. Will c/w monthly Aristada and Effexor. ECT #7 tomorrow. Dispo to rehab once acute course of ECT is complete.

## 2022-02-10 NOTE — BH INPATIENT PSYCHIATRY PROGRESS NOTE - NSBHMETABOLIC_PSY_ALL_CORE_FT
BMI: BMI (kg/m2): 33.2 (02-09-22 @ 13:11)  HbA1c: A1C with Estimated Average Glucose Result: 5.4 % (12-21-21 @ 09:46)    Glucose: POCT Blood Glucose.: 95 mg/dL (11-30-21 @ 05:53)    BP: 145/85 (02-10-22 @ 08:21) (132/82 - 155/98)  Lipid Panel: Date/Time: 12-21-21 @ 09:46  Cholesterol, Serum: 148  Direct LDL: --  HDL Cholesterol, Serum: 40  Total Cholesterol/HDL Ration Measurement: --  Triglycerides, Serum: 55   BMI: BMI (kg/m2): 33.2 (02-09-22 @ 13:11)  HbA1c: A1C with Estimated Average Glucose Result: 5.4 % (12-21-21 @ 09:46)    Glucose: POCT Blood Glucose.: 95 mg/dL (11-30-21 @ 05:53)    BP: 151/90 (02-11-22 @ 13:10) (136/86 - 163/93)  Lipid Panel: Date/Time: 12-21-21 @ 09:46  Cholesterol, Serum: 148  Direct LDL: --  HDL Cholesterol, Serum: 40  Total Cholesterol/HDL Ration Measurement: --  Triglycerides, Serum: 55

## 2022-02-10 NOTE — BH TREATMENT PLAN - NSTXDCHOUSINTERSW_PSY_ALL_CORE
Support and psycheod being provided and discharge plans being arranged.  He was, reportedly, accepted at a rehab facility and is moving towards discharge readiness.
Pt would benefit from continued acceptance of assistance.
Pt would benefit from ongoing out Pt assistance along with the housing assistance.
SW will provide psychoeducation, support and discharge planning.  Collateral supports and referrals to rehab. facitities to be coordinate discharge planning.
Pt would benefit from insurance benefits to obtain necessary benefits.   Pt would benefit from a shelter application or return to shelter application.
Pt would benefit from continued cooperation with Tx team
Pt would benefit from ongoing out Pt assistance along with the housing assistance.
Pt would continue to benefit from his compliance for Tx and Tx needs.  This will assist Pt in returning to his home with his family.

## 2022-02-10 NOTE — BH TREATMENT PLAN - NSTXPLANTHERAPYSESSIONSFT_PSY_ALL_CORE
01-02-22  Type of therapy: Due to the COVID-19 protocols and policies in place to ensure pt safety, groups have been temporarily supplemented with individual rounding and activities at this time. Pt does not currently display interest in individual initiatives and remains mostly isolative to personal room.   Type of session: Individual  Level of patient participation: Participates,aggitated  Duration of participation: 15 minutes  Therapy conducted by: Psych rehab  Therapy Summary: Writer met with pt to discuss and assess progress towards specified psychiatric rehabilitation goals this week. On approach, pt lying in personal bed with lights off and appeared discheveled and unkept. Pt's room had a malodorous scent as if he has not showered. Pt encouraged to maintain ADL's. This week, patient has not demonstrated much progress towards psychiatric rehabilitation goal of identifying and utilizing coping skills. Last week, pt reported that sleeping is the only thing that assists in management of symptoms. This week pt very irritable that "staff is telling me what to do and I feel like I cant do anything." When asked about current mood, pt stated in a sharp manner, "I'm just aggitated." Pt went on to sharing how he feels he has no agency here and "cant do anything." Pt reported "they tell me to go outside, they tell me to go to my room, they tell me to go out for fresh air." Despite writer attempting to ground pt in unit protocols and functions, pt remained help rejecting and aggitated. Towards the end of discussion, pt did thank writer for "listening." Pt did not sit up from lying position during discussion, nor look writer in the eyes. Pt's demeanor was dismissive in nature. Pt somewhat perseverative on discharge from unit. Pt seems depressed overall. Pt reported sleeping well, and has demonstrated daily compliance with medications. Pt semi-social with select peers in the community. Pt denied SI/HI/I/P and AH/VH/TH. Psychiatric rehabilitation recommends patient continue engage in individual sessions and participate in activities in the milieu to continue engaging with others and organizing thoughts.  
  01-09-22  Type of therapy: Other,In Response to the covid-19 outbreak there has been a shift in hospital wide policies and protocols. As a result, it should be noted unit activities and structure have been temporarily modified to promote safety of pt. and staff.  Type of session: Individual  Level of patient participation: Attentive,Engaged,Participates  Duration of participation: 15 minutes  Therapy conducted by: Psych rehab  Therapy Summary: Writer met with pt to discuss and assess current progress towards specified psychiatric rehabilitation goals this week. On approach, pt was sitting up in personal bed, and appeared well-kept and clean. Pt reported having a bit of a "headache" in therefore, "feeling crappy" however, he continued to display eagerness to meet with writer and share in a discussion related to his recovery process and journey towards discharge and beyond. Pt reported some anxiousness or "nervousness" in relation to discharge and expressed not feeling that "I have what I need to go." Writer assured pt that the team is working to provide the best case senario discharge plan for him and that includes him feeling comfortable. Writer asked pt what he felt he needed to prepare for discharge and pt reported that he needed some clothing. Writer assured pt that she couldprovide him with clothing. Pt thankful and seemed to become more at ease. Pt opened up about his past history with substance and alcohol use and reported not wanting to "relapse." Pt reported feelings of worry that he wouldnt be able to remain in control of "urges" to use substances. Pt reported wanting to "get it right" this time and go through a rehab. Writer provided pt with psychoeducation on coping skills, and relapse prevention. Pt and writer discussed self-worrth/self-esteem/ and concepts of self-encouragement. Pt receptive and nodding head in agreement during discussion. Pt not irritable at all as compared to previous weeks. Pt polite, soft spoken, and calm. Pt well mannered. Pt denied SI/HI/I/P and AH/VH/TH. Pt displaying progress towards goal at this time. Pt identified that "talking" helps him to cope, however pt continues to work towards identifying a internal coping strategy for better symptom management and sustained recovery. Overall, pt more pleasent today and amenable to discussion with writer. Pt appeared brighter and more willing to engage in therapeutic initiatives and discussion. Pt thankful towards writer. Will continue to support pt towards specified psychiatric rehabiltiation goals throughout current hospitalization.    In Response to the covid-19 outbreak there has been a shift in hospital wide policies and protocols. As a result, it should be noted unit activities and structure have been temporarily modified to promote safety of pt. and staff. Pt currently engaging in individual rounding initiatives provided by psych rehab staff and peer advocate staff. Peer advocate noted positive interraction with pt this week where pt was receptive to the information and eduction shared/provided by the peer advocate. Will continue to support pt throughout current hospitalization and towards goal progress and success.  
  02-06-22  Type of therapy: Coping skills,Leisure development,Mindfulness,Peer advocate  Type of session: Individual  Level of patient participation: Resistance to participation  Duration of participation: Less than 15 minutes  Therapy conducted by: Psych rehab  Therapy Summary: Writer attempted to meet with patient for an individual session in order to review progress towards psychiatric rehabilitation goals. Patient declined to meet, stating he wasn’t “feeling up for it today.” Therefore, the following will be presented from patient’s chart and staff collateral over the past seven days. Patient has demonstrated some improvements in psychiatric symptoms over the past week. Patient has presented with overall brighter affect and more hope. Patient continues to endorse some paranoia though he has improved. Patient has been adherent to medication and is receiving ECT which he has tolerated well. Patient’s sleep and appetite are fair. Due to the COVID-19 pandemic, unit structure and activities are re-evaluated on a consistent basis in effort to maintain the safety of patients and staff. Patient has been increasingly participating in psychiatric rehabilitation groups and engages appropriately during sessions. Patient is intermittently visible on the unit and maintains good behavioral control. Patient is social with select peers and able to make needs known to staff.  
  01-23-22  Type of therapy: Pt engages in individual rounding provided by psychiatric rehabilitation staff members.   Type of session: Individual  Level of patient participation: Engaged,Participates,negativisitc  Duration of participation: 30 minutes  Therapy conducted by: Psych rehab  Therapy Summary: Writer met with pt to discuss and assess pt's progress towards specified pyschiatric rehabilitation goals this week. On approach, pt lying in personal bed and appears depressed and somber. Pt however was open to listening to writer's suggestions and psychoeducation and later during discussion, pt's mood began to improve. Pt became more amenable to sharing in discussion with writer overall. Pt making some improvements towards psych rehab goal of identifying and utilizing 2 coping skills to meet their needs. However, pt continues to present with paranoid thoughts of "everyone hates me" and "I feel like you guys are just going to throw me out of here at any moment." Pt then began to share that he "feels like a freeloader" beacuse of the support he is recieving from staff and that he will be recieving from his family members. Writer shared with pt that people need eachother and when one is in a challenging place in their life, our loved ones are there to support us. Writer reminded pt that not everyone has loved ones and pt able to reframe his thinking and shared, "hopefully I can return the favor someday to all of those that have helped me." With some support and challenging of pt's thoughts, pt able to realize and recognize that his thoughts are not reality based. Pt later asked writer if she could walk with him outside of his room and shared that he wished to sit in the community with peers. Pt and writer then sat out in the therapeutic milieu together and were joined by some of pt's peers. Pt receptive to coping skill and interaction with others. Interaction with others positive. Pt denied SI/HI/I/P and AH/VH/TH during discussion however presenting with some paranoid thoughts. Pt maintaining ADL's on an independent basis at this time and reports adequate sleep and appetite. Pt's mood noteably improved throughout interaction and began with a negativistic/depressed state and became more calm and realxed upon ending of meeting. Will continue to support.  
  12-26-21  Type of therapy: Not engaging in sessions  Type of session: Individual  Level of patient participation: Resistance to participation  Duration of participation: Less than 15 minutes  Therapy conducted by: Psych rehab  Therapy Summary: Writer met with patient for an individual session in order to review progress towards psychiatric rehabilitation goals. Patient was minimally able to engage in conversation with writer, stating that he doesn’t feel well. When asked to elaborate, patient stated “I feel like I’m drunk. They gave me juice and now I feel drunk.” Patient has demonstrated slight improvements in psychiatric symptoms. Patient presents as irritable though he is somewhat more cooperative with staff. Patient has been perseverating on the notion that he is going to be “kicked out” of the hospital and repeatedly asks staff for assurance that he will receive treatment. Patient has been adherent to medication and reported no side effects. Patient endorses some auditory hallucinations. Due to the COVID-19 pandemic, unit structure and activities are re-evaluated on a consistent basis in effort to maintain the safety of patients and staff. Patient is minimally receptive to individual rounding. Patient is mostly isolative to his room, except when he needs assistance from staff. Patient is minimally social with peers and able to make needs known to staff. Patient presents as disheveled with poor ADLs.  
  01-16-22  Type of therapy: Other,In Response to the covid-19 outbreak there has been a shift in hospital wide policies and protocols. As a result, it should be noted unit activities and structure have been temporarily modified to promote safety of pt. and staff.  Type of session: Individual  Level of patient participation: Attentive,Engaged,Participates  Duration of participation: 25 mins  Therapy conducted by: Psych rehab  Therapy Summary: Writer met with pt to discuss and assess progress towards specified psychiatric rehabilitation goals this week. On approach, pt lying in personal bed, and when greeted/engaged by writer, pt sat up and began making eye contact with writer, and engaging in meaningful, reciprocal discussion. Pt stated that his mood is "so so" and he is still feeling "depressed" at times throughout the day. Pt endorsed that "listening to music" and "talking" assists him in feeling less "depressed" however pt has not been observed autonomously engaging in these identified coping skills. As a result of identifying coping skills, pt making steady improvements towards goals. Pt still encouraged to engage in identified coping skills. Pt receptive.  Pt identified today that "Maybe I should learn to make some friends." Writer and pt were able to engage in discussion about pros/cons of emerging from his room more often and pt seemed to agree with writer that engaging in the milieu would be assistive and helpful to him in his recovery process. Writer encouraged pt to attempt to join the community in incriments and make realistic goals such as "I am going to leave my room and watch TV in the community for 10 minutes today." Writer was able to educate pt on socializing with peers and attempted to ground pt in a more reality based concept of socialization (pt tends to present with paranoid thoughts in regards to others talking about him in a negative manner). Pt receptive and thankful for writer's feedback. Pt less paranoid this week and more reality based upon discussion. Pt reported that he "thinks I am dehydrated and hungry." Writer was able to get pt a snack and some water and pt reported feeling a bit better. Pt denied SI/HI/I/P and AH/VH/TH. Pt endorsed dc plan to go to rehab and then to Nephew's home. Pt thankful for his family members who are supportive to him. Will continue to support.  
  01-30-22  Type of therapy: Coping skills,Leisure development,Peer advocate  Type of session: Individual  Level of patient participation: Attentive,Engaged,Participates  Duration of participation: 25 minutes  Therapy conducted by: Psych rehab  Therapy Summary: Writer met with pt to discuss and assess pt’s progress towards specified psychiatric rehabilitation goals this week. On approach, pt lying in personal bed and appears to continue to be a bit depressed, however mood is “much better.” Writer commended pt for attending groups this week and pt smiled. Writer asked pt how it feels to be engaging more in tx and pt shared, “better…actually, a lot better sometimes.”  Writer noted pt less depressed and irritable overall and more receptive to discussion and feedback from staff lately. Pt’s visibility in the community has increased, and pt’s group attendance has significantly increased. Pt is averaging 1-2 groups daily. During the group process, pt is sharing related thoughts when asked and during a discussion group this week, patients were prompted to share one thing they are proud of that they would like to group to know and pt replied, “I am proud that I am sober today.” Pt making more positively related statements in context of his recovery process. Pt and writer were able to engage in meaningful discussion about coping skills and specifically meditation and relaxation techniques. Pt reported that he would try some this week. Pt is much more insightful to self and symptoms sharing “I get paranoid sometimes and I know it’s not true.” Pt seeming to work through paranoid thoughts a bit better through engaging in coping, building a better relationship with himself, and talking to others. Pt calm, cooperative, and polite when speaking with writer. Pt demonstrates daily medication and treatment compliance with good effect. Pt has been observed more visible on the unit, watching TV with select peers. Pt denies SI/HI/I/P and AH/VH/TH at this time. Will continue to support pt towards his goals during pt’s stay.

## 2022-02-10 NOTE — BH DISCHARGE NOTE NURSING/SOCIAL WORK/PSYCH REHAB - PATIENT PORTAL LINK FT
You can access the FollowMyHealth Patient Portal offered by [facility] by registering at the following website: [email or web address]. By joining [facility]’s FollowMyHealth portal, you will also be able to view your health information using other applications (apps) compatible with our system.

## 2022-02-10 NOTE — BH TREATMENT PLAN - NSTXIMPULSGOAL_PSY_ALL_CORE
Will be able to demonstrate the ability to pause before acting out negatively
Will be able to describe/demonstrate alternative ways of managing his/her emotional state on the unit
Will be able to demonstrate the ability to pause before acting out negatively
Will be able to demonstrate the ability to pause before acting out negatively

## 2022-02-10 NOTE — BH INPATIENT PSYCHIATRY PROGRESS NOTE - NSBHFUPINTERVALHXFT_PSY_A_CORE
No acute overnight events. Patient adherent with medications and in good behavioral control. No PRN meds needed. Per nursing, pt. appearing brighter, seen smiling more, conversing with peers, out on the unit more often. Pt. seen in room. Mood is "okay" today. Sleep and appetite remain good. Says that he is more social with better mood since starting ECT. Overall, feels better, less paranoid, and more hopeful than before. Denies passive death wish or active suicidal intent or plan. Intermittently paranoid but much less than before. Denies homicidal ideation or auditory or visual hallucinations. Excited to go to rehab and to see family on d/c.

## 2022-02-10 NOTE — BH TREATMENT PLAN - NSCMSPTSTRENGTHS_PSY_ALL_CORE
Expressive of emotions/Supportive family
Unable to obtain (specify)

## 2022-02-10 NOTE — BH TREATMENT PLAN - NSTXPROBIMPULS_PSY_ALL_CORE
IMPULSIVITY/AGITATION

## 2022-02-10 NOTE — BH DISCHARGE NOTE NURSING/SOCIAL WORK/PSYCH REHAB - DISCHARGE INSTRUCTIONS AFTERCARE APPOINTMENTS
In order to check the location, date, or time of your aftercare appointment, please refer to your Discharge Instructions Document given to you upon leaving the hospital.  If you have lost the instructions please call [phone]

## 2022-02-10 NOTE — BH DISCHARGE NOTE NURSING/SOCIAL WORK/PSYCH REHAB - NSDCPRRECOMMEND_PSY_ALL_CORE
Psychiatric Rehabilitation staff recommends that the patient engage in outpatient services for continued medication and symptom management. Upon discharge, patient has an appointment scheduled with [facility].

## 2022-02-10 NOTE — BH TREATMENT PLAN - NSTXIMPULSINTERRN_PSY_ALL_CORE
Encouraged patient to ventilate his thoughts and feelings to help prevent explosive behaviors
Provide reality testing and redirection
Promote calm environment  Recognize patient's unique triggers  Maintain safe, clutter-free environment
Promote calm environment  Recognize patient's unique triggers  Monitor patient carefully for signs of impulsivity/agitation  Maintain safe, clutter-free environment  Escalate aggression appropriately
Provide reality testing and redirection
Promote calm environment  Recognize patient's unique triggers  Maintain safe, clutter-free environment

## 2022-02-10 NOTE — BH TREATMENT PLAN - NSTXPATIENTPARTICIPATE_PSY_ALL_CORE
Patient participated in identification of needs/problems/goals for treatment/Patient participated in defining interventions/Patient participated in development of after care plan
No, patient unwilling to participate
Patient participated in identification of needs/problems/goals for treatment/Patient participated in defining interventions/Patient participated in development of after care plan

## 2022-02-10 NOTE — BH TREATMENT PLAN - NSTXCOPEINTERRN_PSY_ALL_CORE
Assess curent coping skills  Assess readiness to learn  Encourage daily participation in daily psychoeducational groups and activities   Monitor medication compliance and response
Encouraged verbalization of feelings and thoughts & concerns.
Assist to explore alternate coping and problem solving skills
Assess curent coping skills  Assess readiness to learn  Encourage daily participation in daily psychoeducational groups and activities   Monitor medication compliance and response
Assess curent coping skills  Assess readiness to learn  Encourage daily participation in daily psychoeducational groups and activities   Monitor medication compliance and response
Encouraged patient to attend groups where he may develop coping skills that can help him better deal with his menatl illness/alcoholism
Assist to explore alternate coping and problem solving skills

## 2022-02-10 NOTE — BH TREATMENT PLAN - NSDCCRITERIA_PSY_ALL_CORE
Symptom stabilization  CGI<=3

## 2022-02-10 NOTE — BH TREATMENT PLAN - NSTXPSYCHOINTERRN_PSY_ALL_CORE
Assess thought process daily  Monitor medication compliance and response  Maintain reality orientation daily   Maintain safe and therapeutic process  If command hallucinations present, assess for content and direction
Assess thought process daily  Monitor medication compliance and response  Maintain reality orientation daily   Maintain safe and therapeutic process  If command hallucinations present, assess for content and direction
Provide reality testing and redirection. Educate re med/tx plan
Assess thought process daily  Monitor medication compliance and response  Maintain reality orientation daily   Maintain safe and therapeutic process  If command hallucinations present, assess for content and direction
Provide reality testing and redirection. Educate re med/tx plan

## 2022-02-10 NOTE — BH TREATMENT PLAN - NSTXECTINTERRN_PSY_ALL_CORE
Patient is scheduled for ECT.  Patient educated about ECT and the importance of following instructions as to maintain NPO status after midnight prior to ECT days.  Instruct patient for potential side effects such as feeling dizzy and headaches after the treatment.  Patient to be monitored in dayroom after ECT for at least one hour after ECT due to falls risk.

## 2022-02-11 PROCEDURE — 90870 ELECTROCONVULSIVE THERAPY: CPT

## 2022-02-11 RX ORDER — MIDAZOLAM HYDROCHLORIDE 1 MG/ML
2 INJECTION, SOLUTION INTRAMUSCULAR; INTRAVENOUS ONCE
Refills: 0 | Status: DISCONTINUED | OUTPATIENT
Start: 2022-02-11 | End: 2022-02-11

## 2022-02-11 RX ADMIN — Medication 225 MILLIGRAM(S): at 08:25

## 2022-02-11 RX ADMIN — Medication 50 MILLIGRAM(S): at 20:11

## 2022-02-11 RX ADMIN — Medication 50 MILLIGRAM(S): at 08:25

## 2022-02-11 RX ADMIN — Medication 3 MILLIGRAM(S): at 20:10

## 2022-02-11 RX ADMIN — Medication 325 MILLIGRAM(S): at 08:25

## 2022-02-11 RX ADMIN — MIDAZOLAM HYDROCHLORIDE 2 MILLIGRAM(S): 1 INJECTION, SOLUTION INTRAMUSCULAR; INTRAVENOUS at 12:26

## 2022-02-11 NOTE — BH INPATIENT PSYCHIATRY PROGRESS NOTE - MSE UNSTRUCTURED FT
Patient is a 52yo man with fair grooming and hygiene. Patient is cooperative, calm and pleasant. Makes fair eye contact. No psychomotor abnormalities noted. Speech is normal in rate, rhythm, and volume. Stated mood is "okay." Affect is euthymic and constricted, appearing brighter compared to prior. Thought process is linear and goal-directed. Thought content: denies S/I/I/P, only intermittent paranoia that is much less intense/frequent, no evidence of baljeet delusions. No perceptual disturbances noted. Insight and judgment are good. Impulse control is intact.     MMSE completed 1/27/22 - 29/30

## 2022-02-11 NOTE — BH INPATIENT PSYCHIATRY PROGRESS NOTE - NSBHFUPINTERVALHXFT_PSY_A_CORE
No acute events overnight. Pt compliant with meds, slept well, remains in good behavioral control. Continues to report improved mood and denies SIIP. Reports ongoing vague paranoia though much less intense/frequent and pt no longer concerned about others targeting him/attacking him, no longer with concern about being kicked out of the hospital, denies A/H as well. Remains more engaged in unit milieu and attending groups. Tolerating ECT well with minimal memory impairment. Feeling hopeful about the future and continue treatment at rehab. Looking forward to seeing his family soon.

## 2022-02-11 NOTE — BH INPATIENT PSYCHIATRY PROGRESS NOTE - NSBHASSESSSUMMFT_PSY_ALL_CORE
Mood and paranoia continuing to improve. Pt consistently denying SIIP and less isolative, feeling much more hopeful about the future and more consistently in touch with family.    Plan:  1. C/w acute course of ECT 3x/week, treatment #7 today 2/11/22 and #8 on Monday 2/14/22  2. Pt s/p Aristada loading on 12/30/21, and first monthly maintenance Aristada 882mg administered 1/27/22. Next dose due 2/27/22.  3. C/w Effexor XR 225mg daily  4. C/w Atarax 50mg BID for anxiety  5. Nicorette gum for cigarette cravings   6. Melatonin 3mg QHs for insomnia  7. Dispo - to rehab in [place] once pt completes acute course of ECT

## 2022-02-11 NOTE — BH INPATIENT PSYCHIATRY PROGRESS NOTE - NSBHMETABOLIC_PSY_ALL_CORE_FT
BMI: BMI (kg/m2): 33.2 (02-09-22 @ 13:11)  HbA1c: A1C with Estimated Average Glucose Result: 5.4 % (12-21-21 @ 09:46)    Glucose: POCT Blood Glucose.: 95 mg/dL (11-30-21 @ 05:53)    BP: 151/90 (02-11-22 @ 13:10) (136/86 - 163/93)  Lipid Panel: Date/Time: 12-21-21 @ 09:46  Cholesterol, Serum: 148  Direct LDL: --  HDL Cholesterol, Serum: 40  Total Cholesterol/HDL Ration Measurement: --  Triglycerides, Serum: 55

## 2022-02-11 NOTE — BH INPATIENT PSYCHIATRY PROGRESS NOTE - NSBHCHARTREVIEWVS_PSY_A_CORE FT
Vital Signs Last 24 Hrs  T(C): 37 (02-11-22 @ 12:55), Max: 37 (02-11-22 @ 12:55)  T(F): 98.6 (02-11-22 @ 12:55), Max: 98.6 (02-11-22 @ 12:55)  HR: 77 (02-11-22 @ 13:10) (77 - 92)  BP: 151/90 (02-11-22 @ 13:10) (143/85 - 163/93)  BP(mean): --  RR: 18 (02-11-22 @ 13:10) (12 - 19)  SpO2: 100% (02-11-22 @ 13:10) (99% - 100%)    Orthostatic VS  02-11-22 @ 08:19  Lying BP: --/-- HR: --  Sitting BP: 140/55 HR: 74  Standing BP: --/-- HR: --  Site: --  Mode: --

## 2022-02-11 NOTE — ECT TREATMENT NOTE - NSICDXBHTERTIARYDX_PSY_ALL_CORE
R/O Schizophrenia   F20.9  R/O Schizoaffective disorder, depressive type   F25.1  

## 2022-02-12 RX ADMIN — Medication 3 MILLIGRAM(S): at 20:28

## 2022-02-12 RX ADMIN — Medication 225 MILLIGRAM(S): at 08:49

## 2022-02-12 RX ADMIN — Medication 325 MILLIGRAM(S): at 08:48

## 2022-02-12 RX ADMIN — Medication 50 MILLIGRAM(S): at 20:28

## 2022-02-12 RX ADMIN — Medication 50 MILLIGRAM(S): at 08:49

## 2022-02-13 RX ADMIN — Medication 225 MILLIGRAM(S): at 07:53

## 2022-02-13 RX ADMIN — Medication 50 MILLIGRAM(S): at 07:53

## 2022-02-13 RX ADMIN — Medication 50 MILLIGRAM(S): at 20:24

## 2022-02-13 RX ADMIN — Medication 3 MILLIGRAM(S): at 20:24

## 2022-02-13 RX ADMIN — Medication 325 MILLIGRAM(S): at 07:53

## 2022-02-14 PROCEDURE — 99232 SBSQ HOSP IP/OBS MODERATE 35: CPT | Mod: 25

## 2022-02-14 PROCEDURE — 90870 ELECTROCONVULSIVE THERAPY: CPT

## 2022-02-14 RX ORDER — HYDROXYZINE HCL 10 MG
1 TABLET ORAL
Qty: 60 | Refills: 0
Start: 2022-02-14 | End: 2022-03-15

## 2022-02-14 RX ORDER — LANOLIN ALCOHOL/MO/W.PET/CERES
1 CREAM (GRAM) TOPICAL
Qty: 30 | Refills: 0
Start: 2022-02-14 | End: 2022-03-15

## 2022-02-14 RX ORDER — FERROUS SULFATE 325(65) MG
1 TABLET ORAL
Qty: 30 | Refills: 0
Start: 2022-02-14 | End: 2022-03-15

## 2022-02-14 RX ORDER — VENLAFAXINE HCL 75 MG
3 CAPSULE, EXT RELEASE 24 HR ORAL
Qty: 90 | Refills: 0
Start: 2022-02-14 | End: 2022-03-15

## 2022-02-14 RX ORDER — ARIPIPRAZOLE 15 MG/1
1 TABLET ORAL
Qty: 0 | Refills: 0 | DISCHARGE

## 2022-02-14 RX ORDER — MIDAZOLAM HYDROCHLORIDE 1 MG/ML
2 INJECTION, SOLUTION INTRAMUSCULAR; INTRAVENOUS ONCE
Refills: 0 | Status: DISCONTINUED | OUTPATIENT
Start: 2022-02-14 | End: 2022-02-14

## 2022-02-14 RX ADMIN — MIDAZOLAM HYDROCHLORIDE 2 MILLIGRAM(S): 1 INJECTION, SOLUTION INTRAMUSCULAR; INTRAVENOUS at 14:25

## 2022-02-14 RX ADMIN — Medication 50 MILLIGRAM(S): at 20:12

## 2022-02-14 RX ADMIN — Medication 225 MILLIGRAM(S): at 08:01

## 2022-02-14 RX ADMIN — Medication 50 MILLIGRAM(S): at 08:01

## 2022-02-14 RX ADMIN — Medication 3 MILLIGRAM(S): at 20:12

## 2022-02-14 RX ADMIN — Medication 325 MILLIGRAM(S): at 08:01

## 2022-02-14 NOTE — BH INPATIENT PSYCHIATRY DISCHARGE NOTE - NSBHSUICIDESTATUS_PSY_ALL_CORE
Pt now hopeful, no longer having suicidal ideation or depressed mood. Pt now hopeful, no longer having suicidal ideation or depressed mood (see risk assessment in hospital course)

## 2022-02-14 NOTE — BH INPATIENT PSYCHIATRY DISCHARGE NOTE - NSBHDCRISKMITIGATEFT_PSY_ALL_CORE
Pt. discharged to inpatient rehab where he will receive follow up care and also was discharged on an KIRK.

## 2022-02-14 NOTE — ECT TREATMENT NOTE - NSICDXBHSECONDARYDX_PSY_ALL_CORE
Polysubstance abuse   F19.10  Severe alcohol use disorder   F10.20  

## 2022-02-14 NOTE — ECT PRE-PROCEDURE CHECKLIST - SELECT TESTS ORDERED
Results in MD note/COVID-19
COVID-19
Results in MD note/COVID-19
COVID-19
COVID-19
Results in MD note/COVID-19

## 2022-02-14 NOTE — ECT PRE-PROCEDURE CHECKLIST - TO WHOM
procedural nurse 
procedural nurse 
Mayito RN
procedural nurse 
procedure room RN
procedural nurse 
procedural nurse 
Mayito RN

## 2022-02-14 NOTE — BH INPATIENT PSYCHIATRY PROGRESS NOTE - CASE SUMMARY
Mood and psychosis remain much improved. Pt notably brighter and smiling. Reports improved mood, denies SIIP, and feeling hopeful about the future. No evidence of paranoia or other psychotic symptoms. Looking forward to seeing family tomorrow and motivated to continue treatment at rehab. ECT #8 today. C/w Effexor, monthly Aristada, and Atarax. Dispo planning underway.

## 2022-02-14 NOTE — ECT PRE-PROCEDURE CHECKLIST - NSECTNPODT_PSY_ALL_CORE
27-Jan-2022 18:00
07-Feb-2022 00:00
14-Feb-2022 00:00
30-Jan-2022 21:00
10-Feb-2022 21:00
01-Feb-2022 19:00
09-Feb-2022 08:30
03-Feb-2022 18:00

## 2022-02-14 NOTE — BH INPATIENT PSYCHIATRY DISCHARGE NOTE - NSDCCAREPROVSEEN_GEN_ALL_CORE_FT
Giovanni, Kristopher Cristina, Liss Starr, Ankit Larios, Maria Elena Lazo, Branden Mckeon, Starla Park, Tracee Varma, Himanshu Sagastume, Russ White, Marvin Garcia, Baylee Giovanni, Kristopher Cristina, [facility] GINGER

## 2022-02-14 NOTE — BH INPATIENT PSYCHIATRY PROGRESS NOTE - NSBHASSESSSUMMFT_PSY_ALL_CORE
Improved. Smiling, pleasant, future-oriented and smiling. Pt is speaking to family, making future plans, and excited for his future. Overall, has clinically improved dramatic since starting ECT. No lingering paranoia and no SIIP.    Plan:  1. C/w acute course of ECT 3x/week, treatment #8 today 2/14/22  2. Pt s/p Aristada loading on 12/30/21, and first monthly maintenance Aristada 882mg administered 1/27/22. Next dose due 2/27/22.  3. C/w Effexor XR 225mg daily  4. C/w Atarax 50mg BID for anxiety  5. Nicorette gum for cigarette cravings   6. Melatonin 3mg QHs for insomnia  7. Dispo - to rehab in [place] once pt completes acute course of ECT tomorrow morning 2/15/22     Improved. Smiling, pleasant, future-oriented. Pt is speaking to family, making future plans, and excited for his future. Overall, has clinically improved dramatic since starting ECT. No lingering paranoia and no SIIP.    Plan:  1. C/w acute course of ECT 3x/week, treatment #8 today 2/14/22  2. Pt s/p Aristada loading on 12/30/21, and first monthly maintenance Aristada 882mg administered 1/27/22. Next dose due 2/27/22.  3. C/w Effexor XR 225mg daily  4. C/w Atarax 50mg BID for anxiety  5. Nicorette gum for cigarette cravings   6. Melatonin 3mg QHs for insomnia  7. Dispo - to rehab in [place] once pt completes acute course of ECT tomorrow morning 2/15/22

## 2022-02-14 NOTE — ECT PRE-PROCEDURE CHECKLIST - ALLERGIES
Allergies:-  OxyCODONE Hydrochloride      

## 2022-02-14 NOTE — ECT TREATMENT NOTE - NSECTREVSYS_PSY_ALL_CORE
Neurological/Abdominal/Metabolic/Other

## 2022-02-14 NOTE — BH INPATIENT PSYCHIATRY PROGRESS NOTE - NSBHCHARTREVIEWVS_PSY_A_CORE FT
Vital Signs Last 24 Hrs  T(C): 36.9 (02-14-22 @ 08:07), Max: 36.9 (02-14-22 @ 08:07)  T(F): 98.4 (02-14-22 @ 08:07), Max: 98.4 (02-14-22 @ 08:07)  HR: --  BP: --  BP(mean): --  RR: --  SpO2: --    Orthostatic VS  02-14-22 @ 08:07  Lying BP: --/-- HR: --  Sitting BP: 135/84 HR: 75  Standing BP: --/-- HR: --  Site: --  Mode: --  Orthostatic VS  02-13-22 @ 07:54  Lying BP: --/-- HR: --  Sitting BP: 144/77 HR: 91  Standing BP: --/-- HR: --  Site: --  Mode: --   Vital Signs Last 24 Hrs  T(C): 36.8 (02-14-22 @ 15:10), Max: 36.9 (02-14-22 @ 08:07)  T(F): 98.3 (02-14-22 @ 15:10), Max: 98.4 (02-14-22 @ 08:07)  HR: 77 (02-14-22 @ 15:10) (77 - 89)  BP: 141/91 (02-14-22 @ 15:10) (141/91 - 158/80)  BP(mean): --  RR: 20 (02-14-22 @ 15:10) (16 - 20)  SpO2: 97% (02-14-22 @ 15:10) (96% - 98%)    Orthostatic VS  02-14-22 @ 08:07  Lying BP: --/-- HR: --  Sitting BP: 135/84 HR: 75  Standing BP: --/-- HR: --  Site: --  Mode: --  Orthostatic VS  02-13-22 @ 07:54  Lying BP: --/-- HR: --  Sitting BP: 144/77 HR: 91  Standing BP: --/-- HR: --  Site: --  Mode: --

## 2022-02-14 NOTE — BH INPATIENT PSYCHIATRY DISCHARGE NOTE - NSICDXPASTMEDICALHX_GEN_ALL_CORE_FT
PAST MEDICAL HISTORY:  Admits to alcohol use      PAST MEDICAL HISTORY:  Alcohol use disorder, severe, dependence

## 2022-02-14 NOTE — BH INPATIENT PSYCHIATRY DISCHARGE NOTE - HOSPITAL COURSE
Pt initially presented with symptoms of psychosis (disorganized speech, disorganized behavior, auditory hallucinations, irritability) and substance use (reportedly drinking large quantities of alcohol, utox + for benzos) in the context of life stressors and lack of outpatient treatment.    Pt was started on PO Abilify and uptitrated to 30mg then converted to an KIRK (Aristada 882mg - next dose due 2/27/22) along with Effexor with uptitration to 225mg with good response. Pt started on standing Atarax 50mg BID for anxiety with good response. While uptitrating medications, pt's psychosis improved and his presentation resembled MDD with psychosis, with low mood, hopelessness, conditional suicidal ideation at the thought of not having a home on d/c, and paranoia. Pt continued to have lingering depression and paranoia with little improvement on medication trial, so acute trial of ECT was started, first dose 1/28/22 with 8 total treatments while being inpatient.     Over hospitalization psychosis resolved (mood became stable, sleep improved, thought process became linear, no longer having hallucinations). Lingering depression and paranoia persisted until pt started on ECT, with stepwise improvement. By the end of his day his last day, pt no longer endorsed low mood, paranoia, or anxiety. Pt's plans for the future also became hopeful, with pt. excited and motivated for rehab, excited to see his family, and motivated to remain sober.     At the start of hospitalization, pt was intermittently irritable and isolative in room, not attending groups. By the end of hospitalization pt was very pleasant and in very good behavioral control, attending groups, being interactive in the unit milieu and being more interactive with his peers. He never became agitated, aggressive, or violent. He tended to ADLs independently and appropriately. He consistently denied SIIP and HIIP, although previously endorsing conditional SI when thinking about returning to living on the streets. He exhibited good appetite and energy. Pt's sister was involved with treatment and very supportive.    By the end of the day, pt was no longer symptomatic and appearing to be at his baseline and no longer presents as an acute risk of harm to self or others (and is therefore appropriate for discharge at inpatient rehab).    Pt was discharged with 30-day supply of Atarax 50mg BID, Effexor 225mg QD, and melatonin 3mg QHs. Next Aristada 882mgIM due 2/27/22 (to be administered [id number]).   Pt initially presented with symptoms of psychosis (disorganized speech, disorganized behavior, auditory hallucinations, irritability) and substance use (reportedly drinking large quantities of alcohol, utox + for benzos) in the context of lack of outpatient treatment and homelessness.    Pt was started on PO Abilify and uptitrated to 30mg then converted to an KIRK (Aristada 882mg - next dose due 2/27/22) along with Effexor with uptitration to 225mg with good response. Pt started on standing Atarax 50mg BID for anxiety with good response. While uptitrating medications, pt's psychosis improved and his presentation resembled MDD with psychosis, with low mood, hopelessness, conditional suicidal ideation at the thought of not having a home on d/c, and paranoia. Pt continued to have lingering depression and paranoia with little improvement on medication trial, so acute trial of ECT was started, first dose 1/28/22 with 8 total treatments while being inpatient.     Over hospitalization psychosis resolved (mood became stable, sleep improved, thought process became linear, no longer having hallucinations). Lingering depression and paranoia persisted until pt started on ECT, with stepwise improvement. By the end of his day his last day, pt no longer endorsed low mood, paranoia, or anxiety. Pt's plans for the future also became hopeful, with pt. excited and motivated for rehab, excited to see his family, and motivated to remain sober.     At the start of hospitalization, pt was intermittently irritable and isolative in room, not attending groups. By the end of hospitalization pt was very pleasant and in very good behavioral control, attending groups, being interactive in the unit milieu and being more interactive with his peers. He never became agitated, aggressive, or violent. He tended to ADLs independently and appropriately. He consistently denied SIIP and HIIP, although previously endorsing conditional SI when thinking about returning to living on the streets. He exhibited good appetite and energy. Pt's sister was involved with treatment and very supportive.    By the end of the day, pt was no longer symptomatic and appearing to be at his baseline and no longer presents as an acute risk of harm to self or others (and is therefore appropriate for discharge at inpatient rehab).    Pt was discharged with 30-day supply of Atarax 50mg BID, Effexor 225mg QD, and melatonin 3mg QHs. Next Aristada 882mgIM due 2/27/22 (to be administered [id number]).   Pt initially presented with depressed mood, passive SI, report of AH and other paranoia (in the realm of persecutory delusions), poor self-care, and agitation/irritability (pt initially minimally engaged/cooperative with interview and evaluation, typically angry and shouting expletives at treatment team, was sexually inappropriate towards certain staff members, at times threatening; though of note never aggressive or violent) in the context of EtOH and polysubstance (including K2) abuse, homelessness x1.5 years, and lack of outpt treatment including non-adherence to medication for at least one year. Of note, prior to transfer to [facility] pt had been medically admitted to [facility] for approximately two weeks for EtOH detox (completed CIWA protocol with Ativan taper) and work-up of altered mental status which had been unremarkable. Pt initially remained with altered mental status at [facility] and treatment team considered possible dx of Wernicke’s encephalopathy (though pt had been given IV thiamine at [facility] and remained on PO thiamine on admission). While altered, pt also primarily presented as psychotic and dx initially believed to be that of primary psychosis (schizophrenia vs schizoaffective d/o) though as mental status improved pt noted to be primarily depressed with ongoing paranoia and dx determined to be most likely MDD with psychosis.    On admission pt was initially started on Zyprexa, titrated to 10mg BID, though this was ultimately switched to Abilify given pt’s report of Abilify being effective for paranoia and AH in the past and wish to administer an KIRK (due to hx of non-adherence). Abilify to titrated to 30mg daily and then switched to KIRK Aristada 882mg (with loading dose of Aristada Initio), to be administered [id number] (last administered 1/27/22, next due 2/27/22). Pt was also initially started on Depakote DR 500mg BID, though this was later discontinued due to unclear need/indication and wish to minimize polypharmacy. Pt was also initially started on Klonopin 1mg BID for psychotic agitation, which was tapered and discontinued over hospitalization as symptoms and behavior improved. Later in hospital course pt was started on Effexor for depression, which was titrated to 225mg daily. Pt was also started on Atarax 50mg BID for ongoing severe anxiety. Mood and psychosis improved somewhat on this regimen of meds (Aristada, Effexor, Atarax), however overall pt remained quite depressed with ongoing passive SI and ongoing psychotic symptoms (AH, often with belief that staff and patients were talking negatively about him and wished to harm him, belief that he would be kicked out of the hospital, at times suggested that the air or food was poisoned). Pt also was often preoccupied with various somatic symptoms (including feeling “off,” headache, abd pain, heart palpitations, chest pain, dizziness, etc) despite unremarkable physical exam and medical work-up; such symptoms were deemed most likely to be a manifestation of pt’s anxiety. Given the above ongoing symptoms, ECT was pursued and pt underwent acute course of ECT (3x/week) and received a total of 8 treatments.    With ECT, pt’s symptoms improved dramatically. Psychosis resolved and mood improved significantly (pt was visibly brighter, noted smiling and laughing). Pt became much less isolative (initially spending all of his time in bed) and began to attend groups on the unit, was social with peers. Suicidal thoughts also resolved and pt started consistently denying S/I/I/P. He became much more hopeful about the future and motivated to continue treatment for substance use at rehab. Self care also improved over hospitalization and pt was better able to tend to ADLs independently.    During hospitalization pt’s nephew and mother were involved with treatment and very supportive (and pt consistently identified them as being protective against suicide and as a source of hope). Pt was compliant with all meds. He consistently denied HIIP. He also denied having cravings for any substances.    Pt was discharged with 30-day supply of Atarax 50mg BID, Effexor 225mg QD, and melatonin 3mg QHs. Next Aristada 882mgIM due 2/27/22 (to be administered [id number]).   Pt initially presented with depressed mood, passive SI, report of AH and other paranoia (in the realm of persecutory delusions), poor self-care, and agitation/irritability (pt initially minimally engaged/cooperative with interview and evaluation, typically angry and shouting expletives at treatment team, was sexually inappropriate towards certain staff members, at times threatening; though of note never aggressive or violent) in the context of EtOH and polysubstance (including K2) abuse, homelessness x1.5 years, and lack of outpt treatment including non-adherence to medication for at least one year. Of note, prior to transfer to [facility] pt had been medically admitted to [facility] for approximately two weeks for EtOH detox (completed CIWA protocol with Ativan taper) and work-up of altered mental status which had been unremarkable. Pt initially remained with altered mental status at [facility] and treatment team considered possible dx of Wernicke’s encephalopathy (though pt had been given IV thiamine at [facility] and remained on PO thiamine on admission). While altered, pt also primarily presented as psychotic and dx initially believed to be that of primary psychosis (schizophrenia vs schizoaffective d/o) though as mental status improved pt noted to be primarily depressed with ongoing paranoia and dx determined to be most likely MDD with psychosis.    On admission pt was initially started on Zyprexa, titrated to 10mg BID, though this was ultimately switched to Abilify given pt’s report of Abilify being effective for paranoia and AH in the past and wish to administer an KIRK (due to hx of non-adherence). Abilify to titrated to 30mg daily and then switched to KIRK Aristada 882mg (with loading dose of Aristada Initio), to be administered [id number] (last administered 1/27/22, next due 2/27/22). Pt was also initially started on Depakote DR 500mg BID, though this was later discontinued due to unclear need/indication and wish to minimize polypharmacy. Pt was also initially started on Klonopin 1mg BID for psychotic agitation, which was tapered and discontinued over hospitalization as symptoms and behavior improved. Later in hospital course pt was started on Effexor for depression, which was titrated to 225mg daily. Pt was also started on Atarax 50mg BID for ongoing severe anxiety. Mood and psychosis improved somewhat on this regimen of meds (Aristada, Effexor, Atarax), however overall pt remained quite depressed with ongoing passive SI and ongoing psychotic symptoms (AH, often with belief that staff and patients were talking negatively about him and wished to harm him, belief that he would be kicked out of the hospital, at times suggested that the air or food was poisoned). Pt also was often preoccupied with various somatic symptoms (including feeling “off,” headache, abd pain, heart palpitations, chest pain, dizziness, etc) despite unremarkable physical exam and medical work-up; such symptoms were deemed most likely to be a manifestation of pt’s anxiety. Given the above ongoing symptoms, ECT was pursued and pt underwent acute course of ECT (3x/week) and received a total of 8 treatments.    With ECT, pt’s symptoms improved dramatically. Psychosis resolved and mood improved significantly (pt was visibly brighter, noted smiling and laughing). Pt became much less isolative (initially spending all of his time in bed) and began to attend groups on the unit, was social with peers. Suicidal thoughts also resolved and pt started consistently denying S/I/I/P. He became much more hopeful about the future and motivated to continue treatment for substance use at rehab. Self care also improved over hospitalization and pt was better able to tend to ADLs independently.    During hospitalization pt’s nephew and mother were involved with treatment and very supportive (and pt consistently identified them as being protective against suicide and as a source of hope). Pt was compliant with all meds. He consistently denied HIIP. He also denied having cravings for any substances.    Pt is no longer at acute risk of harm to self or others. Acute risk factors include recent depression with psychosis, now treated with inpatient hospitalization. Other protective factors that mitigate acute risks include pt has consistently denied S/I/I/P and H/I/I/P, he has been in good behavioral control during hospitalization, he is compliant with meds, he is on an KIRK (to address potential for non-adherence to meds), he has strong social support of mother and nephew, he is hopeful and future oriented (looking forward to inpatient rehab), and he is able to engage in safety planning. Pt agreeable to contact outpt providers, call 911, or go to the nearest ED with any imminent safety concerns for self or others.    Pt was discharged with 30-day supply of Atarax 50mg BID, Effexor 225mg QD, and melatonin 3mg QHs. Next Aristada 882mgIM due 2/27/22 (to be administered [id number]).   Pt initially presented with depressed mood, passive SI, report of AH and other paranoia (in the realm of persecutory delusions), poor self-care, and agitation/irritability (pt initially minimally engaged/cooperative with interview and evaluation, typically angry and shouting expletives at treatment team, was sexually inappropriate towards certain staff members, at times threatening; though of note never aggressive or violent) in the context of EtOH and polysubstance (including K2) abuse, homelessness x1.5 years, and lack of outpt treatment including non-adherence to medication for at least one year. Of note, prior to transfer to [facility] pt had been medically admitted to [facility] for approximately two weeks for EtOH detox (completed CIWA protocol with Ativan taper) and work-up of altered mental status which had been unremarkable. Pt initially remained with altered mental status at [facility] and treatment team considered possible dx of Wernicke’s encephalopathy (though pt had been given IV thiamine at [facility] and remained on PO thiamine on admission). While altered, pt also primarily presented as psychotic and dx initially believed to be that of primary psychosis (schizophrenia vs schizoaffective d/o) though as mental status improved pt noted to be primarily depressed with ongoing paranoia and dx determined to be most likely MDD with psychosis.    On admission pt was initially started on Zyprexa, titrated to 10mg BID, though this was ultimately switched to Abilify given pt’s report of Abilify being effective for paranoia and AH in the past and wish to administer an KIRK (due to hx of non-adherence). Abilify to titrated to 30mg daily and then switched to KIRK Aristada 882mg (with loading dose of Aristada Initio), to be administered [id number] (last administered 1/27/22, next due 2/27/22). Pt was also initially started on Depakote DR 500mg BID, though this was later discontinued due to unclear need/indication and wish to minimize polypharmacy. Pt was also initially started on Klonopin 1mg BID for psychotic agitation, which was tapered and discontinued over hospitalization as symptoms and behavior improved. Later in hospital course pt was started on Effexor for depression, which was titrated to 225mg daily. Pt was also started on Atarax 50mg BID for ongoing severe anxiety. Mood and psychosis improved somewhat on this regimen of meds (Aristada, Effexor, Atarax), however overall pt remained quite depressed with ongoing passive SI and ongoing psychotic symptoms (A/H, often with belief that staff and patients were talking negatively about him and wished to harm him, belief that he would be kicked out of the hospital, at times suggested that the air or food was poisoned). Pt also was often preoccupied with various somatic symptoms (including feeling “off,” headache, abd pain, heart palpitations, chest pain, dizziness, etc) despite unremarkable physical exam and medical work-up; such symptoms were deemed most likely to be a manifestation of pt’s anxiety. Given the above ongoing symptoms, ECT was pursued and pt underwent acute course of ECT (3x/week) and received a total of 8 treatments.    With ECT, pt’s symptoms improved dramatically. Psychosis resolved and mood improved significantly (pt was visibly brighter, noted to be smiling and laughing). Pt became much less isolative (initially spending all of his time in bed) and began to attend groups on the unit, was social with peers. Suicidal thoughts also resolved and pt started consistently denying S/I/I/P. He became much more hopeful about the future and motivated to continue treatment for substance use at rehab. Self care also improved over hospitalization and pt was better able to tend to ADLs independently.    During hospitalization pt’s nephew and mother were involved with treatment and very supportive (and pt consistently identified them as being protective against suicide and as a source of hope). Pt was compliant with all meds. He consistently denied HIIP. He also denied having cravings for any substances.    Pt is no longer at acute risk of harm to self or others. Acute risk factors include recent depression (including SI) with psychosis, now treated with inpatient hospitalization. Other protective factors that mitigate acute risks include pt has consistently denied S/I/I/P and H/I/I/P, he has been in good behavioral control during hospitalization, he is compliant with meds, he is on an KIRK (to address potential for non-adherence to meds), he has strong social support of mother and nephew, he is hopeful and future oriented (looking forward to inpatient rehab), and he is able to engage in safety planning. Pt agreeable to contact outpt providers, call 911, or go to the nearest ED with any imminent safety concerns for self or others.    Pt was discharged with 30-day supply of Atarax 50mg BID, Effexor 225mg QD, and melatonin 3mg QHs. Next Aristada 882mgIM due 2/27/22 (to be administered [id number]; if rehab is unable to administer KIRK he should start PO Abilify 30mg daily on 2/27/22 instead).

## 2022-02-14 NOTE — BH INPATIENT PSYCHIATRY PROGRESS NOTE - MSE UNSTRUCTURED FT
Patient is a 50yo man with fair grooming and hygiene. Patient is cooperative, calm, pleasant, and smiling. Makes good eye contact. No psychomotor abnormalities noted. Speech is normal in rate, rhythm, and volume. Stated mood is "good" and "excited." Affect is euthymic and reactive, congruent with mood. Thought process is linear and goal-directed. Thought content: denies S/I/I/P, denies paranoia, no evidence of delusions. No perceptual disturbances noted. Insight and judgment are good. Impulse control is intact.     MMSE completed 1/27/22 - 29/30

## 2022-02-14 NOTE — ECT PRE-PROCEDURE CHECKLIST - DOES PATIENT HAVE ADVANCE DIRECTIVE
No
PT does not know
PT does not know/No
PT does not know
PT does not know/No

## 2022-02-14 NOTE — BH INPATIENT PSYCHIATRY DISCHARGE NOTE - NSACTIVEVENT_PSY_ALL_CORE
Triggering events leading to humiliation, shame, and/or despair (e.g., Loss of relationship, financial or health status) (real or anticipated)/Substance intoxication or withdrawal/Pending incarceration or homelessness/Perceived burden on family or others

## 2022-02-14 NOTE — ECT TREATMENT NOTE - NSECTNEUROCOMMENT_PSY_ALL_CORE
c-spine plate implantation

## 2022-02-14 NOTE — BH INPATIENT PSYCHIATRY DISCHARGE NOTE - NSBHMETABOLIC_PSY_ALL_CORE_FT
BMI: BMI (kg/m2): 33.2 (02-09-22 @ 13:11)  HbA1c: A1C with Estimated Average Glucose Result: 5.4 % (12-21-21 @ 09:46)    Glucose: POCT Blood Glucose.: 95 mg/dL (11-30-21 @ 05:53)    BP: 150/90 (02-14-22 @ 14:12) (150/90 - 150/90)  Lipid Panel: Date/Time: 12-21-21 @ 09:46  Cholesterol, Serum: 148  Direct LDL: --  HDL Cholesterol, Serum: 40  Total Cholesterol/HDL Ration Measurement: --  Triglycerides, Serum: 55
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
## 2022-02-14 NOTE — BH INPATIENT PSYCHIATRY PROGRESS NOTE - MODIFICATIONS
as below
none
as below

## 2022-02-14 NOTE — ECT PRE-PROCEDURE CHECKLIST - PATIENT PROBLEMS/NEEDS
Patient expressed no known problems or needs

## 2022-02-14 NOTE — BH INPATIENT PSYCHIATRY DISCHARGE NOTE - HPI (INCLUDE ILLNESS QUALITY, SEVERITY, DURATION, TIMING, CONTEXT, MODIFYING FACTORS, ASSOCIATED SIGNS AND SYMPTOMS)
Patient was seen and evaluated, chart reviewed. Case discussed with nursing team.  On service for this 52yo male, patient unable to elaborate on PPHx or PMHx, presented with alcohol use.   Patient is hospitalized with a primary problem of disorganization, agitation possible in context of alcohol use.  Patient admitted to [facility] on a 9.27 legal status. I have reviewed the initial psychiatric assessment in the electronic medical record, including the history of present illness, past psychiatric history, family/social history (no pertinent changes), and exam, and have confirmed the salient findings dated 12/10/21.  As per chart review, transferring records indicated the following:  Pt is a 52yo M, presented to [facility] earlier today and reported to have had an bottle of alcohol and presented with head laceration and after repair, asked for place to stay due to cold weather, and subsequently reportedly brought to [facility] after AMA, consult called today for reported schizophrenia. On assessment, pt unable to meaningfully interact, reports name is 'Adam Franklin', or other name, reports he is at [place], reports he drank 'everything', unable to answer further questions. Pt unable to follow directions to sit up in bed, cover his genitals with gown or sheet.    On unit:  Information Received From: Chart review and patient interview  Initial interview, chart, medications and admission labs reviewed, with no acute findings, +u-tox benzo.  Patient is discussed with nursing staff. Per nursing report patient remains compliant with all standing medications and tolerating well. Patient remains in good behavioral control, there has been no aggression, no prns for aggression.  No significant overnight issues.  Patient is observed in room, he  is notably irritable, bizarre upon approach. When questioned about his mood pt repeatedly mumbles something under his breath, writer asked  patient to repeat what he said, pt states “I’m saying dumb shit”  Patient states voices are “talking to me.”  Patient is asked the content of the voices, pt replies “voices tell me to fuck you”  “I want to fuck”  Patient then started to touch himself under the blankets.  Patient repeatedly stating explicit statements, each time pitch/volume in voice increased.  Patient then stated he wants to go to bathroom.   Interview was challenging/limited due to patient’s aggression,  disorganization, and sexual inappropriateness.    Patient did report AH in ED.  He appears internally disturbed, paranoia, delusions. Patient denies any symptoms suggestive of rodrigo: (denied grandiosity/ racing thoughts/ increased goal directed activities or engaged in risk taking behavior/ no pressured speech/ no elevated mood/ denied any increased in energy level causing sleep disruption). Denies depressed mood or SI/HI. Denies trauma history, denies any current legal issues. History of ETOH, completed an ativan taper prior to admission at [facility], and is now currently not in withdrawal.     Pt is a 52yo male, undomiciled, unemployed, PPH of multiple substance use, who presented to the ED with disorganized behavior, intoxicated, and paranoid.     On assessment from consult team, pt unable to meaningfully interact, reports name is 'Adam Franklin', or other name, reports he is at [place], reports he drank 'everything', unable to answer further questions. Pt unable to follow directions to sit up in bed, cover his genitals with gown or sheet. Per initial assessment, patient was found to have positive u-tox for benzos but overall has been in good behavioral control on the floor with no aggression, PRNs, or significant overnight issues. Pt. is seen in room and is irritable and bizarre upon approach. When questioned about his mood pt repeatedly mumbles something under his breath, writer asked patient to repeat what he said, pt states “I’m saying dumb shit.” Patient states voices are “talking to me.” Patient is asked the content of the voices, pt replies “voices tell me to fuck you” and “I want to fuck.” Patient then started to touch himself under the blankets. Patient repeatedly stating explicit statements, each time pitch/volume in voice increased. Patient then stated he wants to go to bathroom. Interview was challenging/limited due to patient’s aggression,  disorganization, and sexual inappropriateness.      Patient did report AH in ED. He appears internally preoccupied, paranoid, and with baljeet delusions. Patient denies any symptoms suggestive of rodrigo: (denied grandiosity/ racing thoughts/ increased goal directed activities or engaged in risk taking behavior/ no pressured speech/ no elevated mood/ denied any increased in energy level causing sleep disruption). Denies depressed mood or SI/HI. Denies trauma history, denies any current legal issues. History of ETOH, completed an ativan taper prior to admission at [facility], and is now currently not in withdrawal.     Pt is a 50yo male, undomiciled, unemployed, PPH of multiple substance use, who presented to the ED with disorganized behavior, intoxicated, and paranoid.     On assessment from consult team, pt unable to meaningfully interact, reports name is 'Adam Franklin', or other name, reports he is at [place], reports he drank 'everything', unable to answer further questions. Pt unable to follow directions to sit up in bed. Per initial assessment, patient was found to have positive u-tox for benzos but overall has been in good behavioral control on the floor with no aggression, PRNs, or significant overnight issues. Pt. is seen in room and is irritable and bizarre upon approach. When questioned about his mood pt repeatedly mumbles something under his breath, writer asked patient to repeat what he said, pt states “I’m saying dumb shit.” Patient states voices are “talking to me.” Patient is asked the content of the voices, pt replies “voices tell me to fuck you” and “I want to fuck.” Patient then started to touch himself under the blankets. Patient repeatedly stating explicit statements, each time pitch/volume in voice increased. Patient then stated he wants to go to bathroom. Interview was challenging/limited due to patient’s aggression,  disorganization, and sexual inappropriateness.      Patient did report AH in ED. He appears internally preoccupied, paranoid, and with baljeet delusions. Patient denies any symptoms suggestive of rodrigo: (denied grandiosity/ racing thoughts/ increased goal directed activities or engaged in risk taking behavior/ no pressured speech/ no elevated mood/ denied any increased in energy level causing sleep disruption). Denies depressed mood or SI/HI. Denies trauma history, denies any current legal issues. History of ETOH, completed an ativan taper prior to admission at [facility], and is now currently not in withdrawal.

## 2022-02-14 NOTE — BH INPATIENT PSYCHIATRY DISCHARGE NOTE - NSDCMRMEDTOKEN_GEN_ALL_CORE_FT
Aristada 882 mg/3.2 mL intramuscular suspension, extended release: 1 application intramuscular every 4 weeks; next dose due 2/27/22  ferrous sulfate 325 mg (65 mg elemental iron) oral tablet: 1 tab(s) orally once a day  hydrOXYzine hydrochloride 50 mg oral tablet: 1 tab(s) orally 2 times a day  melatonin 3 mg oral tablet: 1 tab(s) orally once a day (at bedtime), As needed, Insomnia  venlafaxine 75 mg oral capsule, extended release: 3 cap(s) orally once a day

## 2022-02-14 NOTE — BH INPATIENT PSYCHIATRY DISCHARGE NOTE - NSDCCPCAREPLAN_GEN_ALL_CORE_FT
PRINCIPAL DISCHARGE DIAGNOSIS  Diagnosis: Severe recurrent major depressive disorder with psychotic features  Assessment and Plan of Treatment:       SECONDARY DISCHARGE DIAGNOSES  Diagnosis: Severe alcohol use disorder  Assessment and Plan of Treatment:

## 2022-02-14 NOTE — ECT TREATMENT NOTE - NSICDXBHPRIMARYDX_PSY_ALL_CORE
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  

## 2022-02-14 NOTE — ECT PRE-PROCEDURE CHECKLIST - NSADULTACCOMPRELATION_PSY_ALL_CORE
other (specify)

## 2022-02-14 NOTE — BH INPATIENT PSYCHIATRY DISCHARGE NOTE - CASE SUMMARY
Pt is a 50yo man who initially presented with depressed mood, passive SI, report of AH and other paranoia (in the realm of persecutory delusions), poor self-care, and agitation/irritability in the context of EtOH and polysubstance abuse, homelessness x1.5 years, and lack of outpt treatment including non-adherence to medication for at least one year. Pt arrived s/p unremarkable work-up at [facility] for altered mental status and EtOH detox. Pt initially believed to have primary psychotic illness, though as mental status improved, it became clear that most likely dx was MDD with psychotic features. Pt was initially started on Zyprexa, Depakote, and Klonopin, however these meds were later discontinued in favor of treatment with Abilify (switched to KIRK Aristada), Effexor, and Atarax. Pt showed some improvement in mood and psychosis on these meds, though ultimately required acute course of ECT (8 treatments, last administered 2/14/22). With ECT pt exhibited dramatic improvement in mood and resolution of psychosis. Affect became much brighter and pt became much more interactive in unit milieu/less isolative. Self-care improved and pt was able to tend to ADLs independently. He became hopeful about his future and motivated to continue his treatment for substance abuse at rehab. Suicidal thinking resolved and he consistently denied SI/I/P. Behavior also improved significantly over hospitalization (also partly related improved mental status); he became much more cooperative and appropriate. Family was very involved with treatment and supportive. Though pt initially presented as irritable, agitated, and uncooperative, he was never aggressive or violent.     Pt’s depressive and psychotic symptoms have improved considerably over the course of hospitalization. The patient continues to report absence of thoughts of self-harm, suicide, aggression, or homicide. He is compliant with medication and is in appropriate behavioral control. Although some residual symptoms remain, these are not impacting behavior and are not distressing. Given the above, patient does not appear to constitute an imminent threat to self or others and is appropriate for discharge.  Patient instructed to contact outpt providers, call 911, or go to nearest emergency room with any thoughts of self-harm, suicide, aggression, or homicide. He expressed understanding of these emergency procedures and is in agreement with plan.

## 2022-02-14 NOTE — BH INPATIENT PSYCHIATRY PROGRESS NOTE - NSBHMETABOLIC_PSY_ALL_CORE_FT
BMI: BMI (kg/m2): 33.2 (02-09-22 @ 13:11)  HbA1c: A1C with Estimated Average Glucose Result: 5.4 % (12-21-21 @ 09:46)    Glucose: POCT Blood Glucose.: 95 mg/dL (11-30-21 @ 05:53)    BP: 151/90 (02-11-22 @ 13:10) (148/88 - 163/93)  Lipid Panel: Date/Time: 12-21-21 @ 09:46  Cholesterol, Serum: 148  Direct LDL: --  HDL Cholesterol, Serum: 40  Total Cholesterol/HDL Ration Measurement: --  Triglycerides, Serum: 55   BMI: BMI (kg/m2): 33.2 (02-09-22 @ 13:11)  HbA1c: A1C with Estimated Average Glucose Result: 5.4 % (12-21-21 @ 09:46)    Glucose: POCT Blood Glucose.: 95 mg/dL (11-30-21 @ 05:53)    BP: 141/91 (02-14-22 @ 15:10) (141/91 - 158/80)  Lipid Panel: Date/Time: 12-21-21 @ 09:46  Cholesterol, Serum: 148  Direct LDL: --  HDL Cholesterol, Serum: 40  Total Cholesterol/HDL Ration Measurement: --  Triglycerides, Serum: 55

## 2022-02-14 NOTE — ECT TREATMENT NOTE - NSECTCPTCODE_PSY_ALL_CORE
15539 (ECT-Electroconvulsive Therapy)
90623 (ECT-Electroconvulsive Therapy)
47666 (ECT-Electroconvulsive Therapy)
22250 (ECT-Electroconvulsive Therapy)
89503 (ECT-Electroconvulsive Therapy)
66352 (ECT-Electroconvulsive Therapy)
50000 (ECT-Electroconvulsive Therapy)
45858 (ECT-Electroconvulsive Therapy)

## 2022-02-14 NOTE — BH INPATIENT PSYCHIATRY DISCHARGE NOTE - NSBHDCRISKMITIGATE_PSY_ALL_CORE
Safety planning/Long acting injectable medication/Other Safety planning/Family/Other social support involvement/Long acting injectable medication/Other

## 2022-02-14 NOTE — ECT PRE-PROCEDURE CHECKLIST - AS BP NONINV SITE
left upper arm
left upper arm
right upper arm
left upper arm
right upper arm
left upper arm
left upper arm

## 2022-02-14 NOTE — ECT TREATMENT NOTE - NSECTPOSTTXSUMMARY_PSY_ALL_CORE
The patient had a well modified grand mal seizure under general anesthesia and muscle relaxant. The patient is alert, responsive, in no acute distress.  Recovery uneventful.

## 2022-02-14 NOTE — ECT TREATMENT NOTE - NSECTTXPERFDATETIME_PSY_ALL_CORE
11-Feb-2022 12:32
31-Jan-2022 11:19
14-Feb-2022 14:13
02-Feb-2022 15:39
28-Jan-2022 12:15
04-Feb-2022 11:49
09-Feb-2022 13:38
07-Feb-2022 12:12

## 2022-02-14 NOTE — ECT PRE-PROCEDURE CHECKLIST - LAST TOOK
meds with sips of water/clears
meds with sips of water/clears
clears
clears
meds with sips of water this AM/clears
solids
solids
clears

## 2022-02-14 NOTE — BH INPATIENT PSYCHIATRY PROGRESS NOTE - NSBHFUPINTERVALHXFT_PSY_A_CORE
No acute events overnight. Pt compliant with meds, slept well, remains in good behavioral control. Today appears bright, says he is feeling "good." He is happy with his improvement, saying he's excited to see his family tomorrow and go to rehab. Denies SIIP. No longer paranoid. Denies A/H as well. Remains more engaged in unit milieu and attending groups, watched Super Bowl last night. Tolerating ECT well with minimal memory impairment. Feeling hopeful about the future.

## 2022-02-14 NOTE — BH INPATIENT PSYCHIATRY DISCHARGE NOTE - DESCRIPTION
Pt was previously undomiciled prior to hospitalization. Has family (mother and nephew) who he will live with after inpatient rehab. Unemployed.

## 2022-02-14 NOTE — BH INPATIENT PSYCHIATRY PROGRESS NOTE - NSBHATTESTNPTRAINEE_PSY_A_CORE
agree

## 2022-02-14 NOTE — ECT TREATMENT NOTE - NSECTOTHERCOMMENT_PSY_ALL_CORE
COVID19 POSITIVE 11/29/2021

## 2022-02-14 NOTE — ECT PRE-PROCEDURE CHECKLIST - NSPROEDALEARNPREF_GEN_A_NUR
computer/internet/skill demonstration/video

## 2022-02-14 NOTE — ECT PRE-PROCEDURE CHECKLIST - NSECTCONSENT_PSY_ALL_CORE
ECT consent dated ACUTE 1/27/22  Anesthesia consent expires 04/28/22/yes
ECT consent dated  Anesthesia consent expires 04/28/22
ECT consent dated ACUTE 1/27/22  Anesthesia consent expires 04/28/22/yes

## 2022-02-15 VITALS — TEMPERATURE: 98 F

## 2022-02-15 PROCEDURE — 99239 HOSP IP/OBS DSCHRG MGMT >30: CPT

## 2022-02-15 NOTE — BH INPATIENT PSYCHIATRY PROGRESS NOTE - NSTXDCHOUSDATEEST_PSY_ALL_CORE
27-Dec-2021
10-Jose-2022
24-Jan-2022
27-Dec-2021
10-Jose-2022
27-Dec-2021
27-Dec-2021
10-Jose-2022
13-Dec-2021
10-Jose-2022
27-Dec-2021
31-Jan-2022
07-Feb-2022
13-Dec-2021
31-Jan-2022
13-Dec-2021
24-Jan-2022
07-Feb-2022
31-Jan-2022
10-Jose-2022
24-Jan-2022
14-Feb-2022
10-Jose-2022
24-Jan-2022
07-Feb-2022
31-Jan-2022
10-Jose-2022
07-Feb-2022
10-Jose-2022
24-Jan-2022
10-Jose-2022
13-Dec-2021
14-Feb-2022
27-Dec-2021
07-Feb-2022
27-Dec-2021
13-Dec-2021
13-Dec-2021
27-Dec-2021
13-Dec-2021
27-Dec-2021

## 2022-02-15 NOTE — BH INPATIENT PSYCHIATRY PROGRESS NOTE - NSTXIMPULSPROGRES_PSY_ALL_CORE
Improving
Met - goal discontinued
Improving
anna marie  10th grade

## 2022-02-15 NOTE — BH INPATIENT PSYCHIATRY PROGRESS NOTE - NSTXPSYCHOPROGRES_PSY_ALL_CORE
Improving
No Change
No Change
Improving
No Change
Improving
Met - goal discontinued
Improving
Improving
No Change
Improving

## 2022-02-15 NOTE — BH INPATIENT PSYCHIATRY PROGRESS NOTE - NSBHMETABOLICLABS_PSY_ALL_CORE
Labs within last 12 months
Labs within last 12 months
Metabolic screening could not be completed due to the patient's enduring unstable medical/psychological condition
Metabolic screening could not be completed due to the patient's enduring unstable medical/psychological condition
Labs within last 12 months
Metabolic screening could not be completed due to the patient's enduring unstable medical/psychological condition
Metabolic screening could not be completed due to the patient's enduring unstable medical/psychological condition
Labs within last 12 months
Metabolic screening could not be completed due to the patient's enduring unstable medical/psychological condition
Labs within last 12 months
Metabolic screening could not be completed due to the patient's enduring unstable medical/psychological condition
Labs within last 12 months
Labs within last 12 months

## 2022-02-15 NOTE — BH INPATIENT PSYCHIATRY PROGRESS NOTE - NSTXPSYCHOGOAL_PSY_ALL_CORE
Will verbalize 1 strategy to successfully cope with psychotic symptoms

## 2022-02-15 NOTE — BH INPATIENT PSYCHIATRY PROGRESS NOTE - NSTXPSYCHODATETRGT_PSY_ALL_CORE
21-Jan-2022
30-Dec-2021
10-Feb-2022
17-Feb-2022
03-Feb-2022
28-Jan-2022
28-Jan-2022
23-Dec-2021
28-Jan-2022
14-Jan-2022
23-Dec-2021
07-Jan-2022
14-Jan-2022
03-Feb-2022
30-Dec-2021
07-Jan-2022
21-Jan-2022
07-Jan-2022
10-Feb-2022
17-Feb-2022
10-Feb-2022
03-Feb-2022
03-Feb-2022
23-Dec-2021
10-Feb-2022
21-Jan-2022
23-Dec-2021
30-Dec-2021
21-Jan-2022
23-Dec-2021
17-Feb-2022
21-Jan-2022
14-Jan-2022
28-Jan-2022
03-Feb-2022
10-Feb-2022
17-Feb-2022
07-Jan-2022
14-Jan-2022
30-Dec-2021
07-Jan-2022

## 2022-02-15 NOTE — BH INPATIENT PSYCHIATRY PROGRESS NOTE - NSBHINPTBILLING_PSY_ALL_CORE
57691 - Inpatient Moderate Complexity
51630 - Inpatient Moderate Complexity
95853 - Inpatient Moderate Complexity
70987 - Inpatient Moderate Complexity
43257 - Inpatient Moderate Complexity
86185 - Inpatient Moderate Complexity
60326 - Inpatient Moderate Complexity
30391 - Inpatient Moderate Complexity
86705 - Inpatient Moderate Complexity
04290 - Inpatient High Complexity
93316 - Inpatient Moderate Complexity
77076 - Inpatient Moderate Complexity
89643 - Hospital Discharge Day Management; more than 30 min
16895 - Inpatient Moderate Complexity
45063 - Inpatient Moderate Complexity
65406 - Inpatient Moderate Complexity
12661 - Inpatient Moderate Complexity
37383 - Inpatient Moderate Complexity
07988 - Inpatient Moderate Complexity
20078 - Inpatient Moderate Complexity
18339 - Inpatient Moderate Complexity
04951 - Inpatient Moderate Complexity
20565 - Inpatient Moderate Complexity
88877 - Inpatient Moderate Complexity
24236 - Inpatient Moderate Complexity
45891 - Inpatient Moderate Complexity
30715 - Inpatient Moderate Complexity
60765 - Inpatient Moderate Complexity
04171 - Inpatient Moderate Complexity
88737 - Inpatient Moderate Complexity
93765 - Inpatient Moderate Complexity
18996 - Inpatient Moderate Complexity
37400 - Inpatient High Complexity
41563 - Inpatient Moderate Complexity
60679 - Inpatient Moderate Complexity
46423 - Inpatient Moderate Complexity
86445 - Inpatient Moderate Complexity
42736 - Inpatient Moderate Complexity
50299 - Inpatient Moderate Complexity
61591 - Inpatient Moderate Complexity
42768 - Inpatient Moderate Complexity
97073 - Inpatient Moderate Complexity
39038 - Inpatient Moderate Complexity
84528 - Inpatient Moderate Complexity
92197 - Inpatient Moderate Complexity

## 2022-02-15 NOTE — BH INPATIENT PSYCHIATRY PROGRESS NOTE - NSTXCOPEDATETRGT_PSY_ALL_CORE
06-Feb-2022
30-Jan-2022
16-Jan-2022
09-Jan-2022
23-Jan-2022
26-Dec-2021
26-Dec-2021
23-Jan-2022
26-Dec-2021
21-Jan-2022
02-Jan-2022
06-Feb-2022
13-Feb-2022
10-Feb-2022
13-Feb-2022
13-Feb-2022
16-Jan-2022
30-Jan-2022
06-Feb-2022
30-Jan-2022
06-Feb-2022
30-Jan-2022
13-Feb-2022
13-Feb-2022
23-Dec-2021
18-Dec-2021
02-Jan-2022
23-Jan-2022
09-Jan-2022
02-Jan-2022
18-Dec-2021
21-Jan-2022
20-Feb-2022
30-Jan-2022
18-Dec-2021
20-Feb-2022
30-Jan-2022
18-Dec-2021
26-Dec-2021
09-Jan-2022
23-Dec-2021
09-Jan-2022
02-Jan-2022
16-Jan-2022
09-Jan-2022

## 2022-02-15 NOTE — BH INPATIENT PSYCHIATRY PROGRESS NOTE - NSTXPSYCHOINTERMD_PSY_ALL_CORE
c/w Abilify and Depakote, goal for KIRK
c/w KIRK Aristada, Effexor, and ECT
switch from Zyprexa to Abilify with goal for KIRK, c/w Depakote
switch from Zyprexa to Abilify with goal for KIRK, c/w Depakote
c/w Abilify (now REAGAN) and Depakote
c/w KIRK Aristada and Depakote
switch from Zyprexa to Abilify with goal for KIRK, c/w Depakote
c/w KIRK Aristada and Depakote
c/w KIRK Aristada and Depakote
c/w KIRK Aristada and Depakote and starting ECT
switch from Zyprexa to Abilify with goal for KIRK, c/w Depakote
c/w KIRK Aristada and Depakote and starting ECT
c/w Abilify (now REAGAN) and Depakote
c/w KIRK Aristada and Depakote and starting ECT
c/w KIRK Aristada and Depakote
c/w Abilify and Depakote, goal for KIRK
c/w Abilify (now REAGAN) and Depakote
c/w KIRK Aristada and Depakote and starting ECT
c/w KIRK Aristada and Depakote and starting ECT
c/w KIRK Aristada, Effexor, and ECT
c/w KIRK Aristada and Depakote
switch from Zyprexa to Abilify with goal for KIRK, c/w Depakote
c/w Abilify and Depakote, goal for KIRK
c/w KIRK Aristada and Depakote
c/w KIRK Aristada, Effexor, and ECT
switch from Zyprexa to Abilify with goal for KIRK, c/w Depakote
c/w KIRK Aristada and Depakote
switch from Zyprexa to Abilify with goal for KIRK, c/w Depakote
c/w KIRK Aristada, Effexor, and ECT
c/w Abilify (now REAGAN) and Depakote
c/w Abilify and Depakote, goal for KIRK
c/w Abilify (now REAGAN) and Depakote
switch from Zyprexa to Abilify with goal for KIRK, c/w Depakote
switch from Zyprexa to Abilify with goal for KIRK, c/w Depakote

## 2022-02-15 NOTE — BH INPATIENT PSYCHIATRY PROGRESS NOTE - NSTXIMPULSDATETRGT_PSY_ALL_CORE
07-Jan-2022
10-Feb-2022
14-Jan-2022
17-Feb-2022
30-Dec-2021
03-Feb-2022
06-Jan-2022
28-Jan-2022
06-Jan-2022
21-Jan-2022
06-Jan-2022
03-Feb-2022
10-Feb-2022
30-Dec-2021
28-Jan-2022
17-Feb-2022
21-Jan-2022
03-Feb-2022
17-Feb-2022
21-Jan-2022
03-Feb-2022
28-Jan-2022
14-Jan-2022
21-Jan-2022
14-Jan-2022
28-Jan-2022
23-Dec-2021
30-Dec-2021
30-Dec-2021
03-Feb-2022
18-Dec-2021
23-Dec-2021
21-Jan-2022
23-Dec-2021
10-Feb-2022
18-Dec-2021
10-Feb-2022
18-Dec-2021
10-Feb-2022
17-Feb-2022
23-Dec-2021
18-Dec-2021
06-Jan-2022
23-Dec-2021
14-Jan-2022

## 2022-02-15 NOTE — BH INPATIENT PSYCHIATRY PROGRESS NOTE - NSTXCOPEPROGRES_PSY_ALL_CORE
Met - goal discontinued
Improving
No Change
Improving
Improving
No Change
Improving
Improving
No Change
Improving
No Change
Improving
No Change
No Change
Improving
Improving
No Change
No Change
Improving
No Change
Improving
No Change
Improving
No Change
Improving
No Change
No Change
Improving

## 2022-02-15 NOTE — BH INPATIENT PSYCHIATRY PROGRESS NOTE - NSTXPROBPSYCHO_PSY_ALL_CORE
PSYCHOTIC SYMPTOMS

## 2022-02-15 NOTE — BH INPATIENT PSYCHIATRY PROGRESS NOTE - NSTXPROBIMPULS_PSY_ALL_CORE
IMPULSIVITY/AGITATION

## 2022-02-15 NOTE — BH INPATIENT PSYCHIATRY PROGRESS NOTE - NS ED BHA REVIEW OF ED CHART VITAL SIGNS REVIEWED
Yes

## 2022-02-15 NOTE — BH INPATIENT PSYCHIATRY PROGRESS NOTE - NSTXDCHOUSPROGRES_PSY_ALL_CORE
Improving
No Change
No Change
Improving
Improving
No Change
Improving
No Change
Improving
No Change
Improving
No Change
No Change
Improving
No Change
Improving
Improving
Met - goal discontinued
No Change
Improving
Improving
No Change
Improving
No Change
Improving
No Change

## 2022-02-15 NOTE — BH INPATIENT PSYCHIATRY PROGRESS NOTE - NSTXECTGOAL_PSY_ALL_CORE
Maintain NPO status as indicated prior to procedure

## 2022-02-15 NOTE — BH INPATIENT PSYCHIATRY PROGRESS NOTE - NSTXDCOPLKGOAL_PSY_ALL_CORE
Will agree to consider an appropriate level of outpatient care
n/a
Will agree to consider an appropriate level of outpatient care

## 2022-02-15 NOTE — BH INPATIENT PSYCHIATRY PROGRESS NOTE - NSTXPSYCHODATEEST_PSY_ALL_CORE
23-Dec-2021
23-Dec-2021
07-Jan-2022
11-Dec-2021
23-Dec-2021
11-Dec-2021
07-Jan-2022
23-Dec-2021
23-Dec-2021
07-Jan-2022
11-Dec-2021
07-Jan-2022
23-Dec-2021
07-Jan-2022
07-Jan-2022
11-Dec-2021
07-Jan-2022
23-Dec-2021
07-Jan-2022
23-Dec-2021
23-Dec-2021
11-Dec-2021

## 2022-02-15 NOTE — BH INPATIENT PSYCHIATRY PROGRESS NOTE - NSTXCOPEGOAL_PSY_ALL_CORE
Identify and utilize 2 coping skills that meet their needs

## 2022-02-15 NOTE — BH INPATIENT PSYCHIATRY PROGRESS NOTE - NSTXDCHOUSDATETRGT_PSY_ALL_CORE
06-Jan-2022
31-Jan-2022
31-Jan-2022
07-Feb-2022
14-Feb-2022
17-Jan-2022
06-Jan-2022
17-Jan-2022
06-Jan-2022
07-Feb-2022
17-Jan-2022
20-Dec-2021
17-Jan-2022
14-Feb-2022
17-Jan-2022
31-Jan-2022
20-Dec-2021
17-Jan-2022
21-Feb-2022
20-Dec-2021
14-Feb-2022
31-Jan-2022
14-Feb-2022
20-Dec-2021
07-Feb-2022
20-Dec-2021
17-Jan-2022
14-Feb-2022
20-Dec-2021
07-Feb-2022
31-Jan-2022
21-Feb-2022
03-Jan-2022
06-Jan-2022
03-Jan-2022
20-Dec-2021
06-Jan-2022
03-Jan-2022
06-Jan-2022

## 2022-02-15 NOTE — BH INPATIENT PSYCHIATRY PROGRESS NOTE - MSE OPTIONS
Structured MSE
Unstructured MSE
Structured MSE
Unstructured MSE
Structured MSE
Unstructured MSE
Unstructured MSE
Structured MSE
Unstructured MSE
Structured MSE
Structured MSE
Unstructured MSE
Structured MSE
Unstructured MSE
Structured MSE
Unstructured MSE
Structured MSE
Structured MSE
Unstructured MSE
Structured MSE

## 2022-02-15 NOTE — BH INPATIENT PSYCHIATRY PROGRESS NOTE - NSDCCRITERIA_PSY_ALL_CORE
Symptom stabilization  CGI<=3

## 2022-02-15 NOTE — BH INPATIENT PSYCHIATRY PROGRESS NOTE - NSBHANTIPSYCHOTIC_PSY_ALL_CORE
Yes...

## 2022-02-15 NOTE — BH INPATIENT PSYCHIATRY PROGRESS NOTE - NSBHCONTPROVIDER_PSY_ALL_CORE
Not applicable
No...
Not applicable

## 2022-02-15 NOTE — BH INPATIENT PSYCHIATRY PROGRESS NOTE - NSTXCOPEDATEEST_PSY_ALL_CORE
11-Dec-2021

## 2022-02-15 NOTE — BH INPATIENT PSYCHIATRY PROGRESS NOTE - NSTREATMENTCERTY_PSY_ALL_CORE

## 2022-02-15 NOTE — BH INPATIENT PSYCHIATRY PROGRESS NOTE - TELEPSYCHIATRY?
No
Yes
No

## 2022-02-15 NOTE — BH INPATIENT PSYCHIATRY PROGRESS NOTE - NSICDXBHSECONDARYDX_PSY_ALL_CORE
Polysubstance abuse   F19.10  Severe alcohol use disorder   F10.20  
Alcohol abuse   F10.10  
Polysubstance abuse   F19.10  Severe alcohol use disorder   F10.20  
Polysubstance abuse   F19.10  Severe alcohol use disorder   F10.20  
Alcohol abuse   F10.10  
Polysubstance abuse   F19.10  Severe alcohol use disorder   F10.20  
Polysubstance abuse   F19.10  Severe alcohol use disorder   F10.20  
Alcohol abuse   F10.10  
Polysubstance abuse   F19.10  Severe alcohol use disorder   F10.20  
Alcohol abuse   F10.10  
Polysubstance abuse   F19.10  Severe alcohol use disorder   F10.20  
Polysubstance abuse   F19.10  Severe alcohol use disorder   F10.20  
Alcohol abuse   F10.10  
Polysubstance abuse   F19.10  Severe alcohol use disorder   F10.20  
Alcohol abuse   F10.10  
Polysubstance abuse   F19.10  Severe alcohol use disorder   F10.20  
Alcohol abuse   F10.10  
Polysubstance abuse   F19.10  Severe alcohol use disorder   F10.20  
Alcohol abuse   F10.10  
Polysubstance abuse   F19.10  Severe alcohol use disorder   F10.20  
Polysubstance abuse   F19.10  Severe alcohol use disorder   F10.20  
Alcohol abuse   F10.10  
Polysubstance abuse   F19.10  Severe alcohol use disorder   F10.20  
Polysubstance abuse   F19.10  Severe alcohol use disorder   F10.20  
Alcohol abuse   F10.10  
Polysubstance abuse   F19.10  Severe alcohol use disorder   F10.20  
Alcohol abuse   F10.10  
Polysubstance abuse   F19.10  Severe alcohol use disorder   F10.20  
Alcohol abuse   F10.10  

## 2022-02-15 NOTE — BH INPATIENT PSYCHIATRY PROGRESS NOTE - NSBHCONSULTIPREASON_PSY_A_CORE
danger to self; mental illness expected to respond to inpatient care

## 2022-02-15 NOTE — BH INPATIENT PSYCHIATRY PROGRESS NOTE - NSTXIMPULSGOAL_PSY_ALL_CORE
Will be able to demonstrate the ability to pause before acting out negatively
Will be able to describe/demonstrate alternative ways of managing his/her emotional state on the unit
Will be able to demonstrate the ability to pause before acting out negatively
Will be able to describe/demonstrate alternative ways of managing his/her emotional state on the unit
Will be able to demonstrate the ability to pause before acting out negatively
Will be able to describe/demonstrate alternative ways of managing his/her emotional state on the unit
Will be able to demonstrate the ability to pause before acting out negatively
Will be able to describe/demonstrate alternative ways of managing his/her emotional state on the unit
Will be able to demonstrate the ability to pause before acting out negatively
Will be able to describe/demonstrate alternative ways of managing his/her emotional state on the unit
Will be able to describe/demonstrate alternative ways of managing his/her emotional state on the unit
Will be able to demonstrate the ability to pause before acting out negatively
Will be able to describe/demonstrate alternative ways of managing his/her emotional state on the unit
Will be able to demonstrate the ability to pause before acting out negatively
Will be able to describe/demonstrate alternative ways of managing his/her emotional state on the unit
Will be able to describe/demonstrate alternative ways of managing his/her emotional state on the unit
Will be able to demonstrate the ability to pause before acting out negatively

## 2022-02-15 NOTE — BH INPATIENT PSYCHIATRY PROGRESS NOTE - NSTXPROBECT_PSY_ALL_CORE
ECT, PATIENT RECEIVING

## 2022-02-15 NOTE — BH INPATIENT PSYCHIATRY PROGRESS NOTE - NSBHCHARTREVIEWVS_PSY_A_CORE FT
Vital Signs Last 24 Hrs  T(C): 36.6 (02-15-22 @ 05:57), Max: 36.9 (02-14-22 @ 14:12)  T(F): 97.8 (02-15-22 @ 05:57), Max: 98.4 (02-14-22 @ 14:12)  HR: 77 (02-14-22 @ 15:10) (77 - 89)  BP: 141/91 (02-14-22 @ 15:10) (141/91 - 158/80)  BP(mean): --  RR: 18 (02-14-22 @ 15:35) (16 - 20)  SpO2: 97% (02-14-22 @ 15:10) (96% - 98%)    Orthostatic VS  02-15-22 @ 05:57  Lying BP: --/-- HR: --  Sitting BP: 129/87 HR: 70  Standing BP: --/-- HR: --  Site: --  Mode: --  Orthostatic VS  02-14-22 @ 20:05  Lying BP: --/-- HR: --  Sitting BP: 148/91 HR: 100  Standing BP: 138/87 HR: 107  Site: --  Mode: --  Orthostatic VS  02-14-22 @ 15:35  Lying BP: --/-- HR: --  Sitting BP: 148/91 HR: 100  Standing BP: 130/87 HR: 898  Site: --  Mode: --  Orthostatic VS  02-14-22 @ 08:07  Lying BP: --/-- HR: --  Sitting BP: 135/84 HR: 75  Standing BP: --/-- HR: --  Site: --  Mode: --

## 2022-02-15 NOTE — BH INPATIENT PSYCHIATRY PROGRESS NOTE - NSBHASSESSSUMMFT_PSY_ALL_CORE
Mood and psychosis remain much improved. Pt no longer feeling depressed, psychosis has resolved, and pt very hopeful about  the future. Consistently denying SIIP and HIIP and tending to ADLs appropriately.     Pt’s depressive and psychotic symptoms have improved considerably over the course of hospitalization. The patient continues to report absence of thoughts of self-harm, suicide, aggression, or homicide. He is compliant with medication and is in appropriate behavioral control. Although some residual symptoms remain, these are not impacting behavior and are not distressing. Given the above, patient does not appear to constitute an imminent threat to self or others and is appropriate for discharge. Patient instructed to contact outpt providers, call 911, or go to nearest emergency room with any thoughts of self-harm, suicide, aggression, or homicide. He expressed understanding of these emergency procedures and is in agreement with plan.     Pt to be discharged to rehab today. Pt s/p acute course of ECT (8 treatments total). Next Aristada 882mg due 2/27/22. Pt discharged with 30-day supply of Effexor XR 225mg daily and Atarax 50mg BID.

## 2022-02-15 NOTE — BH INPATIENT PSYCHIATRY PROGRESS NOTE - NSBHPROGSUIC_PSY_A_CORE
no

## 2022-02-15 NOTE — BH INPATIENT PSYCHIATRY PROGRESS NOTE - NSTXPROBCOPE_PSY_ALL_CORE
COPING, INEFFECTIVE

## 2022-02-15 NOTE — BH INPATIENT PSYCHIATRY PROGRESS NOTE - NSTXDCHOUSDATENEW_PSY_ALL_CORE
31-Jan-2022

## 2022-02-15 NOTE — BH INPATIENT PSYCHIATRY PROGRESS NOTE - NSTXIMPULSDATEEST_PSY_ALL_CORE
07-Jan-2022
07-Jan-2022
11-Dec-2021
07-Jan-2022
11-Dec-2021
07-Jan-2022
11-Dec-2021
11-Dec-2021
23-Dec-2021
07-Jan-2022
11-Dec-2021
07-Jan-2022
23-Dec-2021
07-Jan-2022
11-Dec-2021
07-Jan-2022
11-Dec-2021
23-Dec-2021
11-Dec-2021
23-Dec-2021
11-Dec-2021
23-Dec-2021

## 2022-02-15 NOTE — BH INPATIENT PSYCHIATRY PROGRESS NOTE - NSBHFUPINTERVALHXFT_PSY_A_CORE
No acute events overnight. Pt complaint with meds and slept well. Remains in good behavioral control. Continues to report much improved mood and feeling very hopeful about the future. Looking forward to discharge today and seeing family, remains motivated to attend rehab. No evidence of psychosis (now resolved). Denies S/I/I/P and H/I/I/P. Tending to ADLs independently and appropriately.

## 2022-02-15 NOTE — BH INPATIENT PSYCHIATRY PROGRESS NOTE - MSE UNSTRUCTURED FT
Patient is a 52yo man with good grooming and hygiene. Patient is cooperative, calm, pleasant, and smiling. Makes good eye contact. No psychomotor abnormalities noted. Speech is normal in rate, rhythm, and volume. Stated mood is "good" and "excited." Affect is euthymic and reactive, congruent with mood. Thought process is linear and goal-directed. Thought content: denies S/I/I/P, denies paranoia, no evidence of delusions. No perceptual disturbances noted. Insight and judgment are good. Impulse control is intact.     MMSE completed 1/27/22 - 29/30

## 2022-02-15 NOTE — BH INPATIENT PSYCHIATRY PROGRESS NOTE - NSBHFUPINTERVALCCFT_PSY_A_CORE
"I'm still hopeless"
f/u psychosis and depression
Follow up for psychosis and depression 
f/u psychosis
"I'm hungry"
f/u psychosis
"I'm okay"
"I didn't sleep well last night"
Follow up for psychosis and depression 
f/u depression and psychosis
f/u psychosis
Follow up for Depression and Psychosis 
"I want ECT"
F/u for depression
Follow up for Depression and Psychosis 
f/u psychosis
Follow up for psychosis and depression 
f/u psychosis
"I want ECT"
Follow up for psychosis and depression 
f/u psychosis
f/u psychosis
"I have a headache and stomach pain"
Follow up for psychosis and depression 
Follow up for Depression and Psychosis 
f/u depression and psychosis
f/u psychosis
Follow up for psychosis and depression 
f/u psychosis
"I didn't sleep well last night"
"I want ECT"
f/u depression and psychosis
"I'm hungry and annoyed"
f/u depression and psychosis
f/u psychosis
Psychosis  ETOH Dependence
f/u psychosis
f/u psychosis
f/u depression and psychosis
f/u depression and psychosis
f/u psychosis

## 2022-02-15 NOTE — BH INPATIENT PSYCHIATRY PROGRESS NOTE - NS ED [facility] MED ROS PSYCHIATRIC
See HPI

## 2022-02-15 NOTE — BH INPATIENT PSYCHIATRY PROGRESS NOTE - NSTXPROBDCHOUS_PSY_ALL_CORE
DISCHARGE ISSUE - LACK OF APPROPRIATE HOUSING

## 2022-02-15 NOTE — BH INPATIENT PSYCHIATRY PROGRESS NOTE - NSTXIMPULSINTERMD_PSY_ALL_CORE
Treatment with medications
Treatment with medications and ECT
Treatment with medications and ECT
Treatment with medications
Treatment with medications and ECT
Treatment with medications
Treatment with medications and ECT
Treatment with medications and ECT

## 2022-02-15 NOTE — BH INPATIENT PSYCHIATRY PROGRESS NOTE - NSBHMETABOLIC_PSY_ALL_CORE_FT
BMI: BMI (kg/m2): 33.2 (02-09-22 @ 13:11)  HbA1c: A1C with Estimated Average Glucose Result: 5.4 % (12-21-21 @ 09:46)    Glucose: POCT Blood Glucose.: 95 mg/dL (11-30-21 @ 05:53)    BP: 141/91 (02-14-22 @ 15:10) (141/91 - 158/80)  Lipid Panel: Date/Time: 12-21-21 @ 09:46  Cholesterol, Serum: 148  Direct LDL: --  HDL Cholesterol, Serum: 40  Total Cholesterol/HDL Ration Measurement: --  Triglycerides, Serum: 55

## 2022-02-15 NOTE — BH INPATIENT PSYCHIATRY PROGRESS NOTE - NSTXDCHOUSGOAL_PSY_ALL_CORE
Will agree to participate in housing process
Will meet with care coordinator and accept services
Will agree to participate in housing process
Will meet with care coordinator and accept services
Will agree to participate in housing process
Will meet with care coordinator and accept services
Will agree to participate in housing process
Will meet with care coordinator and accept services
Will agree to participate in housing process
Will meet with care coordinator and accept services
Will agree to participate in housing process
Will agree to participate in housing process
Will meet with care coordinator and accept services
Will agree to participate in housing process
Will meet with care coordinator and accept services
Will agree to participate in housing process
Will meet with care coordinator and accept services
Will meet with care coordinator and accept services

## 2022-02-15 NOTE — BH INPATIENT PSYCHIATRY PROGRESS NOTE - NSBHROSSYSTEMS_PSY_ALL_CORE
Psychiatric

## 2022-02-15 NOTE — BH INPATIENT PSYCHIATRY PROGRESS NOTE - NSICDXBHPRIMARYDX_PSY_ALL_CORE
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  
Schizophrenia   F20.9  
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  
Schizophrenia   F20.9  
Psychosis   F29  
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  
Schizophrenia   F20.9  
Schizophrenia   F20.9  
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  
Schizophrenia   F20.9  
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  
Schizophrenia   F20.9  
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  
Psychosis   F29  
Schizophrenia   F20.9  
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  
Schizophrenia   F20.9  
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  
Schizophrenia   F20.9  
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  
Schizophrenia   F20.9  
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  
Schizophrenia   F20.9  
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  
Schizophrenia   F20.9  

## 2022-09-06 NOTE — BH INPATIENT PSYCHIATRY PROGRESS NOTE - NSBHMSEINSIGHT_PSY_A_CORE
Fair
Poor
Poor
Fair
intact
Poor
Fair
Poor
Fair
Poor
Fair
Poor
Fair
Poor
Poor

## 2023-04-17 NOTE — BH INPATIENT PSYCHIATRY ASSESSMENT NOTE - NSTREATMENTCERTY_PSY_ALL_CORE
[facility] EMERGENCY DEPT  [address] [id number]  [phone]    Work/School Note    Date: 4/17/2023    To Whom It May concern:    Jennifer Varner was seen and treated today in the emergency room by the following provider(s):  Attending Provider: Princess Lowe MD.      Jennifer Varner is excused from work/school on 04/17/23 and 04/18/23.      She is medically clear to return to work/school on 4/19/2023.       Sincerely,          Princess Lowe MD        That inpatient services furnished since the previous certification or recertification were, and continue to be required: for treatment that could reasonably be expected to improve the patient's condition; or, for diagnostic study;    That the hospital records show that the services furnished were: intensive treatment services; admission and related services necessary for diagnostic study or equivalent services;    That the patient continues to need, on a daily basis active inpatient treatment furnished directly by or requiring the supervision of inpatient psychiatric facility personnel.

## 2023-08-31 NOTE — BH TREATMENT PLAN - NSTXPSYCHOINTERMD_PSY_ALL_CORE
c/w Abilify (now REAGAN) and Depakote
- H/H consistently elevated  - EPO: 8.0  - Recommend heme f/u outpatient
c/w KIRK Aristada and Depakote and starting ECT
switch from Zyprexa to Abilify with goal for KIRK, c/w Depakote
c/w Abilify and Depakote, goal for KIRK
c/w KIRK Aristada and Depakote
c/w KIRK Aristada and Depakote
c/w KIRK Aristada, Effexor, and ECT
c/w KIRK Aristada, Effexor, and ECT
- K+ to 3.0 on admission, currently wnl  - S/p 40mEq KCl tablet and 10 mEQ IVPB in ED  - Maintain K>4 and Mg>2  - F/u BMP and replete as necessary

## 2023-10-24 NOTE — BH TREATMENT PLAN - NSTXCAREGIVERPARTICIPATE_PSY_P_CORE
Family/Caregiver participated in identification of needs/problems/goals for treatment
Family/Caregiver participated in identification of needs/problems/goals for treatment/Family/Caregiver participated in defining interventions/Family/Caregiver participated in development of after care plan
Family/Caregiver participated in identification of needs/problems/goals for treatment/Family/Caregiver participated in development of after care plan
Family/Caregiver participated in identification of needs/problems/goals for treatment/Family/Caregiver participated in defining interventions/Family/Caregiver participated in development of after care plan
No

## 2023-11-19 NOTE — BH INPATIENT PSYCHIATRY PROGRESS NOTE - NSBHASSESSSUMMFT_PSY_ALL_CORE
Pt remains in good behavioral control and is cooperative and pleasant. Psychosis also remains improved though pt with ongoing persecutory and referential delusions (though without full delusional conviction, more in the realm of paranoid ideation). Pt remains depressed with passive conditional SI when thinking about relapsing though appearing more hopeful when thinking about family. Most likely 2/2 MDD with psychosis. Team continues to provide reassurance and reality testing.      Plan:  1. Pt s/p Aristada loading on 12/30/21, next monthly Aristada 882mg due 1/27/22.   2. C/w Depakote 1000mg qhs.    3. Increase Effexor XR to 187.5 mg daily  4. Start Atarax 50mg BID for anxiety  5. With regards to vague physical complaints - pt does not objectively appear unwell or unsteady. He is eating and drinking, and vitals and recent labs (including CBC, CMP, B12, folate, ammonia level) have been within normal limits. Pt's confusion and AMS that he initially presented with have also improved significantly/resolved. Pt remains on iron supplementation and Hb has been uptrending. Given that pt presented to [place] prior to current hospitalization with AMS, r/o dx include Wernicke's encephalopathy, however he was treated with IV thiamine during hospitalization at [place] (and oral supplementation just recently d/c). Will continue to monitor. Of note MMSE (1/10) at 27.  -will get repeat CBC and CMP tomorrow AM  6. Dispo - to rehab in [place], pending placement       Kasia Disla RN

## 2024-05-02 NOTE — PROVIDER CONTACT NOTE (OTHER) - NAME OF MD/NP/PA/DO NOTIFIED:
Thyroid Nodule    Will send for MM    Prediabetes    Snacks can be an important part of a balanced, healthy meal plan. They allow you to eat more frequently, feeling full and satisfied throughout the day. Also, they allow you to spread carbohydrates evenly, which may stabilize blood sugars.  Plus, snacks are enjoyable!     The amount of carbohydrate needed at snacks varies. Generally, about 15-30 grams of carbohydrate per snack is recommended.  Below you will find some tasty treats.       0-5 gm carb  Crystal Light  Vitamin Water Zero  Herbal tea, unsweetened  2 tsp peanut butter on celery  1./2 cup sugar-free jell-o  1 sugar-free popsicle  ¼ cup blueberries  8oz Blue Fannie unsweetened almond milk  5 baby carrots & celery sticks, cucumbers, bell peppers dipped in ¼ cup salsa, 2Tbsp light ranch dressing or 2Tbsp plain Greek yogurt  10 Goldfish crackers  ½ oz low-fat cheese or string cheese  1 closed handful of nuts, unsalted  1 Tbsp of sunflower seeds, unsalted  1 cup Smart Pop popcorn  1 whole grain brown rice cake        15 gm carb  1 small piece of fruit or ½ banana or 1/2 cup lite canned fruit  3 kimberley cracker squares  3 cups Smart Pop popcorn, top spray butter, Aquino lite salt or cinnamon and Truvia  5 Vanilla Wafers  ½ cup low fat, no added sugar ice cream or frozen yogurt (Blue bell, Blue Bunny, Weight Watchers, Skinny Cow)  ½ turkey, ham, or chicken sandwich  ½ c fruit with ½ c Cottage cheese  4-6 unsalted wheat crackers with 1 oz low fat cheese or 1 tbsp peanut butter   30-45 goldfish crackers (depending on flavor)   7-8 [employer] mini brown rice cakes (caramel, apple cinnamon, chocolate)   12 [employer] mini brown rice cakes (cheddar, bbq, ranch)   1/3 cup hummus dip with raw veg  1/2 whole wheat louann, 1Tbsp hummus  Mini Pizza (1/2 whole wheat English muffin, low-fat  cheese, tomato sauce)  100 calorie snack pack (Oreo, Chips Ahoy, Ritz Mix, Baked Cheetos)  4-6 oz. light or Greek Style yogurt (Chobani, 
Saige Daly, [facility])  ½ cup sugar-free pudding    6 in. wheat tortilla or louann oven toasted chips (topped with spray butter flavoring, cinnamon, Truvia OR spray butter, garlic powder, chili powder)   18 BBQ Popchips (available at [employer], [employer], Fresh Market)         Eating the Right Number of Calories (5964-4129 Guidelines)    Calories are a measure of the energy you get from food. If you eat more calories than you use, you will gain weight. If you eat fewer calories than you use, you will lose weight. Below are tables that give the number of calories needed each day. Look for your gender, age, and activity level. If you stick to this number, you should neither gain nor lose weight. Note that this is an estimated number of calories.* Your exact number may differ.    Women  Age in years Low activity level (calories/day) Moderate activity level (calories/day) High activity level (calories/day)   19 to 30 1,800-2,000 2,000-2,200 2,400   31 to 50 1,800 2,000 2,200   51 and older 1,600 1,800 2,000-2,200      Men  Age in years Low activity level  (calories/day) Moderate activity level (calories/day) High activity level (calories/day)   19 to 30 2,400-2,600 2,600-2,800 3,000   31 to 50 2,200-2,400 2,400-2,600 2,800-3,000   51 and older 2,000-2,200 2,200-2,400 2,400-2,800     Activity levels defined  Low. Only light physical activity such as that done during typical daily life.  Moderate. Light physical activity done during typical daily life AND physical activity equal to walking about 1.5 to 3 miles a day at 3 to 4 miles per hour.  High. Light physical activity done during typical daily life AND physical activity equal to walking more than 3 miles a day at 3 to 4 miles per hour.  *From Dietary Guidelines for Americans, 0770-6211, [place] Department of Health and Human Services.    Eat less fat  A gram of fat has almost 2.5 times the calories of a gram of protein or carbohydrates. Try to balance your food 
Unit leadership and bed board.
choices so that only 20% to 35% of your calories comes from total fat. This means an average of 2½ to 3½ grams of fat for each 100 calories you eat.    Eat more fiber  High-fiber foods are digested more slowly than low-fiber foods, so you feel full longer. Try to get at least 25 grams of fiber each day for a 2000 calorie diet. Foods high in fiber include:  Vegetables and fruits  Whole-grain or bran breads, pastas, and cereals  Legumes (beans) and peas  As you begin to eat more fiber, be sure to drink plenty of water to keep your digestive system working smoothly.    Tips  Do's and don'ts include:   Dont skip meals. This often leads to overeating later on. Its best to spread your eating throughout the day.  Eat a variety of foods, not just a few favorites.  If you find yourself eating when youre not hungry, ask yourself why. Many of us eat when were bored, stressed, or just to be polite. Listen to your body. If youre not hungry, get busy doing something else instead of eating.  Eat slower, shooting for 20 to 30 minutes for each meal. It takes 20 minutes for your stomach to tell your brain that its full. Slow eaters tend to eat less and are still satisfied, while fast eaters may tend to be overeaters.   Pay attention to what you eat. Dont read or watch TV during your meal.     ---------------------------------------------------------------------------------------------------------------------------------------------------    Losing Weight for Heart Health  Excess weight is a major risk factor for heart disease. Losing weight has many benefits including lowering your blood pressure, improving your cholesterol level, and decreasing your risk for diseases such as diabetes and heart disease. It may help keep your arteries open so that your heart can get the oxygen-rich blood it needs. All in all, losing weight makes you healthier.       Exercise with a friend. When activity is fun, you're more likely to stick with 
it.     Calories and weight loss  Calories are the fuel your body burns for energy. You get the calories you need from the food you eat. For healthy weight loss, women should eat at least 1,200 calories a day, men at least 1,500.  When you eat more calories than you need, your body stores the extra calories as fat. One pound of fat equals 3,500 calories.  To lose weight, try to reduce your total calorie intake by 500 calories. To do this, eat 250 calories less each day. Add activity to burn the other 250 calories. Walking 2.5 miles burns about 250 calories. Other more intense activities can burn more calories in the time you spend doing them, such as swimming and running. It is important to understand that reducing calorie intake is much more effective at weight loss than is exercise.  Eat a variety of healthy foods to get the nutrients you need.    Tips for losing weight  Drink 8 to 10 glasses of water a day.  Dont skip meals. Instead, eat smaller portions.  Eat your meals earlier in the day.  Cut out sugary drinks such as soda and fruit juices.  Make your later meals lighter than your earlier meals.     What can exercise help?  Blood sugar. Regular exercise improves blood sugar control by helping your body use insulin.  Mental and emotional health. Physical activity relieves stress and helps you sleep better.  Heart health. With regular exercise, you can reduce your risk of heart disease and high blood pressure. You can also improve your cholesterol and triglyceride levels.  Weight. Exercise helps you lose fat, gain muscle, and control your weight.  Health of blood vessels and nerves. Activity helps lower blood sugar. This helps prevent damage to blood vessels and nerves that can cause problems with your brain, eyes, feet, and legs.  Finances. If you manage your blood sugar, you may spend less on medical care.    2 types of exercise  Two types of exercise help your body use blood sugar. Experts advise both types 
of exercise for people with diabetes:  Aerobic exercise. This is a rhythmic, repeated, continued movement of large muscle groups for at least 10 minutes at a time. You should do this about 30 minutes a day on most days of the week. Examples include walking, bicycling, jogging, swimming, water aerobics, and many sports.  Resistance exercise (strength training). This type of exercise uses muscles to move weight or work against resistance. You can do it with free weights, machines, resistance tubing, or your own body weight. Adults with diabetes should aim for 2 to 3 sessions of resistance exercise each week. Its best to skip a day in between.    A goal to shoot for  Your main goal is to become more active. Even a little bit helps. Choose an activity that you like. Walking is one great form of exercise that everyone can do. Talk to your healthcare provider about any limits you may have before starting with an exercise program. Then aim for 150 minutes a week of physical activity. Dont let more than 2 days go by without exercise. When you are sitting for long periods of time, get up for short sessions of light activity every 30 minutes.    Getting activity into your day  Being more active doesnt have to be hard work. Try these to get more activity into your day:  Take the stairs instead of the elevator  Garden, do housework, and yard work  Choose a parking space farther from the store  Walk to talk to a co-worker instead of calling  Take a 10-minute walk around the block at lunch  Walk to a bus stop a little farther from your home or office  Walk the dog after dinner     ---------------------------------------------------------------------------------------------------------------------------------------------------    Reading Food Labels  Look for the Nutrition Facts label on packaged foods. Reading labels is a big step toward eating healthier. The tips below help you know what to look for.    Serving size. Read 
this closely because the package, jar, or can may contain more than 1 serving. This is how to measure 1 serving of the food in the package. If you eat more than 1 serving, you get more of everything on the label -- including fat, cholesterol, and calories.  Total fat. This tells you how many grams (g) of fat are in 1 serving. Fat is high in calories. A healthy goal is to have less than 25% of your daily calories come from fat.  Saturated fat. This tells you how much saturated fat is in 1 serving. Saturated fat raises your cholesterol the most. Look for foods that have little or no saturated fat.  Trans fat. This tells you how much trans fat is in 1 serving. Even a small amount of trans fat can harm your health. Choose foods that have no trans fat.  Cholesterol. This tells you how much cholesterol is in 1 serving. For many years, it had been recommended to eat less than 300 milligrams (mg) of cholesterol a day. New guidelines have removed this limitation as cholesterol has been recently shown to not raise blood cholesterol levels as significantly as previously thought. However, many foods high in cholesterol are also high in saturated fat. It is recommended to limit saturated fat in your diet.  Calories from fat. This number tells you how many calories from fat are in 1 serving (there are 9 calories per gram of fat). Look for foods with few calories from fat.  % Daily value. The higher the number, the more 1 serving has of that nutrient. Look for foods that have low numbers for total fat, saturated fat, cholesterol, and sodium.  Sodium. This tells you how much sodium (salt) is in 1 serving. Choose foods with low numbers for sodium.  Dietary fiber. This number tells you how much fiber is in 1 serving. Foods that are high in fiber can help you feel full. They can also be good for your heart and digestion. The recommended daily amount of fiber is 25 grams for women and 38 grams for men. After age 50, your daily fiber 
MD Beck
needs drop to 21 grams for women and 30 grams for men.       ---------------------------------------------------------------------------------------------------------------------------------------------------    Understanding Carbohydrates, Fats, and Protein  Food is a source of fuel and nourishment for your body. Its also a source of pleasure. Having diabetes doesnt mean you have to eat special foods or give up desserts. Instead, your dietitian can show you how to plan meals to suit your body. To start, learn how different foods affect blood sugar.    Carbohydrates  Carbohydrates are the main source of fuel for the body. Carbohydrates raise blood sugar. Many people think carbohydrates are only found in pasta or bread. But carbohydrates are actually in many kinds of foods:  Sugars occur naturally in foods such as fruit, milk, honey, and molasses. Sugars can also be added to many foods, from cereals and yogurt to candy and desserts. Sugars raise blood sugar.  Starches are found in bread, cereals, pasta, and dried beans. Theyre also found in corn, peas, potatoes, yam, acorn squash, and butternut squash. Starches also raise blood sugar.   Fiber is found in foods such as vegetables, fruits, beans, and whole grains. Unlike other carbs, fiber isnt digested or absorbed. So it doesnt raise blood sugar. In fact, fiber can help keep blood sugar from rising too fast. It also helps keep blood cholesterol at a healthy level.  Did you know?  Even though carbohydrates raise blood sugar, its best to have some in every meal. They are an important part of a healthy diet.     Fat  Fat is an energy source that can be stored until needed. Fat does not raise blood sugar. However, it can raise blood cholesterol, increasing the risk of heart disease. Fat is also high in calories, which can cause weight gain. Not all types of fat are the same.    More Healthy:  Monounsaturated fats are mostly found in vegetable oils, such as olive, 
canola, and peanut oils. They are also found in avocados and some nuts. Monounsaturated fats are healthy for your heart. Thats because they lower LDL (unhealthy) cholesterol.  Polyunsaturated fats are mostly found in vegetable oils, such as corn, safflower, and soybean oils. They are also found in some seeds, nuts, and fish. Polyunsaturated fats lower LDL (unhealthy) cholesterol. So, choosing them instead of saturated fats is healthy for your heart. Certain unsaturated fats can help lower triglycerides.     Less Healthy:  Saturated fats are found in animal products, such as meat, poultry, whole milk, lard, and butter. Saturated fats raise LDL cholesterol and are not healthy for your heart.  Hydrogenated oils and trans fats are formed when vegetable oils are processed into solid fats. They are found in many processed foods. Hydrogenated oils and trans fats raise LDL cholesterol and lower HDL (healthy) cholesterol. They are not healthy for your heart.    Protein  Protein helps the body build and repair muscle and other tissue. Protein has little or no effect on blood sugar. However, many foods that contain protein also contain saturated fat. By choosing low-fat protein sources, you can get the benefits of protein without the extra fat:  Plant protein is found in dry beans and peas, nuts, and soy products, such as tofu and soymilk. These sources tend to be cholesterol-free and low in saturated fat.  Animal protein is found in fish, poultry, meat, cheese, milk, and eggs. These contain cholesterol and can be high in saturated fat. Aim for lean, lower-fat choices.    ---------------------------------------------------------------------------------------------------------------------------------------------------    Understanding Carbohydrates    A car needs the right type of fuel to run. And you need the right kind of food to function. To keep your energy level up, your body needs food that has carbohydrates. But 
carbohydrates raise blood sugar levels higher and faster than other kinds of food. Your dietitian will work with you to figure out the amount of carbohydrates you need.    Starches  Starches are found in grains, some vegetables, and beans. Grain products include bread, pasta, cereal, and tortillas. Starchy vegetables include potatoes, peas, corn, lima beans, yams, and squash. Kidney beans, manzano beans, black beans, garbanzo beans, and lentils also contain starches.    Sugars  Sugars are found naturally in many foods. Or sugar can be added. Foods that contain natural sugar include fruits and fruit juices, dairy products, honey, and molasses. Added sugars are found in most desserts, processed foods, candy, regular soda, and fruit drinks. These are very helpful for treating low blood sugar, or hypoglycemia. They provide sugar quickly. Try to keep at least 15 to 20 grams of these simple sugars with you at all times. Eat this if you begin to have low blood sugar symptoms.    Fiber  Fiber comes from plant foods. Most fiber isnt digested by the body. Instead of raising blood sugar levels like other carbohydrates, it actually stops blood sugar from rising too fast. Fiber is found in fruits, vegetables, whole grains, beans, peas, and many nuts.    Carb counting  Keep track of the amount of carbohydrates you eat. This can help you keep the right balance of physical activity and medicine. The amount of carbohydrates needed will vary for each person. It depends on many things such as your health, the medicines you take, and how active you are. Your healthcare team will help you figure out the right amount of carbohydrates for you. You may start with around 45 to 60 grams of carbohydrate per meal, depending on your situation. Carb counting is a system that helps you keep track of the carbohydrates you eat at each meal.  Carbohydrates come from a variety of foods. These include grains, starchy vegetables, fruit, milk, beans, and 
snack foods. You can either count carbohydrate grams or carbohydrate servings. When you count carbohydrate servings, 1 carbohydrate serving = 15 grams of carbohydrates.  Here are some examples of foods containing about 15 grams of carbohydrates (1 serving of carbohydrates):  1/2 cup of canned or frozen fruit  A small piece of fresh fruit (4 ounces)  1 slice of bread  1/2 cup of oatmeal  1/3 cup of rice  4 to 6 crackers  1/2 English muffin  1/2 cup of black beans  1/4 of a large baked potato (3 ounces)  2/3 cup of plain fat-free yogurt  1 cup of soup  1/2 cup of casserole  6 chicken nuggets  2-inch-square brownie or cake without frosting  2 small cookies  1/2 cup of ice cream or sherbet    Carb counting is easier when food labels are available. Look at the label to see how many grams of total carbohydrates the food contains. Then you can figure out how much you should eat.  Two very important lines to look at on the label are the serving size and the total carbohydrate amount. Here are some tips for using food labels to count your carbohydrate intake:  Check the serving size. The information on the label is based on that serving size. If you eat more than the listed serving size, you may have to double or triple the other information on the label.   Check the total grams of carbohydrates. Total carbohydrate from the label includes sugar, starch, and fiber. Be sure to use the total carbohydrate number and not sugar alone.  Know how many grams of carbohydrates you can have.  Be familiar with the matching portion sizes.  Compare labels. Compare the labels of different products, looking at serving sizes and total carbohydrates to find the products that work best for you.   Don't forget protein and fat. With all the focus on carb counting, it might be easy to forget protein and fat in your meals. Don't forget to include sources of protein and healthy fat to balance your meals.  Its also important to be consistent with 
the amount and time you eat when taking a fixed dose of diabetes medicine. Work with your healthcare provider or dietitian if you need additional help. He or she can help you keep track of your carbohydrate intake. He or she can also help you figure out how many grams of carbohydrates you should have.      ---------------------------------------------------------------------------------------------------------------------------------------------------    Diabetes: Learning About Serving and Portion Sizes     A good rule of thumb: Devote half your plate to vegetables and green salad. Split the other half between protein and starchy carbohydrates. Fruit makes a good dessert.     Servings and portions. Whats the difference? These terms can be very confusing. But learning to measure serving sizes can help you figure out how many carbohydrates (carbs) and other foods you eat each day. They are also powerful tools for managing your weight.    Servings and portions  Many different words are used to describe amounts of food. If your health care provider uses a term youre not sure of, dont be afraid to ask. It helps to know the difference between servings and portions:  A serving size is a fixed size. Food producers use this term to describe their products. For example, the label on a cereal box could say that 1 cup of dry cereal = 1 serving.  A portion (also called a helping) is how much you eat or how much you put on your plate at a meal. For example, you might eat 2 cups of cereal at breakfast.    Using serving information  The portion you choose to eat (such as 2 cups of cereal) may be more than one serving as listed on the food label (such as 1 cup of cereal). Thats why it helps to measure or weigh the food you eat. Because the food label values are based on servings, youll need to know how many servings you eat at one sitting.     Ounces: 2 to 3 ounces is about the size of your palm.       1 Cup: 1 cup (or a 
"medium-sized piece) is about the size of your fist.       1/2 Cup: 1/2 cup is about the size of your cupped hand.      One tablespoon is about the size of your thumb.  One teaspoon is about the size of the tip of your thumb.    Keeping track of serving sizes  When youre planning for a snack or a meal, keep servings in mind. If you dont have measuring cups or a scale handy, there are ways to eyeball serving sizes, such as comparing your food to the size of your hand (see pictures above).    Managing portion sizes  If your weight is a concern, reducing your portions can help. You can eat more than one serving of a food at once. But to keep from eating too much at one meal, learn how to manage your portions. A portion is the amount of each type of food on your plate. See the plate diagram for an example of balanced portions.    ---------------------------------------------------------------------------------------------------------------------------------------------------    Diabetes: Shopping for and Preparing Meals    Having diabetes doesnt mean you have to shop in a special aisle or look for special foods. But you will need to make choices. By comparing items and reading food labels, you can find the healthiest foods for you and your family.  Comparing items  When you shop, compare items to find the best ones for your needs. Keep these facts in mind:  No sugar added does not mean a product is sugar-free.  "Sugar-free" means less than 1/2 gram (g) of sugar per serving.  Fat free means less than 1/2 g of fat per serving. This does not necessarily mean the product is low in calories.  Low fat means 3 g fat or less per serving. Reduced fat or less fat means 25% less fat than the regular version. Some of this fat may be saturated or trans fat. And calories per serving may be similar to the regular version.    Making small changes  Dont try to change all of your eating habits at once. Here are some ideas "
to start with:  Try fat-free or low-fat cheese, milk, and yogurt. Also try leaner cuts of meat. This will help you cut down on saturated fat.  Try whole-grain breads, brown rice, and whole-wheat pasta.  Load up on fresh or frozen vegetables. If you buy canned, choose low-sodium vegetables.  Avoid processed foods as much as possible. They tend to be low in fiber and high in trans fats and sodium.  Try tofu, soymilk, or meat substitutes.?They can help you cut cholesterol and saturated fat out of your diet.    Learning to read food labels  To find healthy foods that help you control blood sugar, learn how to read food labels. Look for the Nutrition Facts label on packaged foods. It will tell you how much carbohydrate, sugar, fat, and fiber is in each serving. Then, you can decide whether or not the food fits into your meal plan.    Using the food label  So, once you have the food label, what do you do with it? The food label helps in many ways. Use it to:  Compare items and decide which is the best for your health needs.  Track the number of carbohydrates in your portions.  Figure out how many servings of a food you can have and still stay within the number of carbohydrates for that meal.    Planning meals  For good blood sugar control, plan what and when youll eat. Start by creating a meal plan that includes all the food groups. Then, time your meals to help keep your blood sugar level steady. You may need to adjust your plan for special situations.    Eat from all the food groups  The basis of a healthy meal plan is variety (eating many different types of foods). Look for lean meats, fresh fruits and vegetables, whole grains, and low-fat or non-fat dairy products. Eating a wide variety of foods provides the nutrients your body needs. It can also keep you from getting bored with your meal plan.    Reduce liquid sugars  Extra calories from sodas, sports drinks, and fruit drinks make it hard to keep blood sugar in 
range. Cut as many liquid sugars from your meal plan as you can. This includes most fruit juices, which are often high in natural or added sugar. Instead, drink plenty of water and other sugar-free beverages.    Eat less fat  If you need to lose some weight, try to reduce the amount of fat in your diet. This can also help lower your cholesterol level to keep blood vessels healthier. Cut fat by using only small amounts of liquid oil for cooking. Read food labels carefully to avoid foods with unhealthy trans fats.    Timing your meals  When it comes to blood sugar control, when you eat is as important as what you eat. You may need to eat several small meals spaced evenly throughout the day to stay in your target range. So dont skip breakfast or wait until late in the day to get most of your calories. Doing so can cause your blood sugar to rise too high or fall too low.    Cooking wisely  Broil, steam, bake, or grill meats and vegetables, instead of frying.  Instead of cream-based sauces or sugary glazes, flavor foods with vegetable purée, lemon or lime juice, or herb seasonings.  Remove skin from chicken and turkey before serving.  Look in cookbooks for easy, low-fat, low-sugar recipes. When making your usual recipes, cut sugar by 1/2 and fat by 1/3.      ---------------------------------------------------------------------------------------------------------------------------------------------------    Healthy Meals for Diabetes    Ask your healthcare team to help you make a meal plan that fits your needs. Your meal plan tells you when to eat your meals and snacks, what kinds of foods to eat, and how much of each food to eat. You dont have to give up all the foods you like. But you do need to follow some guidelines.  Choose healthy carbohydrates  Starches, sugars, and fiber are all types of carbohydrates. Fiber can help lower your cholesterol and triglycerides. Fiber is also healthy for your heart. You should have 
20 to 35 grams of total fiber each day. Fiber-rich foods include:  Whole-grain breads and cereals  Bulgur wheat  Brown rice     Whole-wheat pasta  Fruits and vegetables  Dry beans, and peas   Keep track of the amount of carbohydrates you eat. This can help you keep the right balance of physical activity and medicine. The amount of carbohydrates needed will vary for each person. It depends on many things such as your health, the medicines you take, and how active you are. Your healthcare team will help you figure out the right amount of carbohydrates for you. You may start with around 45 to 60 grams of carbohydrates per meal, depending on your situation.   Here are some examples of foods containing about 15 grams of carbohydrates (1 serving of carbohydrates):  1/2 cup of canned or frozen fruit  A small piece of fresh fruit (4 ounces)  1 slice of bread  1/2 cup of oatmeal  1/3 cup of rice  4 to 6 crackers  1/2 English muffin  1/2 cup of black beans  1/4 of a large baked potato (3 ounces)  2/3 cup of plain fat-free yogurt  1 cup of soup  1/2 cup of casserole  6 chicken nuggets  2-inch-square brownie or cake without frosting  2 small cookies  1/2 cup of ice cream or sherbet    Choose healthy protein foods  Eating protein that is low in fat can help you control your weight. It also helps keep your heart healthy. Low-fat protein foods include:  Fish  Plant proteins, such as dry beans and peas, nuts, and soy products like tofu and soymilk  Lean meat with all visible fat removed  Poultry with the skin removed  Low-fat or nonfat milk, cheese, and yogurt    Limit unhealthy fats and sugar  Saturated and trans fats are unhealthy for your heart. They raise LDL (bad) cholesterol. Fat is also high in calories, so it can make you gain weight. To cut down on unhealthy fats and sugar, limit these foods:  Butter or margarine  Palm and palm kernel oils and coconut oil  Cream  Cheese  Pearson  Lunch meats     Ice cream  Sweet bakery goods 
such as pies, muffins, and donuts  Jams and jellies  Candy bars  Regular sodas     How much to eat  The amount of food you eat affects your blood sugar. It also affects your weight. Your healthcare team will tell you how much of each type of food you should eat.  Use measuring cups and spoons and a food scale to measure serving sizes.  Learn what a correct serving size looks like on your plate. This will help when you are away from home and cant measure your servings.  Eat only the number of servings given on your meal plan for each food. Dont take seconds.  Learn to read food labels. Be sure to look at serving size, total carbohydrates, fiber, calories, sugar, and saturated and trans fats. Look for healthier alternatives to foods that have added sugar.  Plan ahead for parties so you can still have a good time without going overboard with unhealthy food choices. Set a good example yourself by bringing a healthy dish to pot lucks.     Choose healthy snacks  When it comes to snacks, we usually think about foods with added sugar and fats. But there are many other options for healthier snack choices. Here are a few snack ideas to choose from:  Snacks with less than 5 grams of carbohydrates  1 piece of string cheese  3 celery sticks plus 1 tablespoon of peanut butter  5 cherry tomatoes plus 1 tablespoon of ranch dressing  1 hard-boiled egg  1/4 cup of fresh blueberries   5 baby carrots  1 cup of light popcorn  1/2 cup of sugar-free gelatin  15 almonds  Snacks with about 10 to 20 grams of carbohydrates  1/3 cup of hummus plus 1 cup of fresh cut nonstarchy vegetables (carrots, green peppers, broccoli, celery, or a combination)  1/2 cup of fresh or canned fruit plus 1/4 cup of cottage cheese  1/2 cup of tuna salad with 4 crackers  2 rice cakes and a tablespoon of peanut butter  1 small apple or orange  3 cups light popcorn  1/2 of a turkey sandwich (1 slice of whole-wheat bread, 2 ounces of turkey, and mustard)  Portion 
sizes are important to controlling your blood sugar and staying at a healthy weight. Stock up on healthy snack items so you always have them on hand.    When to eat  Your meal plan will likely include breakfast, lunch, dinner, and some snacks.  Try to eat your meals and snacks at about the same times each day.  Eat all your meals and snacks. Skipping a meal or snack can make your blood sugar drop too low. It can also cause you to eat too much at the next meal or snack. Then your blood sugar could get too high.    ---------------------------------------------------------------------------------------------------------------------------------------------------    Eating Out When You Have Diabetes  Eating right is an important part of keeping your blood sugar in your target range. You just need to make healthy choices.    Be creative when eating out. Many places dont make you stick strictly to the menu. Instead of ordering a large entree, choose an appetizer or two and a bowl of soup. A mix of side orders can also make a good meal. Read the descriptions of other entrees and specials. If another entree comes with baby carrots, ask for a side order of them even if they dont come with your entree. Ask how food is prepared or if it can be made differently.  Tips for making the most of your meal out  Ask to have high-fat, high-calorie extras, such as French fries and potato chips, left off your plate so you wont be tempted.   Ask what substitutions are available. Instead of French fries, you may be able to have a side of salad with low-calorie dressing.  Order low-fat milk instead of cream for your coffee.  Ask for vegetables and main courses to be served without sauces, butter, margarine, or oil.  Be aware of portion size. You dont have to clean your plate. Take half your meal home in a doggie bag to eat the next day.  Look for heart-healthy or low-fat entrees that include whole grains, vegetables, or fruits.  Choose 
foods that are grilled, broiled, or steamed.  Avoid dishes that are described on the menu as fried, breaded, smothered, rich, or creamy.    Tips for restaurant meals  When you eat away from home try these tips:  Try to schedule your dining-out meal at your normal meal time. Make a reservation if possible, so you don't have to wait to eat. If you can't make a reservation, try to arrive at the restaurant at a less-busy time to cut down your wait time. Eat a small fruit or starch snack at your regular mealtime if your restaurant meal is going to be later than usual.   Call ahead to see if the restaurant can meet your dietary needs if you've never been there before. Or you can go online to see the menu ahead of time.  Carry some crackers with you in case the restaurant needs you to wait until you can be served.  Ask how foods are prepared before you order.  Instead of fried, sautéed, or breaded foods, choose ones that are broiled, steamed, grilled, or baked.  Ask for sauces, gravies, and dressings on the side.  Only eat an amount that fits your meal plan. Remember: You can take home the leftovers.  Save dessert for special occasions. Then choose a small dessert or share one with a friend or family member.    Make healthy choices  Fast food  Garden salad with light dressing on the side  Baked potato with vegetables or herbs  Broiled, roasted, or grilled chicken sandwich  Sliced turkey or lean roast beef sandwich    Mexican  Chicken enchilada, without cheese or sour cream   Small burrito with whole beans and chicken  Whole beans (not refried) and rice  Chicken or fish fajitas    Steakhouse  Grilled or broiled lean cuts of beef  Baked potato with vegetables or herbs  Broiled or baked chicken. Dont eat the skin.  Steamed vegetables    Asian  Steamed dumplings or potstickers  Broiled, boiled, or steamed meats or fish  Sushi or sashimi  Steamed rice or boiled noodles. One serving is equal to 1/3 cup.      © 2639-3504 The 
[employer]. [address] [place] [address]. All rights reserved. This information is not intended as a substitute for professional medical care. Always follow your healthcare professional's instructions.    Snacks can be an important part of a balanced, healthy meal plan. They allow you to eat more frequently, feeling full and satisfied throughout the day. Also, they allow you to spread carbohydrates evenly, which may stabilize blood sugars.  Plus, snacks are enjoyable!     The amount of carbohydrate needed at snacks varies. Generally, about 15-30 grams of carbohydrate per snack is recommended.  Below you will find some tasty treats.       0-5 gm carb  Crystal Light  Vitamin Water Zero  Herbal tea, unsweetened  2 tsp peanut butter on celery  1./2 cup sugar-free jell-o  1 sugar-free popsicle  ¼ cup blueberries  8oz Blue Fannie unsweetened almond milk  5 baby carrots & celery sticks, cucumbers, bell peppers dipped in ¼ cup salsa, 2Tbsp light ranch dressing or 2Tbsp plain Greek yogurt  10 Goldfish crackers  ½ oz low-fat cheese or string cheese  1 closed handful of nuts, unsalted  1 Tbsp of sunflower seeds, unsalted  1 cup Smart Pop popcorn  1 whole grain brown rice cake        15 gm carb  1 small piece of fruit or ½ banana or 1/2 cup lite canned fruit  3 kimberley cracker squares  3 cups Smart Pop popcorn, top spray butter, Aquino lite salt or cinnamon and Truvia  5 Vanilla Wafers  ½ cup low fat, no added sugar ice cream or frozen yogurt (Blue bell, Blue Bunny, Weight Watchers, Skinny Cow)  ½ turkey, ham, or chicken sandwich  ½ c fruit with ½ c Cottage cheese  4-6 unsalted wheat crackers with 1 oz low fat cheese or 1 tbsp peanut butter   30-45 goldfish crackers (depending on flavor)   7-8 Jehovah's witness mini brown rice cakes (caramel, apple cinnamon, chocolate)   12 Jehovah's witness mini brown rice cakes (cheddar, bbq, ranch)   1/3 cup hummus dip with raw veg  1/2 whole wheat louann, 1Tbsp hummus  Mini Pizza (1/2 whole 
wheat English muffin, low-fat  cheese, tomato sauce)  100 calorie snack pack (Oreo, Chips Ahoy, Ritz Mix, Baked Cheetos)  4-6 oz. light or Greek Style yogurt (Chobani, Yoplait, Okios, [place])  ½ cup sugar-free pudding    6 in. wheat tortilla or louann oven toasted chips (topped with spray butter flavoring, cinnamon, Truvia OR spray butter, garlic powder, chili powder)   18 BBQ Popchips (available at [employer], [employer], Fresh Market)

## 2024-11-04 NOTE — BH INPATIENT PSYCHIATRY PROGRESS NOTE - NSBHMSELANG_PSY_A_CORE
Abscess of buttock  - sulfamethoxazole-trimethoprim (BACTRIM DS) 800-160 MG tablet; Take 1 tablet by mouth 2 times daily for 7 days.       Skin Abscess: Care Instructions  Overview     A skin abscess is a bacterial infection that forms a pocket of pus. A boil is a kind of skin abscess. The doctor may have cut an opening in the abscess so that the pus can drain out. You may have gauze in the cut so that the abscess will stay open and keep draining. You may need antibiotics. You will need to follow up with your doctor to make sure the infection has gone away.  The doctor has checked you carefully, but problems can develop later. If you notice any problems or new symptoms, get medical treatment right away.  Follow-up care is a key part of your treatment and safety. Be sure to make and go to all appointments, and call your doctor if you are having problems. It's also a good idea to know your test results and keep a list of the medicines you take.  How can you care for yourself at home?  Apply warm and dry compresses, a heating pad set on low, or a hot water bottle 3 or 4 times a day for pain. Keep a cloth between the heat source and your skin.  If your doctor prescribed antibiotics, take them as directed. Do not stop taking them just because you feel better. You need to take the full course of antibiotics.  Take pain medicines exactly as directed.  If the doctor gave you a prescription medicine for pain, take it as prescribed.  If you are not taking a prescription pain medicine, ask your doctor if you can take an over-the-counter medicine.  Keep your bandage clean and dry. Change the bandage whenever it gets wet or dirty, or at least one time a day.  If the abscess was packed with gauze:  Keep follow-up appointments to have the gauze changed or removed. If the doctor instructed you to remove the gauze, follow the instructions you were given for how to remove it.  After the gauze is removed, soak the area in warm water 
"for 15 to 20 minutes 2 times a day, until the wound closes.  When should you call for help?   Call your doctor now or seek immediate medical care if:    You have signs of worsening infection, such as:  Increased pain, swelling, warmth, or redness.  Red streaks leading from the infected skin.  Pus draining from the wound.  A fever.   Watch closely for changes in your health, and be sure to contact your doctor if:    You do not get better as expected.   Where can you learn more?  Go to [email or web address]/patiented  Enter D633 in the search box to learn more about \"Skin Abscess: Care Instructions.\"  Current as of: November 16, 2023  Content Version: 14.2 2024 [place].   Care instructions adapted under license by your healthcare professional. If you have questions about a medical condition or this instruction, always ask your healthcare professional. [employer] disclaims any warranty or liability for your use of this information.          Patient was advised to return to clinic for reevaluation (either UC or PCP) if symptoms do not improve in 7 days. Patient educated on red flag symptoms and asked to go directly to the ED if these symptoms present themselves.       MAGDIEL Damon [facility] URGENT [facility]ARE    Subjective   36 year old who presents to clinic today for the following health issues:    Urgent Care       HPI     Patient visits today for a boil that formed on his left back thigh near the buttock. Patient first noticed this a month ago. Patient states that it was better for a short time and then is has grown worse in the last coup[le of days. Patient noticed purulent discharge this morning. Patient states that the skin on his upper thigh has felt warm. Patient states that he has felt fatigue but no recent fever or chills. Patient has a history of boils in the past that have been self limited in nature.     Review of Systems   Review of Systems   See "
HPI    Objective    Temp: 97  F (36.1  C) Temp src: Tympanic BP: 112/76 Pulse: 95   Resp: 18 SpO2: 100 %       Physical Exam   Physical Exam  Constitutional:       General: He is not in acute distress.     Appearance: Normal appearance. He is normal weight. He is not ill-appearing, toxic-appearing or diaphoretic.   HENT:      Head: Normocephalic and atraumatic.   Cardiovascular:      Rate and Rhythm: Normal rate.      Pulses: Normal pulses.   Pulmonary:      Effort: Pulmonary effort is normal. No respiratory distress.   Skin:            Comments: Patient has a small 2 cm wide actively draining abscess in the area shown above with mild surrounding warmth and erythema.  The area is quite tender to touch.   Neurological:      General: No focal deficit present.      Mental Status: He is alert and oriented to person, place, and time. Mental status is at baseline.      Gait: Gait normal.   Psychiatric:         Mood and Affect: Mood normal.         Behavior: Behavior normal.         Thought Content: Thought content normal.         Judgment: Judgment normal.          No results found for this or any previous visit (from the past 24 hours).          
No abnormalities noted

## 2025-05-15 NOTE — PROGRESS NOTE ADULT - PROBLEM SELECTOR PLAN 3
Hgb 9s on admission    - Possibly iron deficient  - Received IV iron  - Continue with oral supplementation  - Outpatient follow up Quality 226: Preventive Care And Screening: Tobacco Use: Screening And Cessation Intervention: Patient screened for tobacco use and is an ex/non-smoker Detail Level: Detailed Quality 130: Documentation Of Current Medications In The Medical Record: Current Medications Documented Quality 431: Preventive Care And Screening: Unhealthy Alcohol Use - Screening: Patient not identified as an unhealthy alcohol user when screened for unhealthy alcohol use using a systematic screening method